# Patient Record
Sex: FEMALE | Race: WHITE | NOT HISPANIC OR LATINO | Employment: OTHER | URBAN - METROPOLITAN AREA
[De-identification: names, ages, dates, MRNs, and addresses within clinical notes are randomized per-mention and may not be internally consistent; named-entity substitution may affect disease eponyms.]

---

## 2017-01-23 ENCOUNTER — HOSPITAL ENCOUNTER (OUTPATIENT)
Dept: GENERAL RADIOLOGY | Facility: HOSPITAL | Age: 77
Discharge: HOME | End: 2017-01-23
Attending: FAMILY MEDICINE | Admitting: FAMILY MEDICINE

## 2017-01-23 ENCOUNTER — TRANSCRIBE ORDERS (OUTPATIENT)
Dept: ADMINISTRATIVE | Facility: HOSPITAL | Age: 77
End: 2017-01-23

## 2017-01-23 DIAGNOSIS — R09.02 HYPOXIA: ICD-10-CM

## 2017-01-23 DIAGNOSIS — R09.02 HYPOXEMIA: Primary | ICD-10-CM

## 2017-01-23 PROCEDURE — 71020 HC CHEST PA AND LATERAL: CPT

## 2017-09-27 ENCOUNTER — TRANSCRIBE ORDERS (OUTPATIENT)
Dept: ADMINISTRATIVE | Facility: HOSPITAL | Age: 77
End: 2017-09-27

## 2017-10-26 ENCOUNTER — TRANSCRIBE ORDERS (OUTPATIENT)
Dept: ADMINISTRATIVE | Facility: HOSPITAL | Age: 77
End: 2017-10-26

## 2017-10-26 DIAGNOSIS — Z12.31 VISIT FOR SCREENING MAMMOGRAM: Primary | ICD-10-CM

## 2017-11-15 ENCOUNTER — HOSPITAL ENCOUNTER (OUTPATIENT)
Dept: MAMMOGRAPHY | Facility: HOSPITAL | Age: 77
Discharge: HOME | End: 2017-11-15
Attending: FAMILY MEDICINE | Admitting: FAMILY MEDICINE

## 2017-11-15 DIAGNOSIS — Z12.31 VISIT FOR SCREENING MAMMOGRAM: ICD-10-CM

## 2017-11-15 PROCEDURE — G0202 SCR MAMMO BI INCL CAD: HCPCS

## 2017-11-15 PROCEDURE — 77063 BREAST TOMOSYNTHESIS BI: CPT

## 2017-11-16 PROCEDURE — 77063 BREAST TOMOSYNTHESIS BI: CPT | Performed by: RADIOLOGY

## 2017-11-16 PROCEDURE — G0202 SCR MAMMO BI INCL CAD: HCPCS | Performed by: RADIOLOGY

## 2018-03-23 ENCOUNTER — LAB REQUISITION (OUTPATIENT)
Dept: LAB | Facility: HOSPITAL | Age: 78
End: 2018-03-23

## 2018-03-23 DIAGNOSIS — R13.10 DYSPHAGIA: ICD-10-CM

## 2018-03-23 PROCEDURE — 88305 TISSUE EXAM BY PATHOLOGIST: CPT | Performed by: INTERNAL MEDICINE

## 2018-03-26 LAB
CYTO UR: NORMAL
LAB AP CASE REPORT: NORMAL
LAB AP CLINICAL INFORMATION: NORMAL
Lab: NORMAL
PATH REPORT.FINAL DX SPEC: NORMAL
PATH REPORT.GROSS SPEC: NORMAL

## 2018-12-03 ENCOUNTER — TRANSCRIBE ORDERS (OUTPATIENT)
Dept: ADMINISTRATIVE | Facility: HOSPITAL | Age: 78
End: 2018-12-03

## 2018-12-03 DIAGNOSIS — Z12.31 VISIT FOR SCREENING MAMMOGRAM: Primary | ICD-10-CM

## 2019-01-17 ENCOUNTER — HOSPITAL ENCOUNTER (OUTPATIENT)
Dept: MAMMOGRAPHY | Facility: HOSPITAL | Age: 79
Discharge: HOME | End: 2019-01-17
Attending: FAMILY MEDICINE | Admitting: FAMILY MEDICINE

## 2019-01-17 DIAGNOSIS — Z12.31 VISIT FOR SCREENING MAMMOGRAM: ICD-10-CM

## 2019-01-17 PROCEDURE — 77063 BREAST TOMOSYNTHESIS BI: CPT

## 2019-01-17 PROCEDURE — 77067 SCR MAMMO BI INCL CAD: CPT | Performed by: RADIOLOGY

## 2019-01-17 PROCEDURE — 77067 SCR MAMMO BI INCL CAD: CPT

## 2019-01-17 PROCEDURE — 77063 BREAST TOMOSYNTHESIS BI: CPT | Performed by: RADIOLOGY

## 2019-07-01 ENCOUNTER — OUTSIDE FACILITY SERVICE (OUTPATIENT)
Dept: GASTROENTEROLOGY | Facility: CLINIC | Age: 79
End: 2019-07-01

## 2019-07-01 PROCEDURE — 43239 EGD BIOPSY SINGLE/MULTIPLE: CPT | Performed by: INTERNAL MEDICINE

## 2019-07-17 ENCOUNTER — LAB REQUISITION (OUTPATIENT)
Dept: LAB | Facility: HOSPITAL | Age: 79
End: 2019-07-17

## 2019-07-17 DIAGNOSIS — Z00.00 ROUTINE GENERAL MEDICAL EXAMINATION AT A HEALTH CARE FACILITY: ICD-10-CM

## 2019-07-17 PROCEDURE — 36415 COLL VENOUS BLD VENIPUNCTURE: CPT

## 2020-02-20 ENCOUNTER — TRANSCRIBE ORDERS (OUTPATIENT)
Dept: ADMINISTRATIVE | Facility: HOSPITAL | Age: 80
End: 2020-02-20

## 2020-02-20 DIAGNOSIS — Z12.31 VISIT FOR SCREENING MAMMOGRAM: Primary | ICD-10-CM

## 2020-02-25 ENCOUNTER — HOSPITAL ENCOUNTER (OUTPATIENT)
Dept: MAMMOGRAPHY | Facility: HOSPITAL | Age: 80
Discharge: HOME | End: 2020-02-25
Admitting: FAMILY MEDICINE

## 2020-02-25 DIAGNOSIS — Z12.31 VISIT FOR SCREENING MAMMOGRAM: ICD-10-CM

## 2020-02-25 PROCEDURE — 77063 BREAST TOMOSYNTHESIS BI: CPT

## 2020-02-25 PROCEDURE — 77063 BREAST TOMOSYNTHESIS BI: CPT | Performed by: RADIOLOGY

## 2020-02-25 PROCEDURE — 77067 SCR MAMMO BI INCL CAD: CPT | Performed by: RADIOLOGY

## 2020-02-25 PROCEDURE — 77067 SCR MAMMO BI INCL CAD: CPT

## 2021-03-31 ENCOUNTER — TRANSCRIBE ORDERS (OUTPATIENT)
Dept: ADMINISTRATIVE | Facility: HOSPITAL | Age: 81
End: 2021-03-31

## 2021-03-31 DIAGNOSIS — Z12.31 VISIT FOR SCREENING MAMMOGRAM: Primary | ICD-10-CM

## 2021-05-19 ENCOUNTER — HOSPITAL ENCOUNTER (OUTPATIENT)
Dept: MAMMOGRAPHY | Facility: HOSPITAL | Age: 81
Discharge: HOME | End: 2021-05-19
Admitting: FAMILY MEDICINE

## 2021-05-19 DIAGNOSIS — Z12.31 VISIT FOR SCREENING MAMMOGRAM: ICD-10-CM

## 2021-05-19 PROCEDURE — 77063 BREAST TOMOSYNTHESIS BI: CPT

## 2021-05-19 PROCEDURE — 77067 SCR MAMMO BI INCL CAD: CPT

## 2021-05-24 PROCEDURE — 77063 BREAST TOMOSYNTHESIS BI: CPT | Performed by: RADIOLOGY

## 2021-05-24 PROCEDURE — 77067 SCR MAMMO BI INCL CAD: CPT | Performed by: RADIOLOGY

## 2021-11-29 ENCOUNTER — TELEPHONE (OUTPATIENT)
Dept: GASTROENTEROLOGY | Facility: CLINIC | Age: 81
End: 2021-11-29

## 2021-11-29 NOTE — TELEPHONE ENCOUNTER
PATIENT CALLED IN BECAUSE WE HAD SENT HER A LETTER INFORMING HER IT WAS TIME TO SCHEDULE A COLONOSCOPY, SHE INFORMED ME THAT SHE WAS SENT TO ANOTHER DOCTOR DUE TO HER HEMORRHOIDS AND THEY WENT AHEAD AND DID A COLONOSCOPY THEN.   SHE WILL NOT BE DUE UNTIL NEXT YEAR, AND SHE WILL CALL US BACK THEN.

## 2022-04-06 ENCOUNTER — TRANSCRIBE ORDERS (OUTPATIENT)
Dept: ADMINISTRATIVE | Facility: HOSPITAL | Age: 82
End: 2022-04-06

## 2022-04-06 DIAGNOSIS — Z12.31 VISIT FOR SCREENING MAMMOGRAM: Primary | ICD-10-CM

## 2022-07-05 ENCOUNTER — TRANSCRIBE ORDERS (OUTPATIENT)
Dept: ADMINISTRATIVE | Facility: HOSPITAL | Age: 82
End: 2022-07-05

## 2022-07-05 DIAGNOSIS — I73.9 PERIPHERAL VASCULAR DISEASE: Primary | ICD-10-CM

## 2022-07-16 ENCOUNTER — APPOINTMENT (OUTPATIENT)
Dept: MAMMOGRAPHY | Facility: HOSPITAL | Age: 82
End: 2022-07-16

## 2023-02-23 ENCOUNTER — HOSPITAL ENCOUNTER (EMERGENCY)
Facility: HOSPITAL | Age: 83
Discharge: HOME | End: 2023-02-23
Attending: EMERGENCY MEDICINE | Admitting: EMERGENCY MEDICINE
Payer: MEDICARE

## 2023-02-23 ENCOUNTER — APPOINTMENT (OUTPATIENT)
Dept: GENERAL RADIOLOGY | Facility: HOSPITAL | Age: 83
End: 2023-02-23
Payer: MEDICARE

## 2023-02-23 VITALS
HEART RATE: 73 BPM | BODY MASS INDEX: 17.49 KG/M2 | WEIGHT: 105 LBS | TEMPERATURE: 97.9 F | HEIGHT: 65 IN | SYSTOLIC BLOOD PRESSURE: 131 MMHG | RESPIRATION RATE: 24 BRPM | DIASTOLIC BLOOD PRESSURE: 76 MMHG | OXYGEN SATURATION: 100 %

## 2023-02-23 DIAGNOSIS — R07.89 LEFT-SIDED CHEST WALL PAIN: Primary | ICD-10-CM

## 2023-02-23 LAB
ALBUMIN SERPL-MCNC: 4.1 G/DL (ref 3.5–5.2)
ALBUMIN/GLOB SERPL: 1.5 G/DL
ALP SERPL-CCNC: 48 U/L (ref 39–117)
ALT SERPL W P-5'-P-CCNC: 16 U/L (ref 1–33)
ANION GAP SERPL CALCULATED.3IONS-SCNC: 10 MMOL/L (ref 5–15)
AST SERPL-CCNC: 25 U/L (ref 1–32)
BASOPHILS # BLD AUTO: 0.04 10*3/MM3 (ref 0–0.2)
BASOPHILS NFR BLD AUTO: 0.5 % (ref 0–1.5)
BILIRUB SERPL-MCNC: 0.5 MG/DL (ref 0–1.2)
BUN SERPL-MCNC: 14 MG/DL (ref 8–23)
BUN/CREAT SERPL: 12.2 (ref 7–25)
CALCIUM SPEC-SCNC: 9.4 MG/DL (ref 8.6–10.5)
CHLORIDE SERPL-SCNC: 108 MMOL/L (ref 98–107)
CO2 SERPL-SCNC: 22 MMOL/L (ref 22–29)
CREAT SERPL-MCNC: 1.15 MG/DL (ref 0.57–1)
DEPRECATED RDW RBC AUTO: 41.1 FL (ref 37–54)
EGFRCR SERPLBLD CKD-EPI 2021: 47.7 ML/MIN/1.73
EOSINOPHIL # BLD AUTO: 0.13 10*3/MM3 (ref 0–0.4)
EOSINOPHIL NFR BLD AUTO: 1.6 % (ref 0.3–6.2)
ERYTHROCYTE [DISTWIDTH] IN BLOOD BY AUTOMATED COUNT: 13.8 % (ref 12.3–15.4)
GLOBULIN UR ELPH-MCNC: 2.8 GM/DL
GLUCOSE SERPL-MCNC: 114 MG/DL (ref 65–99)
HCT VFR BLD AUTO: 44.1 % (ref 34–46.6)
HGB BLD-MCNC: 14.1 G/DL (ref 12–15.9)
HOLD SPECIMEN: NORMAL
IMM GRANULOCYTES # BLD AUTO: 0.02 10*3/MM3 (ref 0–0.05)
IMM GRANULOCYTES NFR BLD AUTO: 0.2 % (ref 0–0.5)
LYMPHOCYTES # BLD AUTO: 1.15 10*3/MM3 (ref 0.7–3.1)
LYMPHOCYTES NFR BLD AUTO: 13.9 % (ref 19.6–45.3)
MCH RBC QN AUTO: 26.3 PG (ref 26.6–33)
MCHC RBC AUTO-ENTMCNC: 32 G/DL (ref 31.5–35.7)
MCV RBC AUTO: 82.3 FL (ref 79–97)
MONOCYTES # BLD AUTO: 0.51 10*3/MM3 (ref 0.1–0.9)
MONOCYTES NFR BLD AUTO: 6.2 % (ref 5–12)
NEUTROPHILS NFR BLD AUTO: 6.41 10*3/MM3 (ref 1.7–7)
NEUTROPHILS NFR BLD AUTO: 77.6 % (ref 42.7–76)
NRBC BLD AUTO-RTO: 0 /100 WBC (ref 0–0.2)
NT-PROBNP SERPL-MCNC: 31.7 PG/ML (ref 0–1800)
PLATELET # BLD AUTO: 386 10*3/MM3 (ref 140–450)
PMV BLD AUTO: 9.2 FL (ref 6–12)
POTASSIUM SERPL-SCNC: 3.8 MMOL/L (ref 3.5–5.2)
PROT SERPL-MCNC: 6.9 G/DL (ref 6–8.5)
RBC # BLD AUTO: 5.36 10*6/MM3 (ref 3.77–5.28)
SODIUM SERPL-SCNC: 140 MMOL/L (ref 136–145)
TROPONIN T SERPL HS-MCNC: 7 NG/L
WBC NRBC COR # BLD: 8.26 10*3/MM3 (ref 3.4–10.8)
WHOLE BLOOD HOLD COAG: NORMAL
WHOLE BLOOD HOLD SPECIMEN: NORMAL

## 2023-02-23 PROCEDURE — 99283 EMERGENCY DEPT VISIT LOW MDM: CPT

## 2023-02-23 PROCEDURE — 93005 ELECTROCARDIOGRAM TRACING: CPT | Performed by: EMERGENCY MEDICINE

## 2023-02-23 PROCEDURE — 80053 COMPREHEN METABOLIC PANEL: CPT | Performed by: EMERGENCY MEDICINE

## 2023-02-23 PROCEDURE — 85025 COMPLETE CBC W/AUTO DIFF WBC: CPT | Performed by: EMERGENCY MEDICINE

## 2023-02-23 PROCEDURE — 84484 ASSAY OF TROPONIN QUANT: CPT | Performed by: EMERGENCY MEDICINE

## 2023-02-23 PROCEDURE — 83880 ASSAY OF NATRIURETIC PEPTIDE: CPT | Performed by: EMERGENCY MEDICINE

## 2023-02-23 PROCEDURE — 36415 COLL VENOUS BLD VENIPUNCTURE: CPT

## 2023-02-23 PROCEDURE — 71045 X-RAY EXAM CHEST 1 VIEW: CPT

## 2023-02-23 RX ORDER — SODIUM CHLORIDE 0.9 % (FLUSH) 0.9 %
10 SYRINGE (ML) INJECTION AS NEEDED
Status: DISCONTINUED | OUTPATIENT
Start: 2023-02-23 | End: 2023-02-23 | Stop reason: HOSPADM

## 2023-02-28 LAB
QT INTERVAL: 402 MS
QTC INTERVAL: 434 MS

## 2023-09-05 ENCOUNTER — HOSPITAL ENCOUNTER (OUTPATIENT)
Dept: ULTRASOUND IMAGING | Facility: HOSPITAL | Age: 83
Discharge: HOME | End: 2023-09-05
Payer: MEDICARE

## 2023-09-05 ENCOUNTER — HOSPITAL ENCOUNTER (OUTPATIENT)
Dept: MAMMOGRAPHY | Facility: HOSPITAL | Age: 83
Discharge: HOME | End: 2023-09-05
Payer: MEDICARE

## 2023-09-05 DIAGNOSIS — N64.4 BREAST PAIN, LEFT: ICD-10-CM

## 2023-09-05 PROCEDURE — G0279 TOMOSYNTHESIS, MAMMO: HCPCS

## 2023-09-05 PROCEDURE — 77066 DX MAMMO INCL CAD BI: CPT

## 2023-09-05 PROCEDURE — G0279 TOMOSYNTHESIS, MAMMO: HCPCS | Performed by: RADIOLOGY

## 2023-09-05 PROCEDURE — 76642 ULTRASOUND BREAST LIMITED: CPT

## 2023-09-05 PROCEDURE — 77066 DX MAMMO INCL CAD BI: CPT | Performed by: RADIOLOGY

## 2023-09-05 PROCEDURE — 76642 ULTRASOUND BREAST LIMITED: CPT | Performed by: RADIOLOGY

## 2024-03-11 ENCOUNTER — HOSPITAL ENCOUNTER (OUTPATIENT)
Dept: GENERAL RADIOLOGY | Facility: HOSPITAL | Age: 84
Discharge: HOME | End: 2024-03-11
Admitting: PHYSICIAN ASSISTANT
Payer: MEDICARE

## 2024-03-11 ENCOUNTER — TRANSCRIBE ORDERS (OUTPATIENT)
Dept: GENERAL RADIOLOGY | Facility: HOSPITAL | Age: 84
End: 2024-03-11
Payer: MEDICARE

## 2024-03-11 DIAGNOSIS — S49.91XA INJURY OF UPPER ARM, RIGHT, INITIAL ENCOUNTER: Primary | ICD-10-CM

## 2024-03-11 DIAGNOSIS — S49.91XA INJURY OF UPPER ARM, RIGHT, INITIAL ENCOUNTER: ICD-10-CM

## 2024-03-11 PROCEDURE — 73090 X-RAY EXAM OF FOREARM: CPT

## 2024-03-11 PROCEDURE — 73060 X-RAY EXAM OF HUMERUS: CPT

## 2024-08-12 ENCOUNTER — TRANSCRIBE ORDERS (OUTPATIENT)
Dept: MRI IMAGING | Facility: HOSPITAL | Age: 84
End: 2024-08-12
Payer: MEDICARE

## 2024-08-12 ENCOUNTER — HOSPITAL ENCOUNTER (OUTPATIENT)
Dept: GENERAL RADIOLOGY | Facility: HOSPITAL | Age: 84
Discharge: HOME | End: 2024-08-12
Admitting: FAMILY MEDICINE
Payer: MEDICARE

## 2024-08-12 DIAGNOSIS — M16.12 PRIMARY OSTEOARTHRITIS OF LEFT HIP: Primary | ICD-10-CM

## 2024-08-12 DIAGNOSIS — M16.12 PRIMARY OSTEOARTHRITIS OF LEFT HIP: ICD-10-CM

## 2024-08-12 PROCEDURE — 73502 X-RAY EXAM HIP UNI 2-3 VIEWS: CPT

## 2024-09-28 ENCOUNTER — APPOINTMENT (OUTPATIENT)
Dept: CT IMAGING | Facility: HOSPITAL | Age: 84
End: 2024-09-28
Payer: MEDICARE

## 2024-09-28 ENCOUNTER — APPOINTMENT (OUTPATIENT)
Dept: GENERAL RADIOLOGY | Facility: HOSPITAL | Age: 84
End: 2024-09-28
Payer: MEDICARE

## 2024-09-28 ENCOUNTER — HOSPITAL ENCOUNTER (EMERGENCY)
Facility: HOSPITAL | Age: 84
Discharge: HOME | End: 2024-09-28
Attending: EMERGENCY MEDICINE
Payer: MEDICARE

## 2024-09-28 VITALS
HEIGHT: 66 IN | RESPIRATION RATE: 16 BRPM | SYSTOLIC BLOOD PRESSURE: 124 MMHG | OXYGEN SATURATION: 95 % | BODY MASS INDEX: 16.39 KG/M2 | HEART RATE: 84 BPM | WEIGHT: 102 LBS | TEMPERATURE: 98.2 F | DIASTOLIC BLOOD PRESSURE: 74 MMHG

## 2024-09-28 DIAGNOSIS — S62.606A CLOSED NONDISPLACED FRACTURE OF PHALANX OF RIGHT LITTLE FINGER, UNSPECIFIED PHALANX, INITIAL ENCOUNTER: ICD-10-CM

## 2024-09-28 DIAGNOSIS — Z86.59 HISTORY OF DEMENTIA: ICD-10-CM

## 2024-09-28 DIAGNOSIS — N39.0 ACUTE UTI: Primary | ICD-10-CM

## 2024-09-28 LAB
ALBUMIN SERPL-MCNC: 4.1 G/DL (ref 3.5–5.2)
ALBUMIN/GLOB SERPL: 1.8 G/DL
ALP SERPL-CCNC: 79 U/L (ref 39–117)
ALT SERPL W P-5'-P-CCNC: 16 U/L (ref 1–33)
AMMONIA BLD-SCNC: 18 UMOL/L (ref 11–51)
AMPHET+METHAMPHET UR QL: NEGATIVE
AMPHETAMINES UR QL: NEGATIVE
ANION GAP SERPL CALCULATED.3IONS-SCNC: 16 MMOL/L (ref 5–15)
APAP SERPL-MCNC: <5 MCG/ML (ref 0–30)
AST SERPL-CCNC: 26 U/L (ref 1–32)
BACTERIA UR QL AUTO: ABNORMAL /HPF
BARBITURATES UR QL SCN: NEGATIVE
BASOPHILS # BLD AUTO: 0.05 10*3/MM3 (ref 0–0.2)
BASOPHILS NFR BLD AUTO: 0.6 % (ref 0–1.5)
BENZODIAZ UR QL SCN: NEGATIVE
BILIRUB SERPL-MCNC: 1 MG/DL (ref 0–1.2)
BILIRUB UR QL STRIP: NEGATIVE
BUN SERPL-MCNC: 21 MG/DL (ref 8–23)
BUN/CREAT SERPL: 18.4 (ref 7–25)
BUPRENORPHINE SERPL-MCNC: NEGATIVE NG/ML
CALCIUM SPEC-SCNC: 9.3 MG/DL (ref 8.6–10.5)
CANNABINOIDS SERPL QL: NEGATIVE
CHLORIDE SERPL-SCNC: 105 MMOL/L (ref 98–107)
CLARITY UR: ABNORMAL
CO2 SERPL-SCNC: 19 MMOL/L (ref 22–29)
COCAINE UR QL: NEGATIVE
COLOR UR: ABNORMAL
CREAT SERPL-MCNC: 1.14 MG/DL (ref 0.57–1)
D DIMER PPP FEU-MCNC: 0.55 MCGFEU/ML (ref 0–0.84)
DEPRECATED RDW RBC AUTO: 44.6 FL (ref 37–54)
EGFRCR SERPLBLD CKD-EPI 2021: 47.6 ML/MIN/1.73
EOSINOPHIL # BLD AUTO: 0.11 10*3/MM3 (ref 0–0.4)
EOSINOPHIL NFR BLD AUTO: 1.2 % (ref 0.3–6.2)
ERYTHROCYTE [DISTWIDTH] IN BLOOD BY AUTOMATED COUNT: 14.1 % (ref 12.3–15.4)
ETHANOL BLD-MCNC: <10 MG/DL (ref 0–10)
FENTANYL UR-MCNC: NEGATIVE NG/ML
GLOBULIN UR ELPH-MCNC: 2.3 GM/DL
GLUCOSE SERPL-MCNC: 87 MG/DL (ref 65–99)
GLUCOSE UR STRIP-MCNC: NEGATIVE MG/DL
HCT VFR BLD AUTO: 42.2 % (ref 34–46.6)
HGB BLD-MCNC: 13.6 G/DL (ref 12–15.9)
HGB UR QL STRIP.AUTO: ABNORMAL
HOLD SPECIMEN: NORMAL
HYALINE CASTS UR QL AUTO: ABNORMAL /LPF
IMM GRANULOCYTES # BLD AUTO: 0.04 10*3/MM3 (ref 0–0.05)
IMM GRANULOCYTES NFR BLD AUTO: 0.4 % (ref 0–0.5)
KETONES UR QL STRIP: ABNORMAL
LEUKOCYTE ESTERASE UR QL STRIP.AUTO: ABNORMAL
LYMPHOCYTES # BLD AUTO: 1.64 10*3/MM3 (ref 0.7–3.1)
LYMPHOCYTES NFR BLD AUTO: 18.1 % (ref 19.6–45.3)
MAGNESIUM SERPL-MCNC: 2.1 MG/DL (ref 1.6–2.4)
MCH RBC QN AUTO: 27.7 PG (ref 26.6–33)
MCHC RBC AUTO-ENTMCNC: 32.2 G/DL (ref 31.5–35.7)
MCV RBC AUTO: 85.9 FL (ref 79–97)
METHADONE UR QL SCN: NEGATIVE
MONOCYTES # BLD AUTO: 0.85 10*3/MM3 (ref 0.1–0.9)
MONOCYTES NFR BLD AUTO: 9.4 % (ref 5–12)
NEUTROPHILS NFR BLD AUTO: 6.37 10*3/MM3 (ref 1.7–7)
NEUTROPHILS NFR BLD AUTO: 70.3 % (ref 42.7–76)
NITRITE UR QL STRIP: NEGATIVE
NRBC BLD AUTO-RTO: 0 /100 WBC (ref 0–0.2)
NT-PROBNP SERPL-MCNC: 138.4 PG/ML (ref 0–1800)
OPIATES UR QL: NEGATIVE
OXYCODONE UR QL SCN: NEGATIVE
PCP UR QL SCN: NEGATIVE
PH UR STRIP.AUTO: <=5 [PH] (ref 5–8)
PLATELET # BLD AUTO: 294 10*3/MM3 (ref 140–450)
PMV BLD AUTO: 9.3 FL (ref 6–12)
POTASSIUM SERPL-SCNC: 3.7 MMOL/L (ref 3.5–5.2)
PROT SERPL-MCNC: 6.4 G/DL (ref 6–8.5)
PROT UR QL STRIP: ABNORMAL
QT INTERVAL: 404 MS
QTC INTERVAL: 465 MS
RBC # BLD AUTO: 4.91 10*6/MM3 (ref 3.77–5.28)
RBC # UR STRIP: ABNORMAL /HPF
REF LAB TEST METHOD: ABNORMAL
SALICYLATES SERPL-MCNC: <0.3 MG/DL
SODIUM SERPL-SCNC: 140 MMOL/L (ref 136–145)
SP GR UR STRIP: 1.03 (ref 1–1.03)
SQUAMOUS #/AREA URNS HPF: ABNORMAL /HPF
T4 FREE SERPL-MCNC: 1.13 NG/DL (ref 0.92–1.68)
TRICYCLICS UR QL SCN: NEGATIVE
TROPONIN T SERPL HS-MCNC: 23 NG/L
TSH SERPL DL<=0.05 MIU/L-ACNC: 2.12 UIU/ML (ref 0.27–4.2)
UROBILINOGEN UR QL STRIP: ABNORMAL
WBC # UR STRIP: ABNORMAL /HPF
WBC NRBC COR # BLD AUTO: 9.06 10*3/MM3 (ref 3.4–10.8)
WHOLE BLOOD HOLD COAG: NORMAL
WHOLE BLOOD HOLD SPECIMEN: NORMAL

## 2024-09-28 PROCEDURE — 99284 EMERGENCY DEPT VISIT MOD MDM: CPT

## 2024-09-28 PROCEDURE — 84484 ASSAY OF TROPONIN QUANT: CPT | Performed by: EMERGENCY MEDICINE

## 2024-09-28 PROCEDURE — 85379 FIBRIN DEGRADATION QUANT: CPT | Performed by: EMERGENCY MEDICINE

## 2024-09-28 PROCEDURE — 85025 COMPLETE CBC W/AUTO DIFF WBC: CPT | Performed by: EMERGENCY MEDICINE

## 2024-09-28 PROCEDURE — 70450 CT HEAD/BRAIN W/O DYE: CPT

## 2024-09-28 PROCEDURE — 83735 ASSAY OF MAGNESIUM: CPT | Performed by: EMERGENCY MEDICINE

## 2024-09-28 PROCEDURE — 87086 URINE CULTURE/COLONY COUNT: CPT | Performed by: EMERGENCY MEDICINE

## 2024-09-28 PROCEDURE — 82140 ASSAY OF AMMONIA: CPT | Performed by: EMERGENCY MEDICINE

## 2024-09-28 PROCEDURE — 82077 ASSAY SPEC XCP UR&BREATH IA: CPT | Performed by: EMERGENCY MEDICINE

## 2024-09-28 PROCEDURE — 81001 URINALYSIS AUTO W/SCOPE: CPT | Performed by: EMERGENCY MEDICINE

## 2024-09-28 PROCEDURE — 80179 DRUG ASSAY SALICYLATE: CPT | Performed by: EMERGENCY MEDICINE

## 2024-09-28 PROCEDURE — 87088 URINE BACTERIA CULTURE: CPT | Performed by: EMERGENCY MEDICINE

## 2024-09-28 PROCEDURE — 80307 DRUG TEST PRSMV CHEM ANLYZR: CPT | Performed by: EMERGENCY MEDICINE

## 2024-09-28 PROCEDURE — 87186 SC STD MICRODIL/AGAR DIL: CPT | Performed by: EMERGENCY MEDICINE

## 2024-09-28 PROCEDURE — 73130 X-RAY EXAM OF HAND: CPT

## 2024-09-28 PROCEDURE — 83880 ASSAY OF NATRIURETIC PEPTIDE: CPT | Performed by: EMERGENCY MEDICINE

## 2024-09-28 PROCEDURE — 93005 ELECTROCARDIOGRAM TRACING: CPT | Performed by: EMERGENCY MEDICINE

## 2024-09-28 PROCEDURE — 71045 X-RAY EXAM CHEST 1 VIEW: CPT

## 2024-09-28 PROCEDURE — 80143 DRUG ASSAY ACETAMINOPHEN: CPT | Performed by: EMERGENCY MEDICINE

## 2024-09-28 PROCEDURE — 80053 COMPREHEN METABOLIC PANEL: CPT | Performed by: EMERGENCY MEDICINE

## 2024-09-28 PROCEDURE — 84439 ASSAY OF FREE THYROXINE: CPT | Performed by: EMERGENCY MEDICINE

## 2024-09-28 PROCEDURE — 84443 ASSAY THYROID STIM HORMONE: CPT | Performed by: EMERGENCY MEDICINE

## 2024-09-28 RX ORDER — SODIUM CHLORIDE 0.9 % (FLUSH) 0.9 %
10 SYRINGE (ML) INJECTION AS NEEDED
Status: DISCONTINUED | OUTPATIENT
Start: 2024-09-28 | End: 2024-09-29 | Stop reason: HOSPADM

## 2024-09-28 RX ORDER — CEFUROXIME AXETIL 250 MG/1
500 TABLET ORAL ONCE
Status: COMPLETED | OUTPATIENT
Start: 2024-09-28 | End: 2024-09-28

## 2024-09-28 RX ORDER — CEFUROXIME AXETIL 250 MG/1
500 TABLET ORAL 2 TIMES DAILY
Qty: 28 TABLET | Refills: 0 | Status: SHIPPED | OUTPATIENT
Start: 2024-09-28 | End: 2024-09-28

## 2024-09-28 RX ORDER — CEFUROXIME AXETIL 250 MG/1
500 TABLET ORAL 2 TIMES DAILY
Qty: 28 TABLET | Refills: 0 | Status: SHIPPED | OUTPATIENT
Start: 2024-09-28 | End: 2024-10-05

## 2024-09-28 RX ADMIN — CEFUROXIME AXETIL 500 MG: 250 TABLET, FILM COATED ORAL at 22:04

## 2024-09-29 NOTE — ED PROVIDER NOTES
"Subjective   History of Present Illness  84-year-old female presents for evaluation of \"multiple complaints.\"  Of note, the patient is accompanied by her daughter who aids and corroborates in her history.  The patient lives alone.  She has a history of early dementia.  The patient was reportedly at her baseline according to her daughter yesterday evening.  This morning, she reportedly was walking outside in the rain confused.  She is continue to be confused when compared to her baseline throughout the day today so her daughter brought her here to the ED to be evaluated.  The patient is complaining of shortness of breath with exertion.  She reports having a fall and is complaining of pain to her right pinky finger.  She denies any cough or congestion.  She denies any known sick contacts or any known exposures to anyone with COVID-19.  The patient's daughter tells me that she has had similar episodes of confusion before in the past over the past few years.  No known fevers.  No neck pain or stiffness.      Review of Systems   Respiratory:  Positive for shortness of breath.    Psychiatric/Behavioral:  Positive for confusion.    All other systems reviewed and are negative.      No past medical history on file.    No Known Allergies    Past Surgical History:   Procedure Laterality Date    BREAST CYST ASPIRATION Left 2002    HYSTERECTOMY  1990       Family History   Problem Relation Age of Onset    Ovarian cancer Neg Hx     Breast cancer Neg Hx        Social History     Socioeconomic History    Marital status:            Objective   Physical Exam  Vitals and nursing note reviewed.   Constitutional:       General: She is not in acute distress.     Appearance: She is well-developed. She is not diaphoretic.      Comments: Nontoxic-appearing female   HENT:      Head: Normocephalic and atraumatic.   Eyes:      Pupils: Pupils are equal, round, and reactive to light.   Neck:      Comments: No meningeal signs or nuchal " "rigidity  Cardiovascular:      Rate and Rhythm: Normal rate and regular rhythm.      Heart sounds: Normal heart sounds. No murmur heard.     No friction rub. No gallop.   Pulmonary:      Effort: Pulmonary effort is normal. No respiratory distress.      Breath sounds: Normal breath sounds. No wheezing or rales.   Abdominal:      General: Bowel sounds are normal. There is no distension.      Palpations: Abdomen is soft. There is no mass.      Tenderness: There is no abdominal tenderness. There is no guarding or rebound.      Comments: No focal abdominal tenderness, no peritoneal signs, no pain out of proportion to exam   Musculoskeletal:         General: Normal range of motion.      Cervical back: Neck supple.   Skin:     General: Skin is warm and dry.      Findings: No erythema or rash.   Neurological:      Mental Status: She is alert and oriented to person, place, and time.      Comments: Awake, alert, and oriented x3, clear and fluent speech, no ataxia or dysmetria, normal gait, neurovascularly intact distally in all fours with bounding distal pulses and normal sensation, 5 out of 5 strength in all fours, no cranial nerve deficits noted with cranial nerves II through XII grossly intact   Psychiatric:         Mood and Affect: Mood normal.         Thought Content: Thought content normal.         Judgment: Judgment normal.         Procedures           ED Course  ED Course as of 09/29/24 0058   Sat Sep 28, 2024   2005 84-year-old female presents for evaluation of \"multiple complaints.\"  Of note, she is accompanied by her daughter who corroborates her history.  The patient lives alone.  She has a history of early dementia.  Patient was reportedly at her baseline according to her daughter yesterday evening.  This morning, she reportedly was walking out in the rain confused.  She has continued to be confused when compared to her baseline throughout the day today.  She is complaining of shortness of breath with exertion.  " Given her symptoms, her daughter brought her here to the emergency department to be evaluated this evening.  On arrival, the patient is nontoxic-appearing.  She is answering questions appropriately.  Vital signs are reassuring.  Differential diagnosis is quite broad.  We will obtain labs and imaging, and we will reassess following initial interventions. [DD]   2018 I personally and independently viewed the patient's x-ray images myself, and I am in agreement with the radiologist's reading for final interpretation, particularly there is no pneumonia noted. [DD]   2035 The patient is complaining of pain to her right fifth digit from a fall earlier today.  She has mild circumferential soft tissue swelling noted to her right pinky finger. [DD]   2036 I personally and independently viewed the patient's x-ray images myself, and I am in agreement with the radiologist's reading for final interpretation, particularly regarding fracture to right pinky thinker.  Aluminum finger splint placed. [DD]   2104 Labs are bland/unrevealing. [DD]   2104 I personally and independently reviewed the patient's CT images and findings, and I am in agreement with the radiologist regarding CT interpretation--particularly there is no emergent/surgical intracranial process present. [DD]   2138 Urinalysis is suggestive of infection.  Ceftin given.  Urine culture obtained. [DD]   2139 High-sensitivity troponin is mildly elevated at 23 but clinical presentation is clearly not consistent with ACS.  Low risk Well's and D dimer is negative. [DD]      ED Course User Index  [DD] Carlo Rose MD                                 Recent Results (from the past 24 hour(s))   ECG 12 Lead ED Triage Standing Order; Weak / Dizzy / AMS    Collection Time: 09/28/24  7:40 PM   Result Value Ref Range    QT Interval 404 ms    QTC Interval 465 ms   Comprehensive Metabolic Panel    Collection Time: 09/28/24  8:23 PM    Specimen: Blood   Result Value Ref Range     Glucose 87 65 - 99 mg/dL    BUN 21 8 - 23 mg/dL    Creatinine 1.14 (H) 0.57 - 1.00 mg/dL    Sodium 140 136 - 145 mmol/L    Potassium 3.7 3.5 - 5.2 mmol/L    Chloride 105 98 - 107 mmol/L    CO2 19.0 (L) 22.0 - 29.0 mmol/L    Calcium 9.3 8.6 - 10.5 mg/dL    Total Protein 6.4 6.0 - 8.5 g/dL    Albumin 4.1 3.5 - 5.2 g/dL    ALT (SGPT) 16 1 - 33 U/L    AST (SGOT) 26 1 - 32 U/L    Alkaline Phosphatase 79 39 - 117 U/L    Total Bilirubin 1.0 0.0 - 1.2 mg/dL    Globulin 2.3 gm/dL    A/G Ratio 1.8 g/dL    BUN/Creatinine Ratio 18.4 7.0 - 25.0    Anion Gap 16.0 (H) 5.0 - 15.0 mmol/L    eGFR 47.6 (L) >60.0 mL/min/1.73   Single High Sensitivity Troponin T    Collection Time: 09/28/24  8:23 PM    Specimen: Blood   Result Value Ref Range    HS Troponin T 23 (H) <14 ng/L   Magnesium    Collection Time: 09/28/24  8:23 PM    Specimen: Blood   Result Value Ref Range    Magnesium 2.1 1.6 - 2.4 mg/dL   Green Top (Gel)    Collection Time: 09/28/24  8:23 PM   Result Value Ref Range    Extra Tube Hold for add-ons.    Lavender Top    Collection Time: 09/28/24  8:23 PM   Result Value Ref Range    Extra Tube hold for add-on    Gold Top - SST    Collection Time: 09/28/24  8:23 PM   Result Value Ref Range    Extra Tube Hold for add-ons.    Gray Top    Collection Time: 09/28/24  8:23 PM   Result Value Ref Range    Extra Tube Hold for add-ons.    Light Blue Top    Collection Time: 09/28/24  8:23 PM   Result Value Ref Range    Extra Tube Hold for add-ons.    CBC Auto Differential    Collection Time: 09/28/24  8:23 PM    Specimen: Blood   Result Value Ref Range    WBC 9.06 3.40 - 10.80 10*3/mm3    RBC 4.91 3.77 - 5.28 10*6/mm3    Hemoglobin 13.6 12.0 - 15.9 g/dL    Hematocrit 42.2 34.0 - 46.6 %    MCV 85.9 79.0 - 97.0 fL    MCH 27.7 26.6 - 33.0 pg    MCHC 32.2 31.5 - 35.7 g/dL    RDW 14.1 12.3 - 15.4 %    RDW-SD 44.6 37.0 - 54.0 fl    MPV 9.3 6.0 - 12.0 fL    Platelets 294 140 - 450 10*3/mm3    Neutrophil % 70.3 42.7 - 76.0 %    Lymphocyte % 18.1  (L) 19.6 - 45.3 %    Monocyte % 9.4 5.0 - 12.0 %    Eosinophil % 1.2 0.3 - 6.2 %    Basophil % 0.6 0.0 - 1.5 %    Immature Grans % 0.4 0.0 - 0.5 %    Neutrophils, Absolute 6.37 1.70 - 7.00 10*3/mm3    Lymphocytes, Absolute 1.64 0.70 - 3.10 10*3/mm3    Monocytes, Absolute 0.85 0.10 - 0.90 10*3/mm3    Eosinophils, Absolute 0.11 0.00 - 0.40 10*3/mm3    Basophils, Absolute 0.05 0.00 - 0.20 10*3/mm3    Immature Grans, Absolute 0.04 0.00 - 0.05 10*3/mm3    nRBC 0.0 0.0 - 0.2 /100 WBC   T4, Free    Collection Time: 09/28/24  8:23 PM    Specimen: Blood   Result Value Ref Range    Free T4 1.13 0.92 - 1.68 ng/dL   TSH    Collection Time: 09/28/24  8:23 PM    Specimen: Blood   Result Value Ref Range    TSH 2.120 0.270 - 4.200 uIU/mL   Ammonia    Collection Time: 09/28/24  8:23 PM    Specimen: Blood   Result Value Ref Range    Ammonia 18 11 - 51 umol/L   Acetaminophen Level    Collection Time: 09/28/24  8:23 PM    Specimen: Blood   Result Value Ref Range    Acetaminophen <5.0 0.0 - 30.0 mcg/mL   Ethanol    Collection Time: 09/28/24  8:23 PM    Specimen: Blood   Result Value Ref Range    Ethanol <10 0 - 10 mg/dL   Salicylate Level    Collection Time: 09/28/24  8:23 PM    Specimen: Blood   Result Value Ref Range    Salicylate <0.3 <=30.0 mg/dL   D-dimer, Quantitative    Collection Time: 09/28/24  8:23 PM    Specimen: Blood   Result Value Ref Range    D-Dimer, Quantitative 0.55 0.00 - 0.84 MCGFEU/mL   BNP    Collection Time: 09/28/24  8:23 PM    Specimen: Blood   Result Value Ref Range    proBNP 138.4 0.0 - 1,800.0 pg/mL   Urinalysis With Microscopic If Indicated (No Culture) - Urine, Clean Catch    Collection Time: 09/28/24  8:26 PM    Specimen: Urine, Clean Catch   Result Value Ref Range    Color, UA Dark Yellow (A) Yellow, Straw    Appearance, UA Turbid (A) Clear    pH, UA <=5.0 5.0 - 8.0    Specific Gravity, UA 1.029 1.001 - 1.030    Glucose, UA Negative Negative    Ketones, UA 15 mg/dL (1+) (A) Negative    Bilirubin, UA  Negative Negative    Blood, UA Trace (A) Negative    Protein, UA 30 mg/dL (1+) (A) Negative    Leuk Esterase, UA Moderate (2+) (A) Negative    Nitrite, UA Negative Negative    Urobilinogen, UA 1.0 E.U./dL 0.2 - 1.0 E.U./dL   Urine Drug Screen - Urine, Clean Catch    Collection Time: 09/28/24  8:26 PM    Specimen: Urine, Clean Catch   Result Value Ref Range    THC, Screen, Urine Negative Negative    Phencyclidine (PCP), Urine Negative Negative    Cocaine Screen, Urine Negative Negative    Methamphetamine, Ur Negative Negative    Opiate Screen Negative Negative    Amphetamine Screen, Urine Negative Negative    Benzodiazepine Screen, Urine Negative Negative    Tricyclic Antidepressants Screen Negative Negative    Methadone Screen, Urine Negative Negative    Barbiturates Screen, Urine Negative Negative    Oxycodone Screen, Urine Negative Negative    Buprenorphine, Screen, Urine Negative Negative   Fentanyl, Urine - Urine, Clean Catch    Collection Time: 09/28/24  8:26 PM    Specimen: Urine, Clean Catch   Result Value Ref Range    Fentanyl, Urine Negative Negative   Urinalysis, Microscopic Only - Urine, Clean Catch    Collection Time: 09/28/24  8:26 PM    Specimen: Urine, Clean Catch   Result Value Ref Range    RBC, UA 3-5 (A) None Seen, 0-2 /HPF    WBC, UA 6-10 (A) None Seen, 0-2 /HPF    Bacteria, UA 4+ (A) None Seen, Trace /HPF    Squamous Epithelial Cells, UA 3-6 (A) None Seen, 0-2 /HPF    Hyaline Casts, UA 7-12 0 - 6 /LPF    Methodology Manual Light Microscopy      Note: In addition to lab results from this visit, the labs listed above may include labs taken at another facility or during a different encounter within the last 24 hours. Please correlate lab times with ED admission and discharge times for further clarification of the services performed during this visit.    CT Head Without Contrast   Final Result   Impression:   1.No acute intracranial abnormality.   2.Mild chronic small vessel ischemic change.   3.Mild  generalized parenchymal volume loss congruent with age.            Electronically Signed: Jose Manuel Cutler MD     9/28/2024 8:45 PM EDT     Workstation ID: [id number]      XR Chest 1 View   Final Result   Impression:   1.No acute process identified.         Electronically Signed: Hayden Iniguez MD     9/28/2024 7:55 PM EDT     Workstation ID: [id number]      XR Hand 3+ View Right   Final Result   Impression:   1.Questionable nondisplaced avulsion fracture at the dorsal base of the fifth digit distal phalanx. Artifact from nutrient channel in the bone is also possible. Correlate for point tenderness at the dorsal DIP joint.   2.Osteopenia and osteoarthritis.            Electronically Signed: Jose Manuel Cutler MD     9/28/2024 8:20 PM EDT     Workstation ID: [id number]        Vitals:    09/28/24 1930 09/28/24 2015 09/28/24 2100 09/28/24 2130   BP: 131/55 127/69 114/75 124/74   BP Location:       Patient Position:       Pulse: 82 80 86 84   Resp:       Temp:       TempSrc:       SpO2: 97% 97% 96% 95%   Weight:       Height:         Medications   cefuroxime (CEFTIN) tablet 500 mg (500 mg Oral Given 9/28/24 2204)     ECG/EMG Results (last 24 hours)       ** No results found for the last 24 hours. **          ECG 12 Lead ED Triage Standing Order; Weak / Dizzy / AMS   Final Result   Test Reason : ED Triage Standing Order~   Blood Pressure :   */*   mmHG   Vent. Rate :  80 BPM     Atrial Rate :  80 BPM      P-R Int : 130 ms          QRS Dur :  70 ms       QT Int : 404 ms       P-R-T Axes :  88  10  85 degrees      QTc Int : 465 ms      Normal sinus rhythm   Nonspecific ST and T wave abnormality   Abnormal ECG   When compared with ECG of 23-FEB-2023 15:54,   Nonspecific T wave abnormality, improved in Inferior leads   Confirmed by MD Rose Michael ([id number]) on 9/28/2024 10:28:26 PM      Referred By: gemma           Confirmed By: Brando Rose MD                      Medical Decision Making  Problems Addressed:  Acute UTI: complicated acute  illness or injury  Closed nondisplaced fracture of phalanx of right little finger, unspecified phalanx, initial encounter: complicated acute illness or injury  History of dementia: complicated acute illness or injury    Amount and/or Complexity of Data Reviewed  Labs: ordered.  Radiology: ordered.  ECG/medicine tests: ordered.    Risk  Prescription drug management.        Final diagnoses:   Acute UTI   History of dementia   Closed nondisplaced fracture of phalanx of right little finger, unspecified phalanx, initial encounter       ED Disposition  ED Disposition       ED Disposition   Discharge    Condition   Stable    Comment   --               Frandy Kuhn MD  [address]  [address]  [place] [id number]  [phone]    In 1 week      Effie Ruff MD  [address] [id number] [phone]    In 1 week           Medication List        New Prescriptions      cefuroxime 250 MG tablet  Commonly known as: CEFTIN  Take 2 tablets by mouth 2 (Two) Times a Day for 7 days.               Where to Get Your Medications        These medications were sent to [facility] [address] [place], [place] [address], [place] [address] [phone] [place] [phone] [address] [address]      Phone: [phone]   cefuroxime 250 MG tablet            Carlo Rose MD  09/29/24 0100

## 2024-10-01 LAB — BACTERIA SPEC AEROBE CULT: ABNORMAL

## 2024-10-06 ENCOUNTER — HOSPITAL ENCOUNTER (INPATIENT)
Facility: HOSPITAL | Age: 84
LOS: 6 days | Discharge: ANOTHER HOSPITAL | End: 2024-10-14
Attending: EMERGENCY MEDICINE | Admitting: INTERNAL MEDICINE
Payer: MEDICARE

## 2024-10-06 ENCOUNTER — APPOINTMENT (OUTPATIENT)
Dept: CT IMAGING | Facility: HOSPITAL | Age: 84
End: 2024-10-06
Payer: MEDICARE

## 2024-10-06 ENCOUNTER — APPOINTMENT (OUTPATIENT)
Dept: GENERAL RADIOLOGY | Facility: HOSPITAL | Age: 84
End: 2024-10-06
Payer: MEDICARE

## 2024-10-06 DIAGNOSIS — N28.9 MILD RENAL INSUFFICIENCY: ICD-10-CM

## 2024-10-06 DIAGNOSIS — R41.82 ALTERED MENTAL STATUS, UNSPECIFIED ALTERED MENTAL STATUS TYPE: Primary | ICD-10-CM

## 2024-10-06 DIAGNOSIS — R13.10 DYSPHAGIA, UNSPECIFIED TYPE: ICD-10-CM

## 2024-10-06 DIAGNOSIS — E86.0 DEHYDRATION: ICD-10-CM

## 2024-10-06 DIAGNOSIS — Z87.440 RECENT URINARY TRACT INFECTION: ICD-10-CM

## 2024-10-06 LAB
ALBUMIN SERPL-MCNC: 4.6 G/DL (ref 3.5–5.2)
ALBUMIN/GLOB SERPL: 1.7 G/DL
ALP SERPL-CCNC: 73 U/L (ref 39–117)
ALT SERPL W P-5'-P-CCNC: 24 U/L (ref 1–33)
ANION GAP SERPL CALCULATED.3IONS-SCNC: 17 MMOL/L (ref 5–15)
AST SERPL-CCNC: 41 U/L (ref 1–32)
BACTERIA UR QL AUTO: ABNORMAL /HPF
BASOPHILS # BLD AUTO: 0.1 10*3/MM3 (ref 0–0.2)
BASOPHILS NFR BLD AUTO: 1.1 % (ref 0–1.5)
BILIRUB SERPL-MCNC: 0.9 MG/DL (ref 0–1.2)
BILIRUB UR QL STRIP: NEGATIVE
BUN SERPL-MCNC: 26 MG/DL (ref 8–23)
BUN/CREAT SERPL: 15.2 (ref 7–25)
CALCIUM SPEC-SCNC: 9.9 MG/DL (ref 8.6–10.5)
CHLORIDE SERPL-SCNC: 102 MMOL/L (ref 98–107)
CLARITY UR: CLEAR
CO2 SERPL-SCNC: 19 MMOL/L (ref 22–29)
COLOR UR: YELLOW
CREAT SERPL-MCNC: 1.71 MG/DL (ref 0.57–1)
D-LACTATE SERPL-SCNC: 1.1 MMOL/L (ref 0.5–2)
D-LACTATE SERPL-SCNC: 3.5 MMOL/L (ref 0.5–2)
DEPRECATED RDW RBC AUTO: 44.8 FL (ref 37–54)
EGFRCR SERPLBLD CKD-EPI 2021: 29.2 ML/MIN/1.73
EOSINOPHIL # BLD AUTO: 0.17 10*3/MM3 (ref 0–0.4)
EOSINOPHIL NFR BLD AUTO: 1.8 % (ref 0.3–6.2)
ERYTHROCYTE [DISTWIDTH] IN BLOOD BY AUTOMATED COUNT: 14.2 % (ref 12.3–15.4)
GLOBULIN UR ELPH-MCNC: 2.7 GM/DL
GLUCOSE SERPL-MCNC: 102 MG/DL (ref 65–99)
GLUCOSE UR STRIP-MCNC: NEGATIVE MG/DL
HCT VFR BLD AUTO: 45 % (ref 34–46.6)
HGB BLD-MCNC: 14.4 G/DL (ref 12–15.9)
HGB UR QL STRIP.AUTO: NEGATIVE
HYALINE CASTS UR QL AUTO: ABNORMAL /LPF
IMM GRANULOCYTES # BLD AUTO: 0.03 10*3/MM3 (ref 0–0.05)
IMM GRANULOCYTES NFR BLD AUTO: 0.3 % (ref 0–0.5)
KETONES UR QL STRIP: ABNORMAL
LEUKOCYTE ESTERASE UR QL STRIP.AUTO: NEGATIVE
LYMPHOCYTES # BLD AUTO: 0.93 10*3/MM3 (ref 0.7–3.1)
LYMPHOCYTES NFR BLD AUTO: 10.1 % (ref 19.6–45.3)
MCH RBC QN AUTO: 27.6 PG (ref 26.6–33)
MCHC RBC AUTO-ENTMCNC: 32 G/DL (ref 31.5–35.7)
MCV RBC AUTO: 86.4 FL (ref 79–97)
MONOCYTES # BLD AUTO: 0.66 10*3/MM3 (ref 0.1–0.9)
MONOCYTES NFR BLD AUTO: 7.1 % (ref 5–12)
NEUTROPHILS NFR BLD AUTO: 7.35 10*3/MM3 (ref 1.7–7)
NEUTROPHILS NFR BLD AUTO: 79.6 % (ref 42.7–76)
NITRITE UR QL STRIP: NEGATIVE
NRBC BLD AUTO-RTO: 0 /100 WBC (ref 0–0.2)
NT-PROBNP SERPL-MCNC: 195.9 PG/ML (ref 0–1800)
PH UR STRIP.AUTO: 5.5 [PH] (ref 5–8)
PLATELET # BLD AUTO: 330 10*3/MM3 (ref 140–450)
PMV BLD AUTO: 9.5 FL (ref 6–12)
POTASSIUM SERPL-SCNC: 4.1 MMOL/L (ref 3.5–5.2)
PROT SERPL-MCNC: 7.3 G/DL (ref 6–8.5)
PROT UR QL STRIP: ABNORMAL
QT INTERVAL: 382 MS
QTC INTERVAL: 467 MS
RBC # BLD AUTO: 5.21 10*6/MM3 (ref 3.77–5.28)
RBC # UR STRIP: ABNORMAL /HPF
REF LAB TEST METHOD: ABNORMAL
SODIUM SERPL-SCNC: 138 MMOL/L (ref 136–145)
SP GR UR STRIP: 1.03 (ref 1–1.03)
SQUAMOUS #/AREA URNS HPF: ABNORMAL /HPF
TROPONIN T SERPL HS-MCNC: 19 NG/L
UROBILINOGEN UR QL STRIP: ABNORMAL
WBC # UR STRIP: ABNORMAL /HPF
WBC NRBC COR # BLD AUTO: 9.24 10*3/MM3 (ref 3.4–10.8)

## 2024-10-06 PROCEDURE — G0378 HOSPITAL OBSERVATION PER HR: HCPCS

## 2024-10-06 PROCEDURE — 71045 X-RAY EXAM CHEST 1 VIEW: CPT

## 2024-10-06 PROCEDURE — 82077 ASSAY SPEC XCP UR&BREATH IA: CPT | Performed by: STUDENT IN AN ORGANIZED HEALTH CARE EDUCATION/TRAINING PROGRAM

## 2024-10-06 PROCEDURE — P9612 CATHETERIZE FOR URINE SPEC: HCPCS

## 2024-10-06 PROCEDURE — 99285 EMERGENCY DEPT VISIT HI MDM: CPT

## 2024-10-06 PROCEDURE — 36415 COLL VENOUS BLD VENIPUNCTURE: CPT

## 2024-10-06 PROCEDURE — 83605 ASSAY OF LACTIC ACID: CPT | Performed by: EMERGENCY MEDICINE

## 2024-10-06 PROCEDURE — 93005 ELECTROCARDIOGRAM TRACING: CPT | Performed by: EMERGENCY MEDICINE

## 2024-10-06 PROCEDURE — 70450 CT HEAD/BRAIN W/O DYE: CPT

## 2024-10-06 PROCEDURE — 84484 ASSAY OF TROPONIN QUANT: CPT | Performed by: EMERGENCY MEDICINE

## 2024-10-06 PROCEDURE — 80053 COMPREHEN METABOLIC PANEL: CPT | Performed by: EMERGENCY MEDICINE

## 2024-10-06 PROCEDURE — 25010000002 CEFTRIAXONE PER 250 MG: Performed by: EMERGENCY MEDICINE

## 2024-10-06 PROCEDURE — 87040 BLOOD CULTURE FOR BACTERIA: CPT | Performed by: EMERGENCY MEDICINE

## 2024-10-06 PROCEDURE — 83880 ASSAY OF NATRIURETIC PEPTIDE: CPT | Performed by: EMERGENCY MEDICINE

## 2024-10-06 PROCEDURE — 85025 COMPLETE CBC W/AUTO DIFF WBC: CPT | Performed by: EMERGENCY MEDICINE

## 2024-10-06 PROCEDURE — 81001 URINALYSIS AUTO W/SCOPE: CPT | Performed by: EMERGENCY MEDICINE

## 2024-10-06 PROCEDURE — 84443 ASSAY THYROID STIM HORMONE: CPT | Performed by: STUDENT IN AN ORGANIZED HEALTH CARE EDUCATION/TRAINING PROGRAM

## 2024-10-06 RX ORDER — SODIUM CHLORIDE 0.9 % (FLUSH) 0.9 %
10 SYRINGE (ML) INJECTION AS NEEDED
Status: DISCONTINUED | OUTPATIENT
Start: 2024-10-06 | End: 2024-10-14 | Stop reason: HOSPADM

## 2024-10-06 RX ADMIN — SODIUM CHLORIDE 1000 MG: 900 INJECTION INTRAVENOUS at 20:39

## 2024-10-06 NOTE — ED PROVIDER NOTES
[facility]    EMERGENCY DEPARTMENT ENCOUNTER      Pt Name: Marycruz Funez  MRN: [id number]  YOB: 1940  Date of evaluation: 10/6/2024  Provider: Alex Lee DO    CHIEF COMPLAINT       Chief Complaint   Patient presents with    Altered Mental Status       HPI  Stated Reason for Visit: PT NORMALLY LIVES ALONE, BUT HAS BEEN UNABLE TO BE ALONE FOR THE LAST WEEK D/T CONFUSION AND HALLUCINATIONS. PT WAS HERE 1 WEEK AGO, DX WITH UTI, ABX SWITCHED TO BACTRIM BY PCP MID WEEK. PT BROUGHT TO ED BY SON AND DAUGHTER BECAUSE CONFUSION, BEHAVIOR, AND HALLUCINATIONS ARE GETTING WORSE. History Obtained From: family       HISTORY OF PRESENT ILLNESS  (Location/Symptom, Timing/Onset, Context/Setting, Quality, Duration, Modifying Factors, Severity.)   Marycruz Funez is a 84 y.o. female who presents to the emergency department with her children at her bedside secondary to concern for increasing weakness, confusions hallucinations that she was recently seen and diagnosed with a possible urinary tract infection and started on antibiotic, went to her PCP and has been on Bactrim for the last few days.  The family notes she has been progressively weaker not eating and drinking, hallucinating, as I do know she has a history of underlying dementia but this has been much more significant progressive over the last week or so.  They are at a point now where they cannot care for the patient can even get her up to get her to appointments or even to the emergency department this evening.  The patient herself is very limited in her responses, stating she just does not want to do anything does not want to eat or drink.      Nursing notes were reviewed.      PAST MEDICAL HISTORY   No past medical history on file.      SURGICAL HISTORY       Past Surgical History:   Procedure Laterality Date    BREAST CYST ASPIRATION Left 2002    HYSTERECTOMY  1990         CURRENT MEDICATIONS       Current Facility-Administered Medications:      [COMPLETED] Insert Peripheral IV, , , Once **AND** sodium chloride 0.9 % flush 10 mL, 10 mL, Intravenous, PRN, Alex Lee, DO  No current outpatient medications on file.    ALLERGIES     Patient has no known allergies.    FAMILY HISTORY       Family History   Problem Relation Age of Onset    Ovarian cancer Neg Hx     Breast cancer Neg Hx           SOCIAL HISTORY       Social History     Socioeconomic History    Marital status:          PHYSICAL EXAM    (up to 7 for level 4, 8 or more for level 5)     Vitals:    10/06/24 1929 10/06/24 1959 10/06/24 2029 10/06/24 2200   BP: 113/88 125/77 138/80 108/80   Pulse: 91 85 91 87   Resp:       Temp:       TempSrc:       SpO2: 90% 91% 93% 93%   Weight:       Height:           Physical Exam  General : Patient is awake, but very elderly, frail  HEENT: Pupils are equally round, EOMI, conjunctivae clear, oral mucosa is dry  Cardiac: Heart tachycardic rate with regular rhythm, no murmurs, rubs, or gallops  Lungs: Lungs with decreased breath sounds bilaterally, no acute respiratory distress, ox saturations 91% on room air  Abdomen: Abdomen is soft, nontender, nondistended  Musculoskeletal: Generalized muscle atrophy, no focal muscle deficits are appreciated  Neuro: Patient is awake, very weak in general, following most commands but very frail  Dermatology: Skin is warm and dry        DIAGNOSTIC RESULTS     EKG:  All EKGs are interpreted by the Emergency Department Physician who either signs or Co-signs this chart in the absence of a cardiologist.    ECG 12 Lead Other; ams   Final Result   Test Reason : Other~   Blood Pressure :   */*   mmHG   Vent. Rate :  90 BPM     Atrial Rate :  90 BPM      P-R Int : 122 ms          QRS Dur :  66 ms       QT Int : 382 ms       P-R-T Axes :  62  29  54 degrees      QTc Int : 467 ms      Normal sinus rhythm   Normal ECG   When compared with ECG of 28-SEP-2024 19:40,   No significant change was found   Confirmed by CHET QUEEN,  SILVINA ([id number]) on 10/6/2024 8:44:42 PM      Referred By: ED MD           Confirmed By: SILVINA ROSENBERG MD          RADIOLOGY:     [x] Radiologist's Report Reviewed:  XR Chest 1 View   Final Result   Impression:   No acute cardiopulmonary process.         Electronically Signed: Siria Mariee MD     10/6/2024 10:50 PM EDT     Workstation ID: [id number]      CT Head Without Contrast   Final Result   Impression:   No acute intracranial process.            Electronically Signed: Siria Mariee MD     10/6/2024 9:18 PM EDT     Workstation ID: [id number]          I ordered and independently reviewed the above noted radiographic studies.      I viewed images of CT head which showed no acute intracranial abnormality per my independent interpretation.    See radiologist's dictation for official interpretation.      ED BEDSIDE ULTRASOUND:   Performed by ED Physician - none    LABS:  Urine Culture - Urine, Urine, Clean Catch [[id number]] (Order [id number])  Order  Date: 9/28/2024 Department: [facility] EMERGENCY DEPARTMENT Released By: Loki Smith MT Authorizing: Carlo Rose MD     Reprint Order Requisition    Urine Culture - Urine, Urine, Clean Catch (Order #[id number]) on 9/28/24         Contains abnormal data Urine Culture - Urine, Urine, Clean Catch  Order: [id number] - Reflex for Order [id number]  Status: Final result       Visible to patient: Yes (not seen)       Next appt: 10/25/2024 at 11:30 AM in Gastroenterology (Graham Daly MD)    Specimen Information: Urine, Clean Catch   0 Result Notes  Urine Culture >100,000 CFU/mL Escherichia coli Abnormal          Colonization of the urinary tract without infection is common. Treatment is discouraged unless the patient is symptomatic, pregnant, or undergoing an invasive urologic procedure.        Resulting Agency: [facility]     Susceptibility       Escherichia coli     ZACKARY     Amikacin <=2 ug/ml Susceptible     Amoxicillin + Clavulanate 8 ug/ml  Susceptible     Ampicillin >=32 ug/ml Resistant     Ampicillin + Sulbactam 16 ug/ml Intermediate     Cefazolin <=4 ug/ml Susceptible     Cefepime <=1 ug/ml Susceptible     Ceftazidime <=1 ug/ml Susceptible     Ceftriaxone <=1 ug/ml Susceptible     Gentamicin >=16 ug/ml Resistant     Levofloxacin >=8 ug/ml Resistant     Nitrofurantoin <=16 ug/ml Susceptible     Piperacillin + Tazobactam <=4 ug/ml Susceptible     Tobramycin 8 ug/ml Resistant     Trimethoprim + Sulfamethoxazole <=20 ug/ml Susceptible            I have reviewed and interpreted all of the currently available lab results from this visit (if applicable):  Results for orders placed or performed during the hospital encounter of 10/06/24   Comprehensive Metabolic Panel    Specimen: Blood   Result Value Ref Range    Glucose 102 (H) 65 - 99 mg/dL    BUN 26 (H) 8 - 23 mg/dL    Creatinine 1.71 (H) 0.57 - 1.00 mg/dL    Sodium 138 136 - 145 mmol/L    Potassium 4.1 3.5 - 5.2 mmol/L    Chloride 102 98 - 107 mmol/L    CO2 19.0 (L) 22.0 - 29.0 mmol/L    Calcium 9.9 8.6 - 10.5 mg/dL    Total Protein 7.3 6.0 - 8.5 g/dL    Albumin 4.6 3.5 - 5.2 g/dL    ALT (SGPT) 24 1 - 33 U/L    AST (SGOT) 41 (H) 1 - 32 U/L    Alkaline Phosphatase 73 39 - 117 U/L    Total Bilirubin 0.9 0.0 - 1.2 mg/dL    Globulin 2.7 gm/dL    A/G Ratio 1.7 g/dL    BUN/Creatinine Ratio 15.2 7.0 - 25.0    Anion Gap 17.0 (H) 5.0 - 15.0 mmol/L    eGFR 29.2 (L) >60.0 mL/min/1.73   Urinalysis With Microscopic If Indicated (No Culture) - Urine, Catheter    Specimen: Urine, Catheter   Result Value Ref Range    Color, UA Yellow Yellow, Straw    Appearance, UA Clear Clear    pH, UA 5.5 5.0 - 8.0    Specific Gravity, UA 1.033 (H) 1.001 - 1.030    Glucose, UA Negative Negative    Ketones, UA Trace (A) Negative    Bilirubin, UA Negative Negative    Blood, UA Negative Negative    Protein, UA 30 mg/dL (1+) (A) Negative    Leuk Esterase, UA Negative Negative    Nitrite, UA Negative Negative    Urobilinogen, UA 0.2  E.U./dL 0.2 - 1.0 E.U./dL   BNP    Specimen: Blood   Result Value Ref Range    proBNP 195.9 0.0 - 1,800.0 pg/mL   Single High Sensitivity Troponin T    Specimen: Blood   Result Value Ref Range    HS Troponin T 19 (H) <14 ng/L   Lactic Acid, Plasma    Specimen: Blood   Result Value Ref Range    Lactate 3.5 (C) 0.5 - 2.0 mmol/L   CBC Auto Differential    Specimen: Blood   Result Value Ref Range    WBC 9.24 3.40 - 10.80 10*3/mm3    RBC 5.21 3.77 - 5.28 10*6/mm3    Hemoglobin 14.4 12.0 - 15.9 g/dL    Hematocrit 45.0 34.0 - 46.6 %    MCV 86.4 79.0 - 97.0 fL    MCH 27.6 26.6 - 33.0 pg    MCHC 32.0 31.5 - 35.7 g/dL    RDW 14.2 12.3 - 15.4 %    RDW-SD 44.8 37.0 - 54.0 fl    MPV 9.5 6.0 - 12.0 fL    Platelets 330 140 - 450 10*3/mm3    Neutrophil % 79.6 (H) 42.7 - 76.0 %    Lymphocyte % 10.1 (L) 19.6 - 45.3 %    Monocyte % 7.1 5.0 - 12.0 %    Eosinophil % 1.8 0.3 - 6.2 %    Basophil % 1.1 0.0 - 1.5 %    Immature Grans % 0.3 0.0 - 0.5 %    Neutrophils, Absolute 7.35 (H) 1.70 - 7.00 10*3/mm3    Lymphocytes, Absolute 0.93 0.70 - 3.10 10*3/mm3    Monocytes, Absolute 0.66 0.10 - 0.90 10*3/mm3    Eosinophils, Absolute 0.17 0.00 - 0.40 10*3/mm3    Basophils, Absolute 0.10 0.00 - 0.20 10*3/mm3    Immature Grans, Absolute 0.03 0.00 - 0.05 10*3/mm3    nRBC 0.0 0.0 - 0.2 /100 WBC   STAT Lactic Acid, Reflex    Specimen: Blood   Result Value Ref Range    Lactate 1.1 0.5 - 2.0 mmol/L   Urinalysis, Microscopic Only - Urine, Catheter    Specimen: Urine, Catheter   Result Value Ref Range    RBC, UA 0-2 None Seen, 0-2 /HPF    WBC, UA 3-5 (A) None Seen, 0-2 /HPF    Bacteria, UA None Seen None Seen, Trace /HPF    Squamous Epithelial Cells, UA 0-2 None Seen, 0-2 /HPF    Hyaline Casts, UA 0-6 0 - 6 /LPF    Methodology Automated Microscopy    ECG 12 Lead Other; ams   Result Value Ref Range    QT Interval 382 ms    QTC Interval 467 ms        If labs were ordered, I independently reviewed the results and considered them in treating the  patient.      EMERGENCY DEPARTMENT COURSE and DIFFERENTIAL DIAGNOSIS/MDM:   Vitals:  AS OF 23:22 EDT    BP - 108/80  HR - 87  TEMP - 98.6 °F (37 °C) (Oral)  O2 SATS - 93%      Orders placed during this visit:  Orders Placed This Encounter   Procedures    Blood Culture - Blood,    Blood Culture - Blood,    CT Head Without Contrast    XR Chest 1 View    Comprehensive Metabolic Panel    Urinalysis With Microscopic If Indicated (No Culture) - Urine, Catheter    BNP    Single High Sensitivity Troponin T    Lactic Acid, Plasma    CBC Auto Differential    STAT Lactic Acid, Reflex    Urinalysis, Microscopic Only - Urine, Clean Catch    ECG 12 Lead Other; ams    Insert Peripheral IV    CBC & Differential       All labs have been independently reviewed by me.  All radiology studies have been reviewed by me and the radiologist dictating the report.  All EKG's have been independently viewed and interpreted by me.      Discussion below represents my analysis of pertinent findings related to patient's condition, differential diagnosis, treatment plan and final disposition.    Differential diagnosis:  The differential diagnosis associated with the patient's presentation includes: Failure to thrive in adult, urosepsis, dehydration, electrolyte abnormalities    Additional sources  Discussed/ obtained information from independent historians:   [] Spouse  [] Parent  [x] Family member, daughter and son at bedside  [] Friend  [] EMS   [] Other:    External (non-ED) record review:   [] Inpatient record:   [] Office record:   [] Outpatient record:   [] Prior Outpatient labs:   [] Prior Outpatient radiology:   [] Primary Care record:   [] Outside ED record:   [] Other:     Patient's care impacted by:   [] Diabetes  [] Hypertension  [] CHF  [] Hyperlipidemia  [] Coronary Artery Disease   [] COPD   [] Cancer   [] Tobacco Abuse   [] Substance Abuse    [] Other:     Care significantly affected by Social Determinants of Health (housing and  economic circumstances, unemployment)    [] Yes     [x] No   If yes, Patient's care significantly limited by Social Determinants of Health including:   [] Inadequate housing   [] Low income   [] Alcoholism and drug addiction in family   [] Problems related to primary support group   [] Unemployment   [] Problems related to employment   [] Other Social Determinants of Health:       MEDICATIONS ADMINISTERED IN ED:  Medications   sodium chloride 0.9 % flush 10 mL (has no administration in time range)   cefTRIAXone (ROCEPHIN) 1,000 mg in sodium chloride 0.9 % 100 mL MBP (0 mg Intravenous Stopped 10/6/24 2128)              This unfortunate 84-year-old female was had progressive weakness, recent urinary tract infection, has not been eating and drinking well worsening over the last week or 2.  She has been on multiple rounds of antibiotics and has progressively worsened with weakness, dehydration, hallucinations.  She is tachycardic, initial assessment, afebrile, generally weak, very frail, appears dehydrated.  She will be getting IV fluids, blood work labs and imaging obtained including a CT head.  Results as above.  White count 9.2 with a stable H&H.  Urinalysis without acute infection etiology.  She does have mild renal sufficiency creatinine 1.71 with a BUN of 26, elevated lactic acid of 3.5, she was given IV fluids, recheck lactic acid is improved to 1.1.  CT scan of the head does not reveal any acute intracranial normality, chest x-ray stable.  Family concerned about her increasing dementia they are unable to care for her, may improve with fluid hydration but certainly at this point will require admission to the hospital, possible placement discussions.  Case discussed with hospitalist Dr. Reese.    PROCEDURES:  Procedures    CRITICAL CARE TIME    Total Critical Care time was 0 minutes, excluding separately reportable procedures.   There was a high probability of clinically significant/life threatening  deterioration in the patient's condition which required my urgent intervention.      FINAL IMPRESSION      1. Altered mental status, unspecified altered mental status type    2. Dehydration    3. Mild renal insufficiency    4. Recent urinary tract infection          DISPOSITION/PLAN     ED Disposition       ED Disposition   Decision to Admit    Condition   --    Comment   --               Comment: Please note this report has been produced using speech recognition software.      Alex Lee DO  Attending Emergency Physician         Alex Lee DO  10/06/24 [id number]

## 2024-10-07 LAB
25(OH)D3 SERPL-MCNC: 21.2 NG/ML (ref 30–100)
AMPHET+METHAMPHET UR QL: NEGATIVE
AMPHETAMINES UR QL: NEGATIVE
ANION GAP SERPL CALCULATED.3IONS-SCNC: 16 MMOL/L (ref 5–15)
BARBITURATES UR QL SCN: NEGATIVE
BENZODIAZ UR QL SCN: POSITIVE
BUN SERPL-MCNC: 29 MG/DL (ref 8–23)
BUN/CREAT SERPL: 20.1 (ref 7–25)
BUPRENORPHINE SERPL-MCNC: NEGATIVE NG/ML
CALCIUM SPEC-SCNC: 9.4 MG/DL (ref 8.6–10.5)
CANNABINOIDS SERPL QL: NEGATIVE
CHLORIDE SERPL-SCNC: 101 MMOL/L (ref 98–107)
CO2 SERPL-SCNC: 21 MMOL/L (ref 22–29)
COCAINE UR QL: NEGATIVE
CREAT SERPL-MCNC: 1.44 MG/DL (ref 0.57–1)
DEPRECATED RDW RBC AUTO: 45.7 FL (ref 37–54)
EGFRCR SERPLBLD CKD-EPI 2021: 35.9 ML/MIN/1.73
ERYTHROCYTE [DISTWIDTH] IN BLOOD BY AUTOMATED COUNT: 14.2 % (ref 12.3–15.4)
ETHANOL BLD-MCNC: <10 MG/DL (ref 0–10)
FENTANYL UR-MCNC: NEGATIVE NG/ML
GLUCOSE SERPL-MCNC: 75 MG/DL (ref 65–99)
HCT VFR BLD AUTO: 40.4 % (ref 34–46.6)
HGB BLD-MCNC: 12.8 G/DL (ref 12–15.9)
MAGNESIUM SERPL-MCNC: 2.1 MG/DL (ref 1.6–2.4)
MCH RBC QN AUTO: 27.6 PG (ref 26.6–33)
MCHC RBC AUTO-ENTMCNC: 31.7 G/DL (ref 31.5–35.7)
MCV RBC AUTO: 87.3 FL (ref 79–97)
METHADONE UR QL SCN: NEGATIVE
OPIATES UR QL: NEGATIVE
OXYCODONE UR QL SCN: NEGATIVE
PCP UR QL SCN: NEGATIVE
PLATELET # BLD AUTO: 306 10*3/MM3 (ref 140–450)
PMV BLD AUTO: 9.7 FL (ref 6–12)
POTASSIUM SERPL-SCNC: 3.4 MMOL/L (ref 3.5–5.2)
RBC # BLD AUTO: 4.63 10*6/MM3 (ref 3.77–5.28)
SODIUM SERPL-SCNC: 138 MMOL/L (ref 136–145)
TRICYCLICS UR QL SCN: NEGATIVE
TSH SERPL DL<=0.05 MIU/L-ACNC: 1.48 UIU/ML (ref 0.27–4.2)
VIT B12 BLD-MCNC: 441 PG/ML (ref 211–946)
WBC NRBC COR # BLD AUTO: 10.53 10*3/MM3 (ref 3.4–10.8)

## 2024-10-07 PROCEDURE — G0378 HOSPITAL OBSERVATION PER HR: HCPCS

## 2024-10-07 PROCEDURE — 80048 BASIC METABOLIC PNL TOTAL CA: CPT | Performed by: STUDENT IN AN ORGANIZED HEALTH CARE EDUCATION/TRAINING PROGRAM

## 2024-10-07 PROCEDURE — 82607 VITAMIN B-12: CPT | Performed by: STUDENT IN AN ORGANIZED HEALTH CARE EDUCATION/TRAINING PROGRAM

## 2024-10-07 PROCEDURE — 83735 ASSAY OF MAGNESIUM: CPT | Performed by: STUDENT IN AN ORGANIZED HEALTH CARE EDUCATION/TRAINING PROGRAM

## 2024-10-07 PROCEDURE — 85027 COMPLETE CBC AUTOMATED: CPT | Performed by: STUDENT IN AN ORGANIZED HEALTH CARE EDUCATION/TRAINING PROGRAM

## 2024-10-07 PROCEDURE — 92610 EVALUATE SWALLOWING FUNCTION: CPT

## 2024-10-07 PROCEDURE — 25010000002 HEPARIN (PORCINE) PER 1000 UNITS: Performed by: STUDENT IN AN ORGANIZED HEALTH CARE EDUCATION/TRAINING PROGRAM

## 2024-10-07 PROCEDURE — 99223 1ST HOSP IP/OBS HIGH 75: CPT | Performed by: PSYCHIATRY & NEUROLOGY

## 2024-10-07 PROCEDURE — 25010000002 THIAMINE PER 100 MG: Performed by: STUDENT IN AN ORGANIZED HEALTH CARE EDUCATION/TRAINING PROGRAM

## 2024-10-07 PROCEDURE — 97162 PT EVAL MOD COMPLEX 30 MIN: CPT

## 2024-10-07 PROCEDURE — 99222 1ST HOSP IP/OBS MODERATE 55: CPT | Performed by: STUDENT IN AN ORGANIZED HEALTH CARE EDUCATION/TRAINING PROGRAM

## 2024-10-07 PROCEDURE — 82306 VITAMIN D 25 HYDROXY: CPT | Performed by: STUDENT IN AN ORGANIZED HEALTH CARE EDUCATION/TRAINING PROGRAM

## 2024-10-07 PROCEDURE — 80307 DRUG TEST PRSMV CHEM ANLYZR: CPT | Performed by: STUDENT IN AN ORGANIZED HEALTH CARE EDUCATION/TRAINING PROGRAM

## 2024-10-07 RX ORDER — ASPIRIN 81 MG/1
81 TABLET ORAL DAILY
Status: DISCONTINUED | OUTPATIENT
Start: 2024-10-07 | End: 2024-10-14 | Stop reason: HOSPADM

## 2024-10-07 RX ORDER — ATORVASTATIN CALCIUM 20 MG/1
20 TABLET, FILM COATED ORAL DAILY
COMMUNITY

## 2024-10-07 RX ORDER — SODIUM CHLORIDE 0.9 % (FLUSH) 0.9 %
10 SYRINGE (ML) INJECTION AS NEEDED
Status: DISCONTINUED | OUTPATIENT
Start: 2024-10-07 | End: 2024-10-14 | Stop reason: HOSPADM

## 2024-10-07 RX ORDER — QUETIAPINE FUMARATE 25 MG/1
12.5 TABLET, FILM COATED ORAL NIGHTLY
Status: DISCONTINUED | OUTPATIENT
Start: 2024-10-07 | End: 2024-10-08

## 2024-10-07 RX ORDER — HEPARIN SODIUM 5000 [USP'U]/ML
5000 INJECTION, SOLUTION INTRAVENOUS; SUBCUTANEOUS EVERY 8 HOURS SCHEDULED
Status: DISCONTINUED | OUTPATIENT
Start: 2024-10-07 | End: 2024-10-14 | Stop reason: HOSPADM

## 2024-10-07 RX ORDER — FOLIC ACID/VIT B COMPLEX AND C 0.8 MG
1 TABLET ORAL DAILY
Status: DISCONTINUED | OUTPATIENT
Start: 2024-10-07 | End: 2024-10-14 | Stop reason: HOSPADM

## 2024-10-07 RX ORDER — PRIMIDONE 50 MG/1
25 TABLET ORAL EVERY 12 HOURS SCHEDULED
Status: DISCONTINUED | OUTPATIENT
Start: 2024-10-07 | End: 2024-10-14 | Stop reason: HOSPADM

## 2024-10-07 RX ORDER — PAROXETINE 40 MG/1
40 TABLET, FILM COATED ORAL EVERY MORNING
COMMUNITY

## 2024-10-07 RX ORDER — POLYETHYLENE GLYCOL 3350 17 G/17G
17 POWDER, FOR SOLUTION ORAL DAILY PRN
Status: DISCONTINUED | OUTPATIENT
Start: 2024-10-07 | End: 2024-10-09

## 2024-10-07 RX ORDER — PAROXETINE 20 MG/1
40 TABLET, FILM COATED ORAL DAILY
Status: DISCONTINUED | OUTPATIENT
Start: 2024-10-07 | End: 2024-10-14 | Stop reason: HOSPADM

## 2024-10-07 RX ORDER — AMOXICILLIN 250 MG
2 CAPSULE ORAL 2 TIMES DAILY PRN
Status: DISCONTINUED | OUTPATIENT
Start: 2024-10-07 | End: 2024-10-09

## 2024-10-07 RX ORDER — ACETAMINOPHEN 325 MG/1
650 TABLET ORAL EVERY 4 HOURS PRN
Status: DISCONTINUED | OUTPATIENT
Start: 2024-10-07 | End: 2024-10-14 | Stop reason: HOSPADM

## 2024-10-07 RX ORDER — BUPROPION HYDROCHLORIDE 150 MG/1
150 TABLET ORAL DAILY
Status: DISCONTINUED | OUTPATIENT
Start: 2024-10-07 | End: 2024-10-07

## 2024-10-07 RX ORDER — ACETAMINOPHEN 650 MG/1
650 SUPPOSITORY RECTAL EVERY 4 HOURS PRN
Status: DISCONTINUED | OUTPATIENT
Start: 2024-10-07 | End: 2024-10-14 | Stop reason: HOSPADM

## 2024-10-07 RX ORDER — RIVASTIGMINE 4.6 MG/24H
1 PATCH, EXTENDED RELEASE TRANSDERMAL NIGHTLY
Status: DISCONTINUED | OUTPATIENT
Start: 2024-10-07 | End: 2024-10-09

## 2024-10-07 RX ORDER — ACETAMINOPHEN 160 MG/5ML
650 SOLUTION ORAL EVERY 4 HOURS PRN
Status: DISCONTINUED | OUTPATIENT
Start: 2024-10-07 | End: 2024-10-14 | Stop reason: HOSPADM

## 2024-10-07 RX ORDER — POTASSIUM CHLORIDE 1.5 G/1.58G
20 POWDER, FOR SOLUTION ORAL ONCE
Status: COMPLETED | OUTPATIENT
Start: 2024-10-07 | End: 2024-10-07

## 2024-10-07 RX ORDER — BUPROPION HYDROCHLORIDE 150 MG/1
150 TABLET ORAL DAILY
COMMUNITY

## 2024-10-07 RX ORDER — ATORVASTATIN CALCIUM 20 MG/1
20 TABLET, FILM COATED ORAL DAILY
Status: DISCONTINUED | OUTPATIENT
Start: 2024-10-07 | End: 2024-10-14 | Stop reason: HOSPADM

## 2024-10-07 RX ORDER — BISACODYL 10 MG
10 SUPPOSITORY, RECTAL RECTAL DAILY PRN
Status: DISCONTINUED | OUTPATIENT
Start: 2024-10-07 | End: 2024-10-14 | Stop reason: HOSPADM

## 2024-10-07 RX ORDER — SODIUM CHLORIDE 0.9 % (FLUSH) 0.9 %
10 SYRINGE (ML) INJECTION EVERY 12 HOURS SCHEDULED
Status: DISCONTINUED | OUTPATIENT
Start: 2024-10-07 | End: 2024-10-14 | Stop reason: HOSPADM

## 2024-10-07 RX ORDER — BISACODYL 5 MG/1
5 TABLET, DELAYED RELEASE ORAL DAILY PRN
Status: DISCONTINUED | OUTPATIENT
Start: 2024-10-07 | End: 2024-10-14 | Stop reason: HOSPADM

## 2024-10-07 RX ORDER — ASPIRIN 81 MG/1
81 TABLET ORAL DAILY
COMMUNITY

## 2024-10-07 RX ADMIN — ATORVASTATIN CALCIUM 20 MG: 20 TABLET, FILM COATED ORAL at 15:37

## 2024-10-07 RX ADMIN — HEPARIN SODIUM 5000 UNITS: 5000 INJECTION INTRAVENOUS; SUBCUTANEOUS at 15:39

## 2024-10-07 RX ADMIN — Medication 10 ML: at 09:18

## 2024-10-07 RX ADMIN — POTASSIUM CHLORIDE 20 MEQ: 1.5 POWDER, FOR SOLUTION ORAL at 15:38

## 2024-10-07 RX ADMIN — Medication 10 ML: at 21:23

## 2024-10-07 RX ADMIN — QUETIAPINE FUMARATE 12.5 MG: 25 TABLET ORAL at 20:10

## 2024-10-07 RX ADMIN — THIAMINE HYDROCHLORIDE 250 MG: 100 INJECTION, SOLUTION INTRAMUSCULAR; INTRAVENOUS at 02:19

## 2024-10-07 RX ADMIN — ASPIRIN 81 MG: 81 TABLET, COATED ORAL at 15:38

## 2024-10-07 RX ADMIN — PRIMIDONE 25 MG: 50 TABLET ORAL at 20:10

## 2024-10-07 RX ADMIN — THIAMINE HYDROCHLORIDE 250 MG: 100 INJECTION, SOLUTION INTRAMUSCULAR; INTRAVENOUS at 20:15

## 2024-10-07 RX ADMIN — THIAMINE HYDROCHLORIDE 250 MG: 100 INJECTION, SOLUTION INTRAMUSCULAR; INTRAVENOUS at 15:38

## 2024-10-07 RX ADMIN — RIVASTIGMINE 1 PATCH: 4.6 PATCH TRANSDERMAL at 20:11

## 2024-10-07 RX ADMIN — Medication 10 ML: at 02:20

## 2024-10-07 RX ADMIN — Medication 5 MG: at 20:10

## 2024-10-07 RX ADMIN — Medication 1 TABLET: at 15:37

## 2024-10-07 RX ADMIN — HEPARIN SODIUM 5000 UNITS: 5000 INJECTION INTRAVENOUS; SUBCUTANEOUS at 20:10

## 2024-10-07 RX ADMIN — HEPARIN SODIUM 5000 UNITS: 5000 INJECTION INTRAVENOUS; SUBCUTANEOUS at 05:04

## 2024-10-07 RX ADMIN — PAROXETINE HYDROCHLORIDE 40 MG: 20 TABLET, FILM COATED ORAL at 15:37

## 2024-10-07 NOTE — CONSULTS
"Neurology    Referring provider:   No referring provider defined for this encounter.    Reason for Consultation: Behavior issues    Chief complaint: Confusion    History of present illness: 84-year-old woman seen for Dr. Negro for evaluation of confusion.    The family tells me that she has been having some mild memory troubles for the last several years beginning with repetitive stories.    She has had some mild problems up till 2 weeks ago when she became acutely worse.  She has been out wandering in the rain looking for her granddaughter who is now 30 years old.  She is also gotten lost driving.    The family has taken away her car keys.    She has had no falls and been tested positive for benzos and her drug screen.    She also has a prominent tremor.    Her son indicates that she has been \"quirky\" most of her life but according to see has been getting significantly worse.    Is also been having auditory hallucinations which she cannot distinguish from reality.    She has no other complaints.        Review of Systems: Patient is unreliable.    Home meds:   Medications Prior to Admission   Medication Sig Dispense Refill Last Dose    aspirin 81 MG EC tablet Take 1 tablet by mouth Daily.   10/6/2024    atorvastatin (LIPITOR) 20 MG tablet Take 1 tablet by mouth Daily.   10/6/2024    buPROPion XL (WELLBUTRIN XL) 150 MG 24 hr tablet Take 1 tablet by mouth Daily.   10/6/2024    PARoxetine (PAXIL) 40 MG tablet Take 1 tablet by mouth Every Morning.   10/6/2024       History  History reviewed. No pertinent past medical history.,   Past Surgical History:   Procedure Laterality Date    BREAST CYST ASPIRATION Left 2002    HYSTERECTOMY  1990   ,   Family History   Problem Relation Age of Onset    Ovarian cancer Neg Hx     Breast cancer Neg Hx    ,   Social History     Tobacco Use    Smoking status: Never    Smokeless tobacco: Never   Vaping Use    Vaping status: Never Used   Substance Use Topics    Alcohol use: Never    Drug " "use: Never    and Allergies:  Patient has no known allergies.,    Vital Signs   Blood pressure 136/73, pulse 99, temperature 98.2 °F (36.8 °C), temperature source Oral, resp. rate 16, height 167.6 cm (66\"), weight 46.3 kg (102 lb), SpO2 94%, not currently breastfeeding.  Body mass index is 16.46 kg/m².    Physical Exam:   General: Talkative white female with a push of speech              Head: Atraumatic              Neck: Supple no bruit              Resp: Normal breath sound              Cor: Regular rhythm              Extremities: No edema              Skin: Warm and dry              Neuro: Pleasant white female who is obviously extremely confused.  She does not know where she is or what she is doing or how she got here.    Her speech is rambling and coherent but like content.  She does not have any understanding of her's current situation.    Speech by itself though is articulate and fluent with no dysarthria.    Cranial nerves show full eye movements equal pupils.    Facial movement sensation are normal bilaterally.    Palate elevates normally tongue protrudes normally.    Reflexes are plus minus throughout.    Motor testing shows a prominent resting tremor which is worse with action.    Her muscle tone by contrast is normal.    She has no particular focal weakness.        Results Review: Urine drug screen was positive for benzos.    CT was personally reviewed and shows age-related changes.        Labs: Routine labs show potassium 3.4 with a BUN of 29 and a creatinine of 1.44.    Vitamin B12 level was 441    Urinalysis shows 3-5 white cells with no bacteria noted.  Lab Results (last 72 hours)       Procedure Component Value Units Date/Time    Vitamin B12 [[id number]]  (Normal) Collected: 10/07/24 0811    Specimen: Blood Updated: 10/07/24 1241     Vitamin B-12 441 pg/mL     Narrative:      Results may be falsely increased if patient taking Biotin.      Vitamin D,25-Hydroxy [[id number]]  (Abnormal) Collected: " 10/07/24 0811    Specimen: Blood Updated: 10/07/24 1241     25 Hydroxy, Vitamin D 21.2 ng/ml     Narrative:      Reference Range for Total Vitamin D 25(OH)     Deficiency <20.0 ng/mL   Insufficiency 21-29 ng/mL   Sufficiency  ng/mL  Toxicity >100 ng/ml      Basic Metabolic Panel [[id number]]  (Abnormal) Collected: 10/07/24 0811    Specimen: Blood Updated: 10/07/24 0911     Glucose 75 mg/dL      BUN 29 mg/dL      Creatinine 1.44 mg/dL      Sodium 138 mmol/L      Potassium 3.4 mmol/L      Chloride 101 mmol/L      CO2 21.0 mmol/L      Calcium 9.4 mg/dL      BUN/Creatinine Ratio 20.1     Anion Gap 16.0 mmol/L      eGFR 35.9 mL/min/1.73     Narrative:      GFR Normal >60  Chronic Kidney Disease <60  Kidney Failure <15    The GFR formula is only valid for adults with stable renal function between ages 18 and 70.    Magnesium [[id number]]  (Normal) Collected: 10/07/24 0811    Specimen: Blood Updated: 10/07/24 0911     Magnesium 2.1 mg/dL     CBC (No Diff) [[id number]]  (Normal) Collected: 10/07/24 0811    Specimen: Blood Updated: 10/07/24 0840     WBC 10.53 10*3/mm3      RBC 4.63 10*6/mm3      Hemoglobin 12.8 g/dL      Hematocrit 40.4 %      MCV 87.3 fL      MCH 27.6 pg      MCHC 31.7 g/dL      RDW 14.2 %      RDW-SD 45.7 fl      MPV 9.7 fL      Platelets 306 10*3/mm3     Fentanyl, Urine - Urine, Clean Catch [[id number]]  (Normal) Collected: 10/07/24 0254    Specimen: Urine, Clean Catch Updated: 10/07/24 0349     Fentanyl, Urine Negative    Narrative:      Negative Threshold:      Fentanyl 5 ng/mL     The normal value for the drug tested is negative. This report includes final unconfirmed screening results to be used for medical treatment purposes only. Unconfirmed results must not be used for non-medical purposes such as employment or legal testing. Clinical consideration should be applied to any drug of abuse test, particularly when unconfirmed results are used.           Urine Drug Screen - Urine, Clean Catch  [[id number]]  (Abnormal) Collected: 10/07/24 0254    Specimen: Urine, Clean Catch Updated: 10/07/24 0323     THC, Screen, Urine Negative     Phencyclidine (PCP), Urine Negative     Cocaine Screen, Urine Negative     Methamphetamine, Ur Negative     Opiate Screen Negative     Amphetamine Screen, Urine Negative     Benzodiazepine Screen, Urine Positive     Tricyclic Antidepressants Screen Negative     Methadone Screen, Urine Negative     Barbiturates Screen, Urine Negative     Oxycodone Screen, Urine Negative     Buprenorphine, Screen, Urine Negative    Narrative:      Cutoff For Drugs Screened:    Amphetamines               500 ng/ml  Barbiturates               200 ng/ml  Benzodiazepines            150 ng/ml  Cocaine                    150 ng/ml  Methadone                  200 ng/ml  Opiates                    100 ng/ml  Phencyclidine               25 ng/ml  THC                         50 ng/ml  Methamphetamine            500 ng/ml  Tricyclic Antidepressants  300 ng/ml  Oxycodone                  100 ng/ml  Buprenorphine               10 ng/ml    The normal value for all drugs tested is negative. This report includes unconfirmed screening results, with the cutoff values listed, to be used for medical treatment purposes only.  Unconfirmed results must not be used for non-medical purposes such as employment or legal testing.  Clinical consideration should be applied to any drug of abuse test, particularly when unconfirmed results are used.      Ethanol [[id number]]  (Normal) Collected: 10/06/24 1942    Specimen: Blood Updated: 10/07/24 0206     Ethanol <10 mg/dL     Narrative:      Elevated lactic acid concentration and lactate dehydrogenase(LD) activity may falsely elevate enzymatically determined ethanol levels. Not for legal purposes.     TSH Rfx On Abnormal To Free T4 [[id number]]  (Normal) Collected: 10/06/24 1942    Specimen: Blood Updated: 10/07/24 0200     TSH 1.480 uIU/mL     STAT Lactic Acid, Reflex [[id number]]   (Normal) Collected: 10/06/24 2253    Specimen: Blood Updated: 10/06/24 2321     Lactate 1.1 mmol/L      Comment: Falsely depressed results may occur on samples drawn from patients receiving N-Acetylcysteine (NAC) or Metamizole.       Urinalysis With Microscopic If Indicated (No Culture) - Urine, Catheter [[id number]]  (Abnormal) Collected: 10/06/24 2044    Specimen: Urine, Catheter Updated: 10/06/24 2059     Color, UA Yellow     Appearance, UA Clear     pH, UA 5.5     Specific Gravity, UA 1.033     Glucose, UA Negative     Ketones, UA Trace     Bilirubin, UA Negative     Blood, UA Negative     Protein, UA 30 mg/dL (1+)     Leuk Esterase, UA Negative     Nitrite, UA Negative     Urobilinogen, UA 0.2 E.U./dL    Urinalysis, Microscopic Only - Urine, Catheter [[id number]]  (Abnormal) Collected: 10/06/24 2044    Specimen: Urine, Catheter Updated: 10/06/24 2059     RBC, UA 0-2 /HPF      WBC, UA 3-5 /HPF      Bacteria, UA None Seen /HPF      Squamous Epithelial Cells, UA 0-2 /HPF      Hyaline Casts, UA 0-6 /LPF      Methodology Automated Microscopy    Comprehensive Metabolic Panel [[id number]]  (Abnormal) Collected: 10/06/24 1942    Specimen: Blood Updated: 10/06/24 2012     Glucose 102 mg/dL      BUN 26 mg/dL      Creatinine 1.71 mg/dL      Sodium 138 mmol/L      Potassium 4.1 mmol/L      Comment: Slight hemolysis detected by analyzer. Result may be falsely elevated.        Chloride 102 mmol/L      CO2 19.0 mmol/L      Calcium 9.9 mg/dL      Total Protein 7.3 g/dL      Albumin 4.6 g/dL      ALT (SGPT) 24 U/L      AST (SGOT) 41 U/L      Alkaline Phosphatase 73 U/L      Total Bilirubin 0.9 mg/dL      Globulin 2.7 gm/dL      Comment: Calculated Result        A/G Ratio 1.7 g/dL      BUN/Creatinine Ratio 15.2     Anion Gap 17.0 mmol/L      eGFR 29.2 mL/min/1.73     Narrative:      GFR Normal >60  Chronic Kidney Disease <60  Kidney Failure <15    The GFR formula is only valid for adults with stable renal function between ages 18  and 70.    BNP [[id number]]  (Normal) Collected: 10/06/24 1942    Specimen: Blood Updated: 10/06/24 2012     proBNP 195.9 pg/mL     Narrative:      This assay is used as an aid in the diagnosis of individuals suspected of having heart failure. It can be used as an aid in the diagnosis of acute decompensated heart failure (ADHF) in patients presenting with signs and symptoms of ADHF to the emergency department (ED). In addition, NT-proBNP of <300 pg/mL indicates ADHF is not likely.    Age Range Result Interpretation  NT-proBNP Concentration (pg/mL:      <50             Positive            >450                   Gray                 300-450                    Negative             <300    50-75           Positive            >900                  Gray                300-900                  Negative            <300      >75             Positive            >1800                  Gray                300-1800                  Negative            <300    Single High Sensitivity Troponin T [[id number]]  (Abnormal) Collected: 10/06/24 1942    Specimen: Blood Updated: 10/06/24 2012     HS Troponin T 19 ng/L     Narrative:      High Sensitive Troponin T Reference Range:  <14.0 ng/L- Negative Female for AMI  <22.0 ng/L- Negative Male for AMI  >=14 - Abnormal Female indicating possible myocardial injury.  >=22 - Abnormal Male indicating possible myocardial injury.   Clinicians would have to utilize clinical acumen, EKG, Troponin, and serial changes to determine if it is an Acute Myocardial Infarction or myocardial injury due to an underlying chronic condition.         Lactic Acid, Plasma [[id number]]  (Abnormal) Collected: 10/06/24 1942    Specimen: Blood Updated: 10/06/24 2008     Lactate 3.5 mmol/L      Comment: Falsely depressed results may occur on samples drawn from patients receiving N-Acetylcysteine (NAC) or Metamizole.       Blood Culture - Blood, Arm, Right [[id number]] Collected: 10/06/24 1943    Specimen: Blood from Arm,  Right Updated: 10/06/24 1954    Blood Culture - Blood, Arm, Left [[id number]] Collected: 10/1940    Specimen: Blood from Arm, Left Updated: 10/06/24 1954    CBC & Differential [[id number]]  (Abnormal) Collected: 10/06/24 1942    Specimen: Blood Updated: 10/06/24 1953    Narrative:      The following orders were created for panel order CBC & Differential.  Procedure                               Abnormality         Status                     ---------                               -----------         ------                     CBC Auto Differential[[id number]]        Abnormal            Final result                 Please view results for these tests on the individual orders.    CBC Auto Differential [[id number]]  (Abnormal) Collected: 10/06/24 1942    Specimen: Blood Updated: 10/06/24 1953     WBC 9.24 10*3/mm3      RBC 5.21 10*6/mm3      Hemoglobin 14.4 g/dL      Hematocrit 45.0 %      MCV 86.4 fL      MCH 27.6 pg      MCHC 32.0 g/dL      RDW 14.2 %      RDW-SD 44.8 fl      MPV 9.5 fL      Platelets 330 10*3/mm3      Neutrophil % 79.6 %      Lymphocyte % 10.1 %      Monocyte % 7.1 %      Eosinophil % 1.8 %      Basophil % 1.1 %      Immature Grans % 0.3 %      Neutrophils, Absolute 7.35 10*3/mm3      Lymphocytes, Absolute 0.93 10*3/mm3      Monocytes, Absolute 0.66 10*3/mm3      Eosinophils, Absolute 0.17 10*3/mm3      Basophils, Absolute 0.10 10*3/mm3      Immature Grans, Absolute 0.03 10*3/mm3      nRBC 0.0 /100 WBC             Rads:  Imaging Results (Last 72 Hours)       Procedure Component Value Units Date/Time    XR Chest 1 View [[id number]] Collected: 10/06/24 2249     Updated: 10/06/24 2253    Narrative:      XR CHEST 1 VW    Date of Exam: 10/6/2024 9:30 PM EDT    Indication: Fever, weakness, hypoxia    Comparison: 9/28/2024.    Findings:  There are no airspace consolidations. No pleural fluid. No pneumothorax. The pulmonary vasculature appears within normal limits. The cardiac and mediastinal silhouette  appear unremarkable. No acute osseous abnormality identified.      Impression:      Impression:  No acute cardiopulmonary process.      Electronically Signed: Siria Mariee MD    10/6/2024 10:50 PM EDT    Workstation ID: [id number]    CT Head Without Contrast [[id number]] Collected: 10/06/24 2117     Updated: 10/06/24 2121    Narrative:      CT HEAD WO CONTRAST    Date of Exam: 10/6/2024 9:00 PM EDT    Indication: ams.    Comparison: 9/20/2024.    Technique: Axial CT images were obtained of the head without contrast administration.  Automated exposure control and iterative construction methods were used.      Findings:  There is no evidence of hemorrhage. There is no mass effect or midline shift.    There is no extracerebral collection.    Ventricles are normal in size and configuration for patient's stated age.      Posterior fossa is within normal limits.    Calvarium and skull base appear intact.   Visualized sinuses show no air fluid levels. Visualized orbits are unremarkable.      Impression:      Impression:  No acute intracranial process.        Electronically Signed: Siria Mariee MD    10/6/2024 9:18 PM EDT    Workstation ID: [id number]              Assessment: Ongoing cognitive decline, likely Alzheimer's disease with acute worsening    Positive drug screen.    Wandering.    Essential tremor     Plan:    Discontinue Wellbutrin.    Add primidone 25 mg twice daily for tremor and anxiety.    Add Exelon patch 4.6 mg daily.    Add melatonin 5 mg nightly.    Seroquel 12.5 mg nightly and titrate as needed    Consider Depakote if further medication is needed to stabilize patient    Memory care unit most likely destination    Comment:   Is not quite clear where the patient got the benzos although she did have a prescription for Ambien which had been filled [place] recently as August 3 for 30 pills and had been taken for a long period of time    I discussed the patients findings and my recommendations with patient, family,  and primary care team      William Rosenberg MD  10/07/24  14:49 EDT

## 2024-10-07 NOTE — H&P
[facility] Medicine Services  HISTORY AND PHYSICAL    Patient Name: Marycruz Funez  : 1940  MRN: [id number]  Primary Care Physician: Frandy Kuhn MD  Date of admission: 10/6/2024      Subjective   Subjective     Chief Complaint:  Altered mental status    HPI:  Marycruz Funez is a 84 y.o. female with past medical history of unspecified dementia, chronic tremor, CKD 4, dysphagia who is presenting with persistent changes in her mental status.  At her baseline, she lives alone and over the past several years has had minor changes in her short-term memory, she has also had a persistent tremor that was worsening.  Acutely over the last week she has been having hallucinations, seeing people and things that are not there.  She was also found to be wandering in her neighborhood once over the last week.  1 month ago she was pulled over for driving in her car erratically and her home zolpidem was stopped.  She was seen in the ED  for this and treated for a UTI with a cephalosporin and then Bactrim but has not improved.  She has had no other significant changes in her medications or other symptoms beyond the above.  Family is concerned that she can no longer take care of herself at home and may need placement.  She does not drink alcohol to the best of family's knowledge and they did not report any in the house.  She did seem to neurologist in  for dementia evaluation but had declined Aricept at the time.      Personal History     History reviewed. No pertinent past medical history.        Past Surgical History:   Procedure Laterality Date    BREAST CYST ASPIRATION Left     HYSTERECTOMY         Family History: family history is not on file.     Social History:  reports that she has never smoked. She has never used smokeless tobacco. She reports that she does not drink alcohol and does not use drugs.  Social History     Social History Narrative    Not on file        Medications:  Available home medication information reviewed.       No Known Allergies    Objective   Objective     Vital Signs:   Temp:  [98.6 °F (37 °C)] 98.6 °F (37 °C)  Heart Rate:  [] 73  Resp:  [18] 18  BP: (108-138)/() 127/64       Physical Exam   She is awake and alert, oriented to self and year but not place  Speech is overall clear but she does not have her dentures in  She does occasionally talk to someone who is not in the room  States her daughter and son in the room are her daughter and son-in-law  Concentration appears to be intact, can name days of the week backwards  Can name 2 out of 3 objects correctly: Pen, cell phone, but calls stethoscope a pen as well  Extract movements intact, pupils equal and reactive to light  Facial expression intact  Tongue at midline  Neck flexion intact  No cervical adenopathy or thyromegaly  Heart regular rate and rhythm  Lungs are clear to auscultation bilaterally  Abdomen nondistended, nontender  There is resting tremor present that is significant in the bilateral upper and lower extremities as well as jaw movements, is apparent on extension as well.  No focal weakness or sensory loss appreciated.    Result Review:  I have personally reviewed the results from the time of this admission to 10/7/2024 01:45 EDT and agree with these findings:  [x]  Laboratory list / accordion  [x]  Microbiology  [x]  Radiology  [x]  EKG/Telemetry   []  Cardiology/Vascular   []  Pathology  [x]  Old records  []  Other:  Most notable findings include: See assessment and plan      LAB RESULTS:      Lab 10/06/24  2253 10/06/24  1942   WBC  --  9.24   HEMOGLOBIN  --  14.4   HEMATOCRIT  --  45.0   PLATELETS  --  330   NEUTROS ABS  --  7.35*   IMMATURE GRANS (ABS)  --  0.03   LYMPHS ABS  --  0.93   MONOS ABS  --  0.66   EOS ABS  --  0.17   MCV  --  86.4   LACTATE 1.1 3.5*         Lab 10/06/24  1942   SODIUM 138   POTASSIUM 4.1   CHLORIDE 102   CO2 19.0*   ANION GAP 17.0*   BUN  26*   CREATININE 1.71*   EGFR 29.2*   GLUCOSE 102*   CALCIUM 9.9         Lab 10/06/24  1942   TOTAL PROTEIN 7.3   ALBUMIN 4.6   GLOBULIN 2.7   ALT (SGPT) 24   AST (SGOT) 41*   BILIRUBIN 0.9   ALK PHOS 73         Lab 10/06/24  1942   PROBNP 195.9   HSTROP T 19*                 UA          9/28/2024    20:26 10/6/2024    20:44   Urinalysis   Squamous Epithelial Cells, UA 3-6  0-2    Specific Gravity, UA 1.029  1.033    Ketones, UA 15 mg/dL (1+)  Trace    Blood, UA Trace  Negative    Leukocytes, UA Moderate (2+)  Negative    Nitrite, UA Negative  Negative    RBC, UA 3-5  0-2    WBC, UA 6-10  3-5    Bacteria, UA 4+  None Seen        Microbiology Results (last 10 days)       Procedure Component Value - Date/Time    Urine Culture - Urine, Urine, Clean Catch [[id number]]  (Abnormal)  (Susceptibility) Collected: 09/28/24 2026    Lab Status: Final result Specimen: Urine, Clean Catch Updated: 10/01/24 1018     Urine Culture >100,000 CFU/mL Escherichia coli    Narrative:      Colonization of the urinary tract without infection is common. Treatment is discouraged unless the patient is symptomatic, pregnant, or undergoing an invasive urologic procedure.    Susceptibility        Escherichia coli      ZACKARY      Amikacin Susceptible      Amoxicillin + Clavulanate Susceptible      Ampicillin Resistant      Ampicillin + Sulbactam Intermediate      Cefazolin Susceptible      Cefepime Susceptible      Ceftazidime Susceptible      Ceftriaxone Susceptible      Gentamicin Resistant      Levofloxacin Resistant      Nitrofurantoin Susceptible      Piperacillin + Tazobactam Susceptible      Tobramycin Resistant      Trimethoprim + Sulfamethoxazole Susceptible                                   XR Chest 1 View    Result Date: 10/6/2024  XR CHEST 1 VW Date of Exam: 10/6/2024 9:30 PM EDT Indication: Fever, weakness, hypoxia Comparison: 9/28/2024. Findings: There are no airspace consolidations. No pleural fluid. No pneumothorax. The pulmonary  vasculature appears within normal limits. The cardiac and mediastinal silhouette appear unremarkable. No acute osseous abnormality identified.     Impression: Impression: No acute cardiopulmonary process. Electronically Signed: Siria Mariee MD  10/6/2024 10:50 PM EDT  Workstation ID: [id number]    CT Head Without Contrast    Result Date: 10/6/2024  CT HEAD WO CONTRAST Date of Exam: 10/6/2024 9:00 PM EDT Indication: ams. Comparison: 9/20/2024. Technique: Axial CT images were obtained of the head without contrast administration.  Automated exposure control and iterative construction methods were used. Findings: There is no evidence of hemorrhage. There is no mass effect or midline shift. There is no extracerebral collection. Ventricles are normal in size and configuration for patient's stated age.  Posterior fossa is within normal limits. Calvarium and skull base appear intact.   Visualized sinuses show no air fluid levels. Visualized orbits are unremarkable.     Impression: Impression: No acute intracranial process. Electronically Signed: Siria Mariee MD  10/6/2024 9:18 PM EDT  Workstation ID: [id number]         Assessment & Plan   Assessment & Plan       AMS (altered mental status)    Dysphagia    Gastro-esophageal reflux disease with esophagitis    Sensorineural hearing loss (SNHL) of both ears    Altered mental status manifested as hallucination   History of dementia, unspecified  Tremor  -Prior issues with memory impairment and tremor which has been gradually worsening.  Now present at rest as well.  New hallucinations over the past 1 to 2 weeks.  Inability to safely care for self at home.  Worsening despite treatment for UTI  -Overall unrevealing workup so far from today and 9/28. No acute findings on CTH.   -Recheck TSH, ethanol, UDS. Check B12, vit D  -Check for urinary retention  -Trial thiamine  -Neurology in am. There is family history of Parkinsons in son, I wonder if this could be Parkinsons or LBD and if  she'd benefit from trial of sinemet    Acute kidney injury on CKD   Metabolic acidosis, elevated lactic acid  -Renal function currently impaired compared to baseline, history of essentially no reliable oral intake over the last week.  Received IV fluids with normalization of lactic acid.  Will encourage p.o. continue to monitor creatinine, output    Recent UTI  -Was adequately treated, sensitive to the antibiotics given.  Hold further treatment    History of esophageal dysphagia  Adentulous  -Currently following with GI  -Does not currently have her dentures, will place on puréed diet until these can be obtained.  SLP thor    Will ask pharmacy for assistance with med rec. Holding home bupropion, effexor, remeron for now in case interacting. Will hold aricept for now as well as this was most recent med addition.    VTE Prophylaxis:  Pharmacologic VTE prophylaxis orders are present.          CODE STATUS:    Code Status and Medical Interventions: CPR (Attempt to Resuscitate); Full Support   Ordered at: 10/07/24 0138     Level Of Support Discussed With:    Health Care Surrogate     Code Status (Patient has no pulse and is not breathing):    CPR (Attempt to Resuscitate)     Medical Interventions (Patient has pulse or is breathing):    Full Support       Expected Discharge   Expected discharge date/ time has not been documented.     Roque Reese MD  10/07/24

## 2024-10-07 NOTE — PLAN OF CARE
Goal Outcome Evaluation:  Plan of Care Reviewed With: patient, son        Progress: no change  Outcome Evaluation: PT initial eval completed. Pt presents with confusion, hallucinations, generalized weakness, coordination deficits, and impaired balance. Pt required consistent redirection to task and cues  for safety awareness. Min A provided for side-steps to HOB. Further activity limited by weakness and fatigue. IPPT is warrented for addressing deficits. PT rec d/c to IPR for best functional outcomes. Rec OT eval for addressing ADL needs.      Anticipated Discharge Disposition (PT): inpatient rehabilitation facility

## 2024-10-07 NOTE — THERAPY EVALUATION
Patient Name: Marycruz Funez  : 1940    MRN: [id number]                              Today's Date: 10/7/2024       Admit Date: 10/6/2024    Visit Dx:     ICD-10-CM ICD-9-CM   1. Altered mental status, unspecified altered mental status type  R41.82 780.97   2. Dehydration  E86.0 276.51   3. Mild renal insufficiency  N28.9 593.9   4. Recent urinary tract infection  Z87.440 V13.02   5. Dysphagia, unspecified type  R13.10 787.20     Patient Active Problem List   Diagnosis    AMS (altered mental status)    Dysphagia    Gastro-esophageal reflux disease with esophagitis    Sensorineural hearing loss (SNHL) of both ears     History reviewed. No pertinent past medical history.  Past Surgical History:   Procedure Laterality Date    BREAST CYST ASPIRATION Left     HYSTERECTOMY        General Information       Row Name 10/07/24 1342          Physical Therapy Time and Intention    Document Type evaluation  -AB     Mode of Treatment physical therapy  -AB       Row Name 10/07/24 1342          General Information    Patient Profile Reviewed yes  -AB     Prior Level of Function independent:;all household mobility;community mobility;gait;transfer;bed mobility;ADL's;driving;home management  Pt reports using cane and rollator PRN. Hx of falls per son.  -AB     Existing Precautions/Restrictions fall;other (see comments)   confusion, active hallucinations, tremor  -AB     Barriers to Rehab medically complex;cognitive status  -AB       Row Name 10/07/24 1342          Living Environment    People in Home alone  -AB       Row Name 10/07/24 1342          Home Main Entrance    Number of Stairs, Main Entrance none  -AB       Row Name 10/07/24 1342          Stairs Within Home, Primary    Number of Stairs, Within Home, Primary none  -AB       Row Name 10/07/24 1342          Cognition    Orientation Status (Cognition) oriented to;person;disoriented to;place;situation;time  -AB       Row Name 10/07/24 1342          Safety Issues,  Functional Mobility    Safety Issues Affecting Function (Mobility) awareness of need for assistance;impulsivity;insight into deficits/self-awareness;sequencing abilities;safety precautions follow-through/compliance;safety precaution awareness;problem-solving;judgment  -AB     Impairments Affecting Function (Mobility) balance;cognition;endurance/activity tolerance;pain;coordination;strength;postural/trunk control  -AB     Cognitive Impairments, Mobility Safety/Performance attention;awareness, need for assistance;insight into deficits/self-awareness;impulsivity;judgment;problem-solving/reasoning;sequencing abilities;safety precaution follow-through;safety precaution awareness  -AB     Comment, Safety Issues/Impairments (Mobility) Pt following majority of commands with frequent re-direction to task. Active hallucinations during session, RN aware.  -AB               User Key  (r) = Recorded By, (t) = Taken By, (c) = Cosigned By      Initials Name Provider Type    AB Nyasia Herrera, PT Physical Therapist                   Mobility       Row Name 10/07/24 3956          Bed Mobility    Bed Mobility supine-sit;sit-supine;scooting/bridging  -AB     Scooting/Bridging [place] (Bed Mobility) minimum assist (75% patient effort);verbal cues  -AB     Supine-Sit [place] (Bed Mobility) contact guard;1 person assist;verbal cues  -AB     Sit-Supine [place] (Bed Mobility) minimum assist (75% patient effort);1 person assist;verbal cues  -AB     Assistive Device (Bed Mobility) head of bed elevated;bed rails  -AB     Comment, (Bed Mobility) Good use of UE's on railing in order to sit EOB. Required assist with BLE on return to supine.  -AB       Row Name 10/07/24 2787          Transfers    Comment, (Transfers) Cues for hand placement and sequencing. Pt easily distracted by hallucinations and required consistent re-direction to task.  -AB       Row Name 10/07/24 2747          Bed-Chair Transfer    Comment, (Bed-Chair  Transfer) Deferred d/t safety concerns.  -AB       Row Name 10/07/24 1345          Sit-Stand Transfer    Sit-Stand [place] (Transfers) minimum assist (75% patient effort);2 person assist;verbal cues;nonverbal cues (demo/gesture)  -AB     Assistive Device (Sit-Stand Transfers) walker, front-wheeled  -AB     Comment, (Sit-Stand Transfer) Posterior lean in standing  -AB       Row Name 10/07/24 1345          Gait/Stairs (Locomotion)    [place] Level (Gait) minimum assist (75% patient effort);2 person assist;verbal cues;nonverbal cues (demo/gesture)  -AB     Assistive Device (Gait) walker, front-wheeled  -AB     Patient was able to Ambulate yes  -AB     Distance in Feet (Gait) 4  -AB     Deviations/Abnormal Patterns (Gait) bilateral deviations;base of support, narrow;stride length decreased;festinating/shuffling  -AB     Bilateral Gait Deviations forward flexed posture;heel strike decreased  -AB     [place] Level (Stairs) not tested  -AB     Comment, (Gait/Stairs) Pt took short, shuffling steps to HOB with min A for improved stability. Cues provided for walker positioning and forward gaze. Minimal improvement noted with cues. Pt reports significant fatigue and returned to sitting. No overt LOB.  -AB               User Key  (r) = Recorded By, (t) = Taken By, (c) = Cosigned By      Initials Name Provider Type    AB Nyasia Herrera, PT Physical Therapist                   Obj/Interventions       Row Name 10/07/24 1348          Range of Motion Comprehensive    General Range of Motion bilateral lower extremity ROM WFL  -AB       Row Name 10/07/24 1348          Strength Comprehensive (MMT)    General Manual Muscle Testing (MMT) Assessment lower extremity strength deficits identified  -AB     Comment, General Manual Muscle Testing (MMT) Assessment BLE grossly 3+/5 as observed during functional  mobility. Pt unable to follow commands for formal MMT  -AB       Row Name 10/07/24 1348          Motor Skills    Motor  Skills coordination  -AB     Coordination bilateral;lower extremity;moderate impairment  -AB       Row Name 10/07/24 1348          Balance    Balance Assessment sitting static balance;sitting dynamic balance;standing static balance;standing dynamic balance  -AB     Static Sitting Balance contact guard  -AB     Dynamic Sitting Balance minimal assist  -AB     Position, Sitting Balance unsupported;sitting edge of bed  -AB     Static Standing Balance minimal assist  -AB     Dynamic Standing Balance minimal assist;2-person assist;verbal cues;non-verbal cues (demo/gesture)  -AB     Position/Device Used, Standing Balance supported;walker, front-wheeled  -AB     Balance Interventions sitting;standing;sit to stand;supported;static;dynamic;occupation based/functional task  -AB     Comment, Balance Min A to steady  -AB       Row Name 10/07/24 1348          Sensory Assessment (Somatosensory)    Sensory Assessment (Somatosensory) LE sensation intact  -AB               User Key  (r) = Recorded By, (t) = Taken By, (c) = Cosigned By      Initials Name Provider Type    AB Nyasia Herrera, PT Physical Therapist                   Goals/Plan       Row Name 10/07/24 1423          Bed Mobility Goal 1 (PT)    Activity/Assistive Device (Bed Mobility Goal 1, PT) sit to supine;supine to sit  -AB     [place] Level/Cues Needed (Bed Mobility Goal 1, PT) supervision required  -AB     Time Frame (Bed Mobility Goal 1, PT) short term goal (STG);5 days  -AB       Row Name 10/07/24 1423          Transfer Goal 1 (PT)    Activity/Assistive Device (Transfer Goal 1, PT) sit-to-stand/stand-to-sit;bed-to-chair/chair-to-bed;walker, rolling  -AB     [place] Level/Cues Needed (Transfer Goal 1, PT) contact guard required  -AB     Time Frame (Transfer Goal 1, PT) long term goal (LTG);10 days  -AB       Row Name 10/07/24 1423          Gait Training Goal 1 (PT)    Activity/Assistive Device (Gait Training Goal 1, PT) gait (walking locomotion);assistive  device use;walker, rolling  -AB     [place] Level (Gait Training Goal 1, PT) contact guard required  -AB     Distance (Gait Training Goal 1, PT) 100  -AB     Time Frame (Gait Training Goal 1, PT) long term goal (LTG);10 days  -AB       Row Name 10/07/24 1423          Therapy Assessment/Plan (PT)    Planned Therapy Interventions (PT) balance training;bed mobility training;gait training;home exercise program;patient/family education;postural re-education;transfer training;stretching;strengthening;ROM (range of motion)  -AB               User Key  (r) = Recorded By, (t) = Taken By, (c) = Cosigned By      Initials Name Provider Type    AB Nyasia Herrera, PT Physical Therapist                   Clinical Impression       Row Name 10/07/24 1350          Pain    Pretreatment Pain Rating 0/10 - no pain  -AB     Posttreatment Pain Rating 0/10 - no pain  -AB       Row Name 10/07/24 1350          Pain Scale: FACES Pre/Post-Treatment    Pain: FACES Scale, Pretreatment 0-->no hurt  -AB     Posttreatment Pain Rating 0-->no hurt  -AB       Row Name 10/07/24 1350          Plan of Care Review    Plan of Care Reviewed With patient;son  -AB     Progress no change  -AB     Outcome Evaluation PT initial eval completed. Pt presents with confusion, hallucinations, generalized weakness, coordination deficits, and impaired balance. Pt required consistent redirection to task and cues  for safety awareness. Min A provided for side-steps to HOB. Further activity limited by weakness and fatigue. IPPT is warrented for addressing deficits. PT rec d/c to IPR for best functional outcomes. Rec OT eval for addressing ADL needs.  -AB       Row Name 10/07/24 2826          Therapy Assessment/Plan (PT)    Patient/Family Therapy Goals Statement (PT) did not state  -AB     Rehab Potential (PT) fair, will monitor progress closely  -AB     Criteria for Skilled Interventions Met (PT) yes;meets criteria;skilled treatment is necessary  -AB     Therapy  Frequency (PT) daily  -AB       Row Name 10/07/24 1350          Vital Signs    Pre Systolic BP Rehab 136  -AB     Pre Treatment Diastolic BP 73  -AB     Pretreatment Heart Rate (beats/min) 91  -AB     Pre SpO2 (%) 97  -AB     O2 Delivery Pre Treatment room air  -AB     O2 Delivery Intra Treatment room air  -AB     O2 Delivery Post Treatment room air  -AB     Pre Patient Position Supine  -AB     Intra Patient Position Standing  -AB     Post Patient Position Supine  -AB       Row Name 10/07/24 1350          Positioning and Restraints    Pre-Treatment Position in bed  -AB     Post Treatment Position bed  -AB     In Bed notified nsg;supine;call light within reach;encouraged to call for assist;exit alarm on;with family/caregiver;side rails up x2  -AB               User Key  (r) = Recorded By, (t) = Taken By, (c) = Cosigned By      Initials Name Provider Type    Nyasia Carlson, PT Physical Therapist                   Outcome Measures       Row Name 10/07/24 1424 10/07/24 0800       How much help from another person do you currently need...    Turning from your back to your side while in flat bed without using bedrails? 3  -AB 3  -SM    Moving from lying on back to sitting on the side of a flat bed without bedrails? 3  -AB 3  -SM    Moving to and from a bed to a chair (including a wheelchair)? 3  -AB 3  -SM    Standing up from a chair using your arms (e.g., wheelchair, bedside chair)? 3  -AB 2  -SM    Climbing 3-5 steps with a railing? 1  -AB 2  -SM    To walk in hospital room? 2  -AB 2  -SM    AM-PAC 6 Clicks Score (PT) 15  -AB 15  -SM    Highest Level of Mobility Goal 4 --> Transfer to chair/commode  -AB 4 --> Transfer to chair/commode  -SM      Row Name 10/07/24 1424          Functional Assessment    Outcome Measure Options AM-PAC 6 Clicks Basic Mobility (PT)  -AB               User Key  (r) = Recorded By, (t) = Taken By, (c) = Cosigned By      Initials Name Provider Type    Nyasia Carlson, PT Physical Therapist     Mary Kirkpatrick, RN Registered Nurse                                 Physical Therapy Education       Title: PT OT SLP Therapies (In Progress)       Topic: Physical Therapy (In Progress)       Point: Mobility training (In Progress)       Learning Progress Summary             Patient Acceptance, E,D, NR by AB at 10/7/2024 1424                         Point: Home exercise program (Not Started)       Learner Progress:  Not documented in this visit.              Point: Body mechanics (In Progress)       Learning Progress Summary             Patient Acceptance, E,D, NR by AB at 10/7/2024 1424                         Point: Precautions (In Progress)       Learning Progress Summary             Patient Acceptance, E,D, NR by AB at 10/7/2024 1424                                         User Key       Initials Effective Dates Name Provider Type Discipline    AB 09/22/22 -  Nyasia Herrera, PT Physical Therapist PT                  PT Recommendation and Plan  Planned Therapy Interventions (PT): balance training, bed mobility training, gait training, home exercise program, patient/family education, postural re-education, transfer training, stretching, strengthening, ROM (range of motion)  Plan of Care Reviewed With: patient, son  Progress: no change  Outcome Evaluation: PT initial eval completed. Pt presents with confusion, hallucinations, generalized weakness, coordination deficits, and impaired balance. Pt required consistent redirection to task and cues  for safety awareness. Min A provided for side-steps to HOB. Further activity limited by weakness and fatigue. IPPT is warrented for addressing deficits. PT rec d/c to IPR for best functional outcomes. Rec OT eval for addressing ADL needs.     Time Calculation:   PT Evaluation Complexity  History, PT Evaluation Complexity: 3 or more personal factors and/or comorbidities  Examination of Body Systems (PT Eval Complexity): total of 4 or more elements  Clinical  Presentation (PT Evaluation Complexity): evolving  Clinical Decision Making (PT Evaluation Complexity): moderate complexity  Overall Complexity (PT Evaluation Complexity): moderate complexity     PT Charges       Row Name 10/07/24 1425             Time Calculation    Start Time 1312  -AB      PT Received On 10/07/24  -AB      PT Goal Re-Cert Due Date 10/17/24  -AB         Untimed Charges    PT Eval/Re-eval Minutes 50  -AB         Total Minutes    Untimed Charges Total Minutes 50  -AB       Total Minutes 50  -AB                User Key  (r) = Recorded By, (t) = Taken By, (c) = Cosigned By      Initials Name Provider Type    AB Nyasia Herrera, PT Physical Therapist                  Therapy Charges for Today       Code Description Service Date Service Provider Modifiers Qty    81851377528 HC PT EVAL MOD COMPLEXITY 4 10/7/2024 Nyasia Herrera, PT GP 1    27432327312 HC PT THER SUPP EA 15 MIN 10/7/2024 Nyasia Herrera, PT GP 2            PT G-Codes  Outcome Measure Options: AM-PAC 6 Clicks Basic Mobility (PT)  AM-PAC 6 Clicks Score (PT): 15  PT Discharge Summary  Anticipated Discharge Disposition (PT): inpatient rehabilitation facility    Nyasia Herrera PT  10/7/2024

## 2024-10-07 NOTE — THERAPY DISCHARGE NOTE
Acute Care - Speech Language Pathology   Swallow Initial Evaluation/Discharge [facility]    Clinical Swallow Evaluation       Patient Name: Marycruz Funez  : 1940  MRN: [id number]  Today's Date: 10/7/2024               Admit Date: 10/6/2024    Visit Dx:    ICD-10-CM ICD-9-CM   1. Altered mental status, unspecified altered mental status type  R41.82 780.97   2. Dehydration  E86.0 276.51   3. Mild renal insufficiency  N28.9 593.9   4. Recent urinary tract infection  Z87.440 V13.02   5. Dysphagia, unspecified type  R13.10 787.20     Patient Active Problem List   Diagnosis    AMS (altered mental status)    Dysphagia    Gastro-esophageal reflux disease with esophagitis    Sensorineural hearing loss (SNHL) of both ears     History reviewed. No pertinent past medical history.  Past Surgical History:   Procedure Laterality Date    BREAST CYST ASPIRATION Left     HYSTERECTOMY         SLP Recommendation and Plan  SLP Swallowing Diagnosis: swallow WFL/no suspected pharyngeal impairment, suspected esophageal dysphagia (10/07/24 1000)  SLP Diet Recommendation: thin liquids, puree, other (see comments) (May advance to soft chopped solids per patient request/comfort.) (10/07/24 1000)     Monitor for Signs of Aspiration: notify SLP if any concerns (10/07/24 1000)  Recommended Diagnostics: No further SLP services recommended (10/07/24 1000)  Swallow Criteria for Skilled Therapeutic Interventions Met: no problems identified which require skilled intervention, other (see comments) (GI consult for esophageal concerns) (10/07/24 1000)  Anticipated Discharge Disposition (SLP): No further SLP services warranted (10/07/24 1000)     Therapy Frequency (Swallow): evaluation only (10/07/24 1000)     Demonstrates Need for Referral to Another Service: gastroenterology, dedicated esophageal assessment (10/07/24 1000)        Anticipated Discharge Disposition (SLP): No further SLP services warranted (10/07/24 1000)      Demonstrates Need for Referral to Another Service: gastroenterology, dedicated esophageal assessment (10/07/24 1000)                           SWALLOW EVALUATION (Last 72 Hours)       SLP Adult Swallow Evaluation       Row Name 10/07/24 1000                   Rehab Evaluation    Document Type discharge evaluation/summary  -        Subjective Information no complaints  -        Patient Observations alert;cooperative;agree to therapy  -        Patient/Family/Caregiver Comments/Observations none present  -           General Information    Patient Profile Reviewed yes  -        Plans/Goals Discussed with patient;agreed upon  -        Barriers to Rehab none identified  -           Pain    Additional Documentation Pain Scale: FACES Pre/Post-Treatment (Group)  -           Pain Scale: FACES Pre/Post-Treatment    Pain: FACES Scale, Pretreatment 0-->no hurt  -        Posttreatment Pain Rating 0-->no hurt  -           Oral Motor Structure and Function    Dentition Assessment edentulous;edentulous, dentures not available  -        Secretion Management WNL/WFL  -CH        Mucosal Quality moist, healthy  -        Volitional Swallow WFL  -           Oral Musculature and Cranial Nerve Assessment    Oral Motor General Assessment generalized oral motor weakness  -           General Eating/Swallowing Observations    Respiratory Support Currently in Use room air  -        Eating/Swallowing Skills self-fed;appropriate self-feeding skills observed  -        Positioning During Eating upright 90 degree;upright in bed  -        Utensils Used spoon;cup;straw  -        Consistencies Trialed ice chips;thin liquids;pureed;soft to chew textures  -        Pre SpO2 (%) 96  -CH        Post SpO2 (%) 94  -CH           Respiratory    Respiratory Status WFL;room air  -           Clinical Swallow Eval    Oral Prep Phase impaired  mildly prolonged mastication of cracker d/t edentition. Pt reported difficulty with  meats and some vegetables. She reported chewable foods get stuck in her esophagus, and then she regurgitates.  -CH        Oral Transit WFL  -CH        Oral Residue WFL  -CH        Pharyngeal Phase no overt signs/symptoms of pharyngeal impairment  -        Esophageal Phase suspected esophageal impairment  -        Clinical Swallow Evaluation Summary No overt clinical s/s of aspiration with any consistency or presentation style. Mildly prolonged mastication of cracker d/t edentition and missing dentures. Pt reported food sticking in her esophagus and regurgitation when this occurs. Pt. prefers to remain on pureed diet as she stated that pureed foods do not get stuck. Recommend GI consult to address esophageal concerns.  -           Oral Prep Concerns    Oral Prep Concerns increased prep time;other (see comments)  soft solid trials  -        Increased Prep Time mechanical soft  -           Esophageal Phase Concerns    Esophageal Phase Concerns sensation of material sticking  -        Sensation of Material Sticking regular consistencies;mechanical soft;other (see comments)  mid sternal area  -        Esophageal Phase Concerns, Comment Pt reported food sticking in her esophagus and regurgitation when this occurs  -           Swallowing Quality of Life Assessment    Distress/discomfort noted with  mechanical ground textures;regular consistencies  -        Education and counseling provided Safest diet options;Oral care recommendations and rationale  -           SLP Evaluation Clinical Impression    SLP Swallowing Diagnosis swallow WFL/no suspected pharyngeal impairment;suspected esophageal dysphagia  -        Functional Impact no impact on function  -        Swallow Criteria for Skilled Therapeutic Interventions Met no problems identified which require skilled intervention;other (see comments)  GI consult for esophageal concerns  -           Recommendations    Therapy Frequency (Swallow)  evaluation only  -        SLP Diet Recommendation thin liquids;puree;other (see comments)  May advance to soft chopped solids per patient request/comfort.  -        Recommended Diagnostics No further SLP services recommended  -        Recommended Precautions and Strategies reflux precautions  -        Oral Care Recommendations Oral Care BID/PRN;Toothbrush  -        SLP Rec. for Method of Medication Administration as tolerated  -        Monitor for Signs of Aspiration notify SLP if any concerns  -        Anticipated Discharge Disposition (SLP) No further SLP services warranted  -        Demonstrates Need for Referral to Another Service gastroenterology;dedicated esophageal assessment  -                  User Key  (r) = Recorded By, (t) = Taken By, (c) = Cosigned By      Initials Name Effective Dates    Ginette Khan MS CCC-SLP 06/16/21 -                     EDUCATION  The patient has been educated in the following areas:   Dysphagia (Swallowing Impairment) Oral Care/Hydration Modified Diet Instruction.                   Time Calculation:    Time Calculation- SLP       Row Name 10/07/24 1104             Time Calculation- [facility]    SLP Start Time 1000  -CH      SLP Received On 10/07/24  -         Untimed Charges    SLP Eval/Re-eval  ST Eval Oral Pharyng Swallow - 51397  -CH      [id number] Eval Oral Pharyng Swallow Minutes 53  -CH         Total Minutes    Untimed Charges Total Minutes 53  -CH       Total Minutes 53  -CH                User Key  (r) = Recorded By, (t) = Taken By, (c) = Cosigned By      Initials Name Provider Type    Ginette Khan MS CCC-SLP Speech and Language Pathologist                    Therapy Charges for Today       Code Description Service Date Service Provider Modifiers Qty    17875720488 HC ST EVAL ORAL PHARYNG SWALLOW 4 10/7/2024 Ginette Tavarez MS CCC-SLP GN 1                 SLP Discharge Summary  Anticipated Discharge Disposition (SLP): No further SLP  services warranted    Ginette Tavarez MS CCC-SLP  10/7/2024

## 2024-10-07 NOTE — PLAN OF CARE
Goal Outcome Evaluation:  Plan of Care Reviewed With: patient                  Anticipated Discharge Disposition (SLP): No further SLP services warranted          SLP Swallowing Diagnosis: swallow WFL/no suspected pharyngeal impairment, suspected esophageal dysphagia (10/07/24 1000)

## 2024-10-07 NOTE — PROGRESS NOTES
"      [facility] Medicine Services  ADMISSION FOLLOW-UP NOTE          Patient admitted after midnight, H&P by my partner performed earlier on today's date reviewed.  Interim findings, labs, and charting also reviewed.        The [facility] Hospital Problem List has been managed and updated to include any new diagnoses:  Active Hospital Problems    Diagnosis  POA    **AMS (altered mental status) [R41.82]  Yes    Gastro-esophageal reflux disease with esophagitis [K21.00]  Yes    Sensorineural hearing loss (SNHL) of both ears [H90.3]  Yes    Dysphagia [R13.10]  Yes      Resolved Hospital Problems   No resolved problems to display.     In summary, this is a 85 yo female w h/o CKDIIIb, esophageal dysphagia (data deficit), cognitive impairment + tremor who presented w concern for hallucinations, wandering, erratic driving.    Patient was previously living alone, memory impairment assessed 6/2022 as mild. Appears independent and functioning well up until recent    I find patient to have pressured speech at fast pace, wanders off, can answer all questions except location (\"I know the type of building... brick... but can't think of the name of it\"). Does not respond to internal stimuli when I am in room.     Denies h/o psychiatric dz, denies h/o ezra, denies h/o not sleeping for days at a time. Does report insomnia which was on ambien, recently stopped 2/2 above driving incident. Patient does not remember driving incident at all    UDS+ benzo. Not given here. Per PDMP + pharmacy review, not prescribed. Possible benzo withdrawal if taking high dose at home (??).    Only changes to recent medications: bactrim for recent UTI, ambien out in 4-6 weeks. Daughter reports remote benzo use but not in last 12 months that she is aware of    ADDITIONAL PLAN:  - detailed assessment and plan from admission reviewed  - curious to f/u neurology's evaluation. Current speech pattern worrisome to me for med " effect/withdrawal/psychiatric presentation. Think with quick escalation of sx seems less c/w dementia progression given timeline and previous independent state  - EVARISTO on CKDIIIb improved, now eating/drinking    Expected Discharge 1/9 (Discharge date is tentative pending patient's medical condition and is subject to change)  Expected Discharge Date: 10/9/2024; Expected Discharge Time:      India Negro MD  10/07/24  ]

## 2024-10-07 NOTE — CASE MANAGEMENT/SOCIAL WORK
Discharge Planning Assessment  [facility]     Patient Name: Marycruz Funez  MRN: [id number]  Today's Date: 10/7/2024    Admit Date: 10/6/2024    Plan: Home   Discharge Needs Assessment       Row Name 10/07/24 1031       Living Environment    People in Home alone    Current Living Arrangements home    Potentially Unsafe Housing Conditions none    Primary Care Provided by self    Provides Primary Care For no one    Family Caregiver if Needed child(domingo), adult    Quality of Family Relationships unable to assess    Able to Return to Prior Arrangements yes       Transition Planning    Patient/Family Anticipates Transition to home    Patient/Family Anticipated Services at Transition ;rehabilitation services    Transportation Anticipated family or friend will provide       Discharge Needs Assessment    Readmission Within the Last 30 Days no previous admission in last 30 days    Equipment Currently Used at Home walker, rolling;cane, quad    Concerns to be Addressed discharge planning    Anticipated Changes Related to Illness none                   Discharge Plan       Row Name 10/07/24 1032       Plan    Plan Home    Patient/Family in Agreement with Plan yes    Plan Comments Spoke with patient in room to initiate discharge planning.  She lives alone in [facility].  Prior to admission, she was independent with ADL's, using a rolling walker to assist with ambulation.  She also has a quad cane cane at home.  She is not current with home health.  Her PCP is Frandy Kuhn.  She does not have an advanced directive.  Verified that she has [employer] Medicare Replacement.  Ms. Funez has RX coverage and [facility].  Her goal is to return home at discharge.  Will await therapy recommendations to determine proper discharge placement.  CM will continue to follow.    Final Discharge Disposition Code 01 - home or self-care                  Continued Care and Services - Admitted Since 10/6/2024     No active coordination exists for this encounter.       Expected Discharge Date and Time       Expected Discharge Date Expected Discharge Time    Oct 9, 2024            Demographic Summary       Row Name 10/07/24 1030       General Information    Admission Type observation    Arrived From emergency department    Referral Source admission list    Reason for Consult discharge planning    Preferred Language English                   Functional Status       Row Name 10/07/24 1031       Functional Status    Usual Activity Tolerance fair    Current Activity Tolerance fair       Functional Status, IADL    Medications independent    Meal Preparation independent    Housekeeping independent    Laundry independent    Shopping independent                   Psychosocial    No documentation.                  Abuse/Neglect    No documentation.                  Legal    No documentation.                  Substance Abuse    No documentation.                  Patient Forms    No documentation.                     Nicolasa Branham RN

## 2024-10-08 PROBLEM — E43 SEVERE MALNUTRITION: Status: ACTIVE | Noted: 2024-10-08

## 2024-10-08 PROCEDURE — 99232 SBSQ HOSP IP/OBS MODERATE 35: CPT | Performed by: PSYCHIATRY & NEUROLOGY

## 2024-10-08 PROCEDURE — 25010000002 THIAMINE PER 100 MG: Performed by: STUDENT IN AN ORGANIZED HEALTH CARE EDUCATION/TRAINING PROGRAM

## 2024-10-08 PROCEDURE — 99232 SBSQ HOSP IP/OBS MODERATE 35: CPT | Performed by: INTERNAL MEDICINE

## 2024-10-08 PROCEDURE — 25010000002 HEPARIN (PORCINE) PER 1000 UNITS: Performed by: STUDENT IN AN ORGANIZED HEALTH CARE EDUCATION/TRAINING PROGRAM

## 2024-10-08 RX ORDER — QUETIAPINE FUMARATE 25 MG/1
25 TABLET, FILM COATED ORAL NIGHTLY
Status: DISCONTINUED | OUTPATIENT
Start: 2024-10-08 | End: 2024-10-09

## 2024-10-08 RX ADMIN — Medication 10 ML: at 05:20

## 2024-10-08 RX ADMIN — HEPARIN SODIUM 5000 UNITS: 5000 INJECTION INTRAVENOUS; SUBCUTANEOUS at 05:22

## 2024-10-08 RX ADMIN — PRIMIDONE 25 MG: 50 TABLET ORAL at 08:12

## 2024-10-08 RX ADMIN — Medication 10 ML: at 20:33

## 2024-10-08 RX ADMIN — PAROXETINE HYDROCHLORIDE 40 MG: 20 TABLET, FILM COATED ORAL at 08:12

## 2024-10-08 RX ADMIN — PRIMIDONE 25 MG: 50 TABLET ORAL at 20:33

## 2024-10-08 RX ADMIN — Medication 1 TABLET: at 08:12

## 2024-10-08 RX ADMIN — HEPARIN SODIUM 5000 UNITS: 5000 INJECTION INTRAVENOUS; SUBCUTANEOUS at 15:26

## 2024-10-08 RX ADMIN — THIAMINE HYDROCHLORIDE 250 MG: 100 INJECTION, SOLUTION INTRAMUSCULAR; INTRAVENOUS at 23:06

## 2024-10-08 RX ADMIN — HEPARIN SODIUM 5000 UNITS: 5000 INJECTION INTRAVENOUS; SUBCUTANEOUS at 20:32

## 2024-10-08 RX ADMIN — QUETIAPINE FUMARATE 25 MG: 25 TABLET ORAL at 20:32

## 2024-10-08 RX ADMIN — ASPIRIN 81 MG: 81 TABLET, COATED ORAL at 08:12

## 2024-10-08 RX ADMIN — THIAMINE HYDROCHLORIDE 250 MG: 100 INJECTION, SOLUTION INTRAMUSCULAR; INTRAVENOUS at 15:27

## 2024-10-08 RX ADMIN — THIAMINE HYDROCHLORIDE 250 MG: 100 INJECTION, SOLUTION INTRAMUSCULAR; INTRAVENOUS at 05:22

## 2024-10-08 RX ADMIN — ATORVASTATIN CALCIUM 20 MG: 20 TABLET, FILM COATED ORAL at 08:12

## 2024-10-08 RX ADMIN — Medication 5 MG: at 20:41

## 2024-10-08 RX ADMIN — RIVASTIGMINE 1 PATCH: 4.6 PATCH TRANSDERMAL at 20:35

## 2024-10-08 NOTE — SIGNIFICANT NOTE
S  Called by nursing per family request discuss dc planning, poc, etc    O  Pt is calm and cooperative, fam at bs    P  Discussed tentative plan per notes from today,  discussed case management role in process and they would be best served by discussing plan with management team on dayshift.  Pt family request dr mario to call tomorrow with update if they dont get to speak in room

## 2024-10-08 NOTE — PLAN OF CARE
Problem: Adult Inpatient Plan of Care  Goal: Plan of Care Review  Outcome: Progressing  Goal: Patient-Specific Goal (Individualized)  Outcome: Progressing  Goal: Absence of Hospital-Acquired Illness or Injury  Outcome: Progressing  Intervention: Identify and Manage Fall Risk  Recent Flowsheet Documentation  Taken 10/8/2024 0400 by Monet Sams RN  Safety Promotion/Fall Prevention:   activity supervised   assistive device/personal items within reach  Taken 10/8/2024 0000 by Monet Sams RN  Safety Promotion/Fall Prevention: assistive device/personal items within reach  Taken 10/7/2024 2200 by Monet Sams RN  Safety Promotion/Fall Prevention:   assistive device/personal items within reach   safety round/check completed  Intervention: Prevent and Manage VTE (Venous Thromboembolism) Risk  Recent Flowsheet Documentation  Taken 10/8/2024 0400 by Monet Sams RN  Range of Motion: ROM (range of motion) performed  Intervention: Prevent Infection  Recent Flowsheet Documentation  Taken 10/8/2024 0400 by Monet Sams RN  Infection Prevention: environmental surveillance performed  Goal: Optimal Comfort and Wellbeing  Outcome: Progressing  Intervention: Monitor Pain and Promote Comfort  Recent Flowsheet Documentation  Taken 10/8/2024 0400 by Monet Sams RN  Pain Management Interventions: care clustered  Taken 10/7/2024 2000 by Monet Sams RN  Pain Management Interventions:   medication offered but refused   pain management plan reviewed with patient/caregiver  Goal: Readiness for Transition of Care  Outcome: Progressing  Goal: Plan of Care Review  Outcome: Progressing  Goal: Patient-Specific Goal (Individualized)  Outcome: Progressing  Goal: Absence of Hospital-Acquired Illness or Injury  Outcome: Progressing  Intervention: Identify and Manage Fall Risk  Recent Flowsheet Documentation  Taken 10/8/2024 0400 by Monet Sams RN  Safety Promotion/Fall Prevention:   activity supervised   assistive device/personal items within  reach  Taken 10/8/2024 0000 by Monet Sams RN  Safety Promotion/Fall Prevention: assistive device/personal items within reach  Taken 10/7/2024 2200 by Monet Sams RN  Safety Promotion/Fall Prevention:   assistive device/personal items within reach   safety round/check completed  Intervention: Prevent and Manage VTE (Venous Thromboembolism) Risk  Recent Flowsheet Documentation  Taken 10/8/2024 0400 by Monet Sams RN  Range of Motion: ROM (range of motion) performed  Intervention: Prevent Infection  Recent Flowsheet Documentation  Taken 10/8/2024 0400 by Monet Sams RN  Infection Prevention: environmental surveillance performed  Goal: Optimal Comfort and Wellbeing  Outcome: Progressing  Intervention: Monitor Pain and Promote Comfort  Recent Flowsheet Documentation  Taken 10/8/2024 0400 by Monet Sams RN  Pain Management Interventions: care clustered  Taken 10/7/2024 2000 by Monet Sams RN  Pain Management Interventions:   medication offered but refused   pain management plan reviewed with patient/caregiver  Goal: Readiness for Transition of Care  Outcome: Progressing     Problem: Fall Injury Risk  Goal: Absence of Fall and Fall-Related Injury  Outcome: Progressing  Intervention: Promote Injury-Free Environment  Recent Flowsheet Documentation  Taken 10/8/2024 0400 by Monet Sams RN  Safety Promotion/Fall Prevention:   activity supervised   assistive device/personal items within reach  Taken 10/8/2024 0000 by Monet Sams RN  Safety Promotion/Fall Prevention: assistive device/personal items within reach  Taken 10/7/2024 2200 by Monet Sams RN  Safety Promotion/Fall Prevention:   assistive device/personal items within reach   safety round/check completed     Problem: UTI (Urinary Tract Infection)  Goal: Improved Infection Symptoms  Outcome: Progressing     Problem: Confusion Acute  Goal: Optimal Cognitive Function  Outcome: Progressing  Intervention: Minimize Contributing Factors  Recent Flowsheet  Documentation  Taken 10/8/2024 0000 by Monet Sams RN  Sensory Stimulation Regulation:   lighting decreased   care clustered  Reorientation Measures: reorientation provided     Problem: Skin Injury Risk Increased  Goal: Skin Health and Integrity  Outcome: Progressing  Intervention: Optimize Skin Protection  Recent Flowsheet Documentation  Taken 10/8/2024 0400 by Monet Sams RN  Pressure Reduction Techniques: pressure points protected  Pressure Reduction Devices: pressure-redistributing mattress utilized     Problem: Suicide Risk  Goal: Absence of Self-Harm  Outcome: Progressing     Problem: Restraint, Nonviolent  Goal: Absence of Harm or Injury  Outcome: Progressing  Intervention: Implement Least Restrictive Safety Strategies  Recent Flowsheet Documentation  Taken 10/8/2024 0400 by Monet Sams RN  Medical Device Protection: skin sleeve  Less Restrictive Alternative:   bed alarm in use   environment adjusted  De-Escalation Techniques:   quiet time facilitated   increased round frequency  Taken 10/8/2024 0200 by Monet Sams RN  Medical Device Protection:   skin sleeve   tubing secured  Less Restrictive Alternative:   bed alarm in use   calming techniques promoted   environment adjusted  De-Escalation Techniques:   quiet time facilitated   stimulation decreased  Taken 10/8/2024 0000 by Monet Sams RN  Medical Device Protection:   IV pole/bag removed from visual field   skin sleeve   tubing secured  Less Restrictive Alternative:   bed alarm in use   emotional support provided   environment adjusted   positive reinforcement provided   safety enhancements provided   security enhancements provided   self-care activities encouraged   sensory stimulation limited   surveillance provided  De-Escalation Techniques:   appropriate behavior reinforced   diversional activity encouraged   family involvement requested   increased round frequency   quiet time facilitated   reoriented   verbally redirected   time out  facilitated  Taken 10/7/2024 2231 by Monet Sams, RN  Medical Device Protection:   IV pole/bag removed from visual field   skin sleeve   tubing secured  Less Restrictive Alternative:   1:1 observation maintained   appropriate expression promoted   bed alarm in use   calming techniques promoted   coping techniques promoted   emotional support provided   environment adjusted   familiar comfort object encouraged   family presence promoted   medication offered   physical activity promoted   sensory stimulation limited  De-Escalation Techniques:   diversional activity encouraged   increased round frequency   medication administered   quiet time facilitated   reoriented   stimulation decreased  Intervention: Protect Skin and Joint Integrity  Recent Flowsheet Documentation  Taken 10/8/2024 0400 by Monet Sams, RN  Range of Motion: ROM (range of motion) performed   Goal Outcome Evaluation:

## 2024-10-08 NOTE — PLAN OF CARE
Goal Outcome Evaluation:                                              Problem: Suicide Risk  Goal: Absence of Self-Harm  10/8/2024 0626 by Monet Sams, RN  Outcome: Resolved for upgrade, new template will be applied  Problem: Restraint, Nonviolent  Goal: Absence of Harm or Injury  10/8/2024 0626 by Monet Sams, RN  Outcome: Resolved for upgrade, new template will be applied

## 2024-10-08 NOTE — PROGRESS NOTES
Neurology       Patient Care Team:  Frandy Kuhn MD as PCP - General  Frandy Kuhn MD as PCP - Family Medicine    Chief complaint: Confusion    History: Patient slept poorly but hallucinations seems to be stopped.    Tremor is improved as well.      History reviewed. No pertinent past medical history.    Vital Signs   Vitals:    10/08/24 0420 10/08/24 0750 10/08/24 1205 10/08/24 1400   BP: 124/74 112/96 114/53    BP Location: Right arm Right arm Right arm    Patient Position: Lying Lying Lying    Pulse: 72 74 71 100   Resp: 16 16 16    Temp: 97.9 °F (36.6 °C) 98.3 °F (36.8 °C) 98.2 °F (36.8 °C)    TempSrc: Axillary Oral Oral    SpO2: 96%      Weight:       Height:           Physical Exam:   General: Calm and alert              Neuro: Double identifying her son thinking that he is her grandson.    Tremors dramatically improved.    Results Review:  Reviewed  Results from last 7 days   Lab Units 10/07/24  0811   WBC 10*3/mm3 10.53   HEMOGLOBIN g/dL 12.8   HEMATOCRIT % 40.4   PLATELETS 10*3/mm3 306     Results from last 7 days   Lab Units 10/07/24  0811 10/06/24  1942   SODIUM mmol/L 138 138   POTASSIUM mmol/L 3.4* 4.1   CHLORIDE mmol/L 101 102   CO2 mmol/L 21.0* 19.0*   BUN mg/dL 29* 26*   CREATININE mg/dL 1.44* 1.71*   CALCIUM mg/dL 9.4 9.9   BILIRUBIN mg/dL  --  0.9   ALK PHOS U/L  --  73   ALT (SGPT) U/L  --  24   AST (SGOT) U/L  --  41*   GLUCOSE mg/dL 75 102*       Imaging Results (Last 24 Hours)       ** No results found for the last 24 hours. **            Assessment:  Probable Alzheimer's disease.    Sleep deprivation    Essential tremor    Plan:  Increase Seroquel to 25 mg at bedtime    Comment:  Continue to follow         I discussed the patients findings and my recommendations with patient, family, and primary care team    William Rosenberg MD  10/08/24  15:07 EDT

## 2024-10-08 NOTE — CASE MANAGEMENT/SOCIAL WORK
Continued Stay Note   [facility]     Patient Name: Marycruz Funez  MRN: [id number]  Today's Date: 10/8/2024    Admit Date: 10/6/2024    Plan: SNF   Discharge Plan       Row Name 10/08/24 1326       Plan    Plan SNF    Patient/Family in Agreement with Plan yes    Plan Comments Spoke with patient and son.  Therapy recommends SNF at discharge.  Son would like referrals to facilities in [facility].  CM will continue to follow and make referrals when appropriate.    Final Discharge Disposition Code 03 - skilled nursing facility (SNF)                   Discharge Codes    No documentation.                 Expected Discharge Date and Time       Expected Discharge Date Expected Discharge Time    Oct 11, 2024               Nicolasa Branham RN

## 2024-10-08 NOTE — CONSULTS
"          Clinical Nutrition Assessment     Patient Name: Marycruz Funez  YOB: 1940  MRN: [id number]  Date of Encounter: 10/07/24 20:24 EDT  Admission date: 10/6/2024  Reason for Visit: Identified at risk by screening criteria, MST score 2+, Malnutrition Severity Assessment    Assessment   Nutrition Assessment   Admission Diagnosis:  AMS (altered mental status) [R41.82]    Problem List:    AMS (altered mental status)    Dysphagia    Gastro-esophageal reflux disease with esophagitis    Sensorineural hearing loss (SNHL) of both ears      PMH:   She  has no past medical history on file.    PSH:  She  has a past surgical history that includes Breast cyst aspiration (Left, 2002) and Hysterectomy (1990).    Applicable Nutrition History:   10/7 SLP rec Pureed diet Thin liquid    Anthropometrics     Height: Height: 167.6 cm (66\")  Last Filed Weight: Weight: 46.3 kg (102 lb) (10/06/24 1840)  Method:   \"guess\" per family  BMI: BMI (Calculated): 16.5    UBW:  Per EMR wt of 137 lbs on 2022 120 lbs on 3/27/24 and 121 lbs on 5/15/24   Weight change: certainly historical loss but unable to confirm recent loss as current wt is a family guess    Nutrition Focused Physical Exam    Date: 10/7    Patient meets criteria for malnutrition diagnosis, see MSA note.     Subjective   Reported/Observed/Food/Nutrition Related History:     10/7  Family confirms intake < 1/2 usual over last 2 weeks and certain pt has lost wt. Unsure of how much.       Current Nutrition Prescription   PO: Diet: Regular/House; Texture: Pureed (NDD 1); Fluid Consistency: Thin (IDDSI 0)  Oral Nutrition Supplement:   Intake: Insufficient data    Assessment & Plan   Nutrition Diagnosis   Date:              Updated:    Problem Malnutrition  severe acute   Etiology Depressed intake w AMS   Signs/Symptoms Intake hx w wasting   Status: New      Goal / Objectives:   Nutrition to support treatment and Establish PO      Nutrition Intervention      Follow " treatment progress, Care plan reviewed, Advise alternate selection, Menu provided, Supplement provided    Magic Cup added 2x/da    Monitoring/Evaluation:   Per protocol, I&O, PO intake, Supplement intake, Pertinent labs, Weight, Symptoms, Swallow function    Gena Guy RD  Time Spent: 30min

## 2024-10-08 NOTE — PROGRESS NOTES
Malnutrition Severity Assessment    Patient Name:  Marycruz Funez  YOB: 1940  MRN: [id number]  Admit Date:  10/6/2024    Patient meets criteria for : Severe Malnutrition (Pt meets criteria for severe acute malnutrition based on intake hx w wasting.)    Comments:      Malnutrition Severity Assessment  Malnutrition Type: Acute Disease or Injury - Related Malnutrition  Malnutrition Type (Last 8 Hours)       Malnutrition Severity Assessment       Row Name 10/07/24 2020       Malnutrition Severity Assessment    Malnutrition Type Acute Disease or Injury - Related Malnutrition      Row Name 10/07/24 2020       Insufficient Energy Intake     Insufficient Energy Intake Findings Severe    Insufficient Energy Intake  < or equal to 50% of est. energy requirement for > or equal to 5d)      Row Name 10/07/24 2020       Unintentional Weight Loss     Unintentional Weight Loss Findings --  unable to confirm      Row Name 10/07/24 2020       Muscle Loss    Loss of Muscle Mass Findings Moderate    Caodaism Region Moderate - slight depression    Clavicle Bone Region Moderate - some protrusion in females, visible in males    Acromion Bone Region Moderate - acromion may slightly protrude    Scapular Bone Region Moderate - mild depression, bones may show slightly    Dorsal Hand Region --  mild    Patellar Region Moderate - patella more prominent, less muscle definition around patella    Anterior Thigh Region Moderate - mild depression on inner thigh    Posterior Calf Region Moderate - some roundness, slight firmness      Row Name 10/07/24 2020       Fat Loss    Subcutaneous Fat Loss Findings Moderate    Orbital Region  Moderate -  somewhat hollowness, slightly dark circles    Thoracic & Lumbar Region Moderate - ribs visible with mild depressions, iliac crest somewhat prominent      Row Name 10/07/24 2020       Criteria Met (Must meet criteria for severity in at least 2 of these categories: M Wasting, Fat Loss, Fluid,  Secondary Signs, Wt. Status, Intake)    Patient meets criteria for  Severe Malnutrition  Pt meets criteria for severe acute malnutrition based on intake hx w wasting.                    Electronically signed by:  Gena Guy RD  10/07/24 20:30 EDT
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
## 2024-10-08 NOTE — NURSING NOTE
After the night medication was given pt was getting more confused, yelling and trying to get out of bed. Notified charge nurse, no sitter available, Called on call and spoke with Dr. Frye no new med ordered, restraints BUE ordered. Pt started TOBY kooat 2231. Called family and spoke with son Rik. He will be here in the morning.

## 2024-10-08 NOTE — PLAN OF CARE
Goal Outcome Evaluation:        Problem: Adult Inpatient Plan of Care  Goal: Absence of Hospital-Acquired Illness or Injury  Intervention: Identify and Manage Fall Risk  Recent Flowsheet Documentation  Taken 10/8/2024 1800 by Mary Copeland RN  Safety Promotion/Fall Prevention:   activity supervised   assistive device/personal items within reach   clutter free environment maintained   lighting adjusted   safety round/check completed   room organization consistent  Taken 10/8/2024 1600 by Mary Copeland RN  Safety Promotion/Fall Prevention:   assistive device/personal items within reach   activity supervised   clutter free environment maintained   lighting adjusted   room organization consistent   safety round/check completed  Taken 10/8/2024 1400 by Mary Copeland RN  Safety Promotion/Fall Prevention:   activity supervised   assistive device/personal items within reach   clutter free environment maintained   lighting adjusted   room organization consistent   safety round/check completed  Taken 10/8/2024 1200 by Mary Copeland RN  Safety Promotion/Fall Prevention:   activity supervised   assistive device/personal items within reach   clutter free environment maintained   lighting adjusted   room organization consistent   safety round/check completed  Taken 10/8/2024 1000 by Mary Copeland RN  Safety Promotion/Fall Prevention:   assistive device/personal items within reach   activity supervised   clutter free environment maintained   lighting adjusted   room organization consistent   safety round/check completed  Taken 10/8/2024 0800 by Mary Copeland RN  Safety Promotion/Fall Prevention:   activity supervised   assistive device/personal items within reach   clutter free environment maintained   lighting adjusted   room organization consistent   safety round/check completed     Problem: Adult Inpatient Plan of Care  Goal: Absence of Hospital-Acquired Illness or Injury  Intervention: Prevent and  Manage VTE (Venous Thromboembolism) Risk  Recent Flowsheet Documentation  Taken 10/8/2024 1800 by Mary Copeland, RN  Activity Management: activity encouraged  Taken 10/8/2024 1600 by Mary Copeland RN  Activity Management: activity encouraged  Taken 10/8/2024 1200 by Mary Copeland RN  Activity Management: activity encouraged  Taken 10/8/2024 1000 by Mary Copeland RN  Activity Management: activity encouraged  Taken 10/8/2024 0800 by Mary Copeland RN  Activity Management: activity encouraged  VTE Prevention/Management: patient refused intervention  Range of Motion: active ROM (range of motion) encouraged     Problem: Adult Inpatient Plan of Care  Goal: Optimal Comfort and Wellbeing  Intervention: Provide Person-Centered Care  Recent Flowsheet Documentation  Taken 10/8/2024 0800 by Mary Copeland RN  Trust Relationship/Rapport:   care explained   thoughts/feelings acknowledged

## 2024-10-08 NOTE — NURSING NOTE
Pt was following commands at 0516. Got up to bedside commode. More calm and cooperative. Discontinued restraints.

## 2024-10-09 ENCOUNTER — TELEPHONE (OUTPATIENT)
Dept: GASTROENTEROLOGY | Facility: CLINIC | Age: 84
End: 2024-10-09

## 2024-10-09 PROCEDURE — 25010000002 HEPARIN (PORCINE) PER 1000 UNITS: Performed by: STUDENT IN AN ORGANIZED HEALTH CARE EDUCATION/TRAINING PROGRAM

## 2024-10-09 PROCEDURE — 99232 SBSQ HOSP IP/OBS MODERATE 35: CPT | Performed by: PHYSICIAN ASSISTANT

## 2024-10-09 PROCEDURE — 25010000002 THIAMINE PER 100 MG: Performed by: STUDENT IN AN ORGANIZED HEALTH CARE EDUCATION/TRAINING PROGRAM

## 2024-10-09 PROCEDURE — 25810000003 SODIUM CHLORIDE 0.9 % SOLUTION: Performed by: NURSE PRACTITIONER

## 2024-10-09 PROCEDURE — 99232 SBSQ HOSP IP/OBS MODERATE 35: CPT | Performed by: PSYCHIATRY & NEUROLOGY

## 2024-10-09 RX ORDER — DONEPEZIL HYDROCHLORIDE 10 MG/1
10 TABLET, FILM COATED ORAL NIGHTLY
Status: DISCONTINUED | OUTPATIENT
Start: 2024-10-09 | End: 2024-10-14 | Stop reason: HOSPADM

## 2024-10-09 RX ORDER — AMOXICILLIN 250 MG
2 CAPSULE ORAL NIGHTLY
Status: DISCONTINUED | OUTPATIENT
Start: 2024-10-09 | End: 2024-10-14 | Stop reason: HOSPADM

## 2024-10-09 RX ORDER — QUETIAPINE FUMARATE 25 MG/1
50 TABLET, FILM COATED ORAL NIGHTLY
Status: DISCONTINUED | OUTPATIENT
Start: 2024-10-09 | End: 2024-10-14 | Stop reason: HOSPADM

## 2024-10-09 RX ORDER — POLYETHYLENE GLYCOL 3350 17 G/17G
17 POWDER, FOR SOLUTION ORAL DAILY
Status: DISCONTINUED | OUTPATIENT
Start: 2024-10-09 | End: 2024-10-14 | Stop reason: HOSPADM

## 2024-10-09 RX ADMIN — THIAMINE HCL TAB 100 MG 250 MG: 100 TAB at 14:04

## 2024-10-09 RX ADMIN — Medication 5 MG: at 20:57

## 2024-10-09 RX ADMIN — ASPIRIN 81 MG: 81 TABLET, COATED ORAL at 08:13

## 2024-10-09 RX ADMIN — HEPARIN SODIUM 5000 UNITS: 5000 INJECTION INTRAVENOUS; SUBCUTANEOUS at 06:09

## 2024-10-09 RX ADMIN — PRIMIDONE 25 MG: 50 TABLET ORAL at 20:58

## 2024-10-09 RX ADMIN — PAROXETINE HYDROCHLORIDE 40 MG: 20 TABLET, FILM COATED ORAL at 08:13

## 2024-10-09 RX ADMIN — QUETIAPINE FUMARATE 50 MG: 25 TABLET ORAL at 20:57

## 2024-10-09 RX ADMIN — HEPARIN SODIUM 5000 UNITS: 5000 INJECTION INTRAVENOUS; SUBCUTANEOUS at 20:56

## 2024-10-09 RX ADMIN — PRIMIDONE 25 MG: 50 TABLET ORAL at 08:13

## 2024-10-09 RX ADMIN — SENNOSIDES AND DOCUSATE SODIUM 2 TABLET: 50; 8.6 TABLET ORAL at 20:58

## 2024-10-09 RX ADMIN — Medication 1 TABLET: at 08:13

## 2024-10-09 RX ADMIN — THIAMINE HYDROCHLORIDE 250 MG: 100 INJECTION, SOLUTION INTRAMUSCULAR; INTRAVENOUS at 06:10

## 2024-10-09 RX ADMIN — ATORVASTATIN CALCIUM 20 MG: 20 TABLET, FILM COATED ORAL at 08:13

## 2024-10-09 RX ADMIN — DONEPEZIL HYDROCHLORIDE 10 MG: 10 TABLET, FILM COATED ORAL at 20:58

## 2024-10-09 RX ADMIN — SODIUM CHLORIDE 250 ML: 9 INJECTION, SOLUTION INTRAVENOUS at 23:57

## 2024-10-09 RX ADMIN — THIAMINE HCL TAB 100 MG 250 MG: 100 TAB at 20:58

## 2024-10-09 RX ADMIN — HEPARIN SODIUM 5000 UNITS: 5000 INJECTION INTRAVENOUS; SUBCUTANEOUS at 14:05

## 2024-10-09 RX ADMIN — POLYETHYLENE GLYCOL 3350 17 G: 17 POWDER, FOR SOLUTION ORAL at 17:53

## 2024-10-09 RX ADMIN — HEPARIN SODIUM 5000 UNITS: 5000 INJECTION INTRAVENOUS; SUBCUTANEOUS at 21:09

## 2024-10-09 NOTE — PROGRESS NOTES
[facility] Medicine Services  PROGRESS NOTE    Patient Name: Marycruz Funez  : 1940  MRN: [id number]    Date of Admission: 10/6/2024  Primary Care Physician: Frandy Kuhn MD    Subjective     CC: f/u dementia    HPI:  Resting in bed. Didn't sleep much last night.     Objective     Vital Signs:   Temp:  [97.9 °F (36.6 °C)-98.3 °F (36.8 °C)] 97.9 °F (36.6 °C)  Heart Rate:  [61-82] 61  Resp:  [16] 16  BP: (106-123)/(54-76) 106/54     Physical Exam:  Constitutional: Frail, elderly female. Laying in bed  HENT: NCAT, mucous membranes moist  Respiratory: Clear to auscultation bilaterally, respiratory effort normal   Cardiovascular: RRR  Gastrointestinal: Positive bowel sounds, soft, nontender, nondistended  Musculoskeletal: No bilateral ankle edema. Toes are cool to touch with purple discoloration, palpable pedal pulses bilaterally   Psychiatric: Calm. Appropriate affect, cooperative with exam  Neurologic: Oriented to self. No significant tremor. Moves all extremities spontaneously without focal deficits, speech clear    Results Reviewed:  LAB RESULTS:      Lab 10/07/24  0811 10/06/24  2253 10/06/24  1942   WBC 10.53  --  9.24   HEMOGLOBIN 12.8  --  14.4   HEMATOCRIT 40.4  --  45.0   PLATELETS 306  --  330   NEUTROS ABS  --   --  7.35*   IMMATURE GRANS (ABS)  --   --  0.03   LYMPHS ABS  --   --  0.93   MONOS ABS  --   --  0.66   EOS ABS  --   --  0.17   MCV 87.3  --  86.4   LACTATE  --  1.1 3.5*   HSTROP T  --   --  19*         Lab 10/07/24  0811 10/06/24  1942   SODIUM 138 138   POTASSIUM 3.4* 4.1   CHLORIDE 101 102   CO2 21.0* 19.0*   ANION GAP 16.0* 17.0*   BUN 29* 26*   CREATININE 1.44* 1.71*   EGFR 35.9* 29.2*   GLUCOSE 75 102*   CALCIUM 9.4 9.9   MAGNESIUM 2.1  --    TSH  --  1.480         Lab 10/06/24  1942   TOTAL PROTEIN 7.3   ALBUMIN 4.6   GLOBULIN 2.7   ALT (SGPT) 24   AST (SGOT) 41*   BILIRUBIN 0.9   ALK PHOS 73         Lab 10/06/24  1942   PROBNP 195.9   HSTROP T 19*              Lab 10/07/24  0811   VITAMIN B 12 441         Brief Urine Lab Results  (Last result in the past 365 days)        Color   Clarity   Blood   Leuk Est   Nitrite   Protein   CREAT   Urine HCG        10/06/24 2044 Yellow   Clear   Negative   Negative   Negative   30 mg/dL (1+)                   Microbiology Results Abnormal       Procedure Component Value - Date/Time    Blood Culture - Blood, Arm, Left [[id number]]  (Normal) Collected: 10/1940    Lab Status: Preliminary result Specimen: Blood from Arm, Left Updated: 10/08/24 2000     Blood Culture No growth at 2 days    Blood Culture - Blood, Arm, Right [[id number]]  (Normal) Collected: 10/06/24 1943    Lab Status: Preliminary result Specimen: Blood from Arm, Right Updated: 10/08/24 2000     Blood Culture No growth at 2 days          No radiology results from the last 24 hrs    Current medications:  Scheduled Meds:aspirin, 81 mg, Oral, Daily  atorvastatin, 20 mg, Oral, Daily  b complex-vitamin c-folic acid, 1 tablet, Oral, Daily  donepezil, 10 mg, Oral, Nightly  heparin (porcine), 5,000 Units, Subcutaneous, Q8H  melatonin, 5 mg, Oral, Nightly  PARoxetine, 40 mg, Oral, Daily  primidone, 25 mg, Oral, Q12H  QUEtiapine, 50 mg, Oral, Nightly  sodium chloride, 10 mL, Intravenous, Q12H  [START ON 10/10/2024] thiamine, 100 mg, Oral, Daily  thiamine, 250 mg, Oral, BID      Continuous Infusions:   PRN Meds:.  acetaminophen **OR** acetaminophen **OR** acetaminophen    senna-docusate sodium **AND** polyethylene glycol **AND** bisacodyl **AND** bisacodyl    Calcium Replacement - Follow Nurse / BPA Driven Protocol    Magnesium Standard Dose Replacement - Follow Nurse / BPA Driven Protocol    Phosphorus Replacement - Follow Nurse / BPA Driven Protocol    Potassium Replacement - Follow Nurse / BPA Driven Protocol    [COMPLETED] Insert Peripheral IV **AND** sodium chloride    sodium chloride    Assessment & Plan     Active Hospital Problems    Diagnosis  POA    **AMS  (altered mental status) [R41.82]  Yes    Severe malnutrition [E43]  Yes    Gastro-esophageal reflux disease with esophagitis [K21.00]  Yes    Sensorineural hearing loss (SNHL) of both ears [H90.3]  Yes    Dysphagia [R13.10]  Yes      Resolved Hospital Problems   No resolved problems to display.     Brief Hospital Course to date:  Marycruz Funez is a 84 y.o. female w h/o CKDIIIb, esophageal dysphagia (data deficit), cognitive impairment (mild in 2022) + tremor who presented w concern for hallucinations, wandering, erratic driving. On admission, hallucinations and hyperactivity, UDS + benzo without explanation (patient denies, no recent scripts)    Worsening mental decline, acutely x weeks, on baseline memory impairment  - Medication adjustments per neurology  - My partner and neurology did not feel patient demonstrated capacity to make medical decisions. Son involved  - CM following for disposition. SNF. Would benefit from memory care facility     Essential tremor - improved on primidone  CKDIIIB  Esophageal dysphagia - appears chronic complaint, consider EGD here if persistent complaint v OP  Severe malnutrition    Expected Discharge Location and Transportation: SNF  Expected Discharge Expected Discharge Date: 10/11/2024; Expected Discharge Time:      VTE Prophylaxis: Pharmacologic VTE prophylaxis orders are present.    AM-PAC 6 Clicks Score (PT): 12 (10/09/24 0800)    CODE STATUS:   Code Status and Medical Interventions: CPR (Attempt to Resuscitate); Full Support   Ordered at: 10/07/24 0138     Level Of Support Discussed With:    Health Care Surrogate     Code Status (Patient has no pulse and is not breathing):    CPR (Attempt to Resuscitate)     Medical Interventions (Patient has pulse or is breathing):    Full Support     Helena Vasques PA-C  10/09/24

## 2024-10-09 NOTE — CASE MANAGEMENT/SOCIAL WORK
Continued Stay Note  [facility]     Patient Name: Marycruz Funez  MRN: [id number]  Today's Date: 10/9/2024    Admit Date: 10/6/2024    Plan: SNF   Discharge Plan       Row Name 10/09/24 0830       Plan    Plan SNF    Patient/Family in Agreement with Plan yes    Plan Comments Patient's plan is SNF at discharge.  Referral faxed to [facility] and [facility].  Referral also called to Sandie for [facility] and [facility] for [facility] [place].  CM will continue to follow.    Final Discharge Disposition Code 03 - skilled nursing facility (SNF)                   Discharge Codes    No documentation.                 Expected Discharge Date and Time       Expected Discharge Date Expected Discharge Time    Oct 11, 2024               Nicolasa Branham RN

## 2024-10-09 NOTE — DISCHARGE PLACEMENT REQUEST
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          Bed Mobility    Bed Mobility supine-sit;sit-supine;scooting/bridging  -AB     Scooting/Bridging [place] (Bed Mobility) minimum assist (75% patient effort);verbal cues  -AB     Supine-Sit [place] (Bed Mobility) contact guard;1 person assist;verbal cues  -AB     Sit-Supine [place] (Bed Mobility) minimum assist (75% patient effort);1 person assist;verbal cues  -AB     Assistive Device (Bed Mobility) head of bed elevated;bed rails  -AB     Comment, (Bed Mobility) Good use of UE's on railing in order to sit EOB. Required assist with BLE on return to supine.  -AB       Row Name 10/07/24 1498          Transfers    Comment, (Transfers) Cues for hand placement and sequencing. Pt easily distracted by hallucinations and required consistent re-direction to task.  -AB       Row Name 10/07/24 6905          Bed-Chair Transfer    Comment, (Bed-Chair Transfer) Deferred d/t safety concerns.  -AB       Row Name 10/07/24 1345          Sit-Stand Transfer    Sit-Stand [place]  (Transfers) minimum assist (75% patient effort);2 person assist;verbal cues;nonverbal cues (demo/gesture)  -AB     Assistive Device (Sit-Stand Transfers) walker, front-wheeled  -AB     Comment, (Sit-Stand Transfer) Posterior lean in standing  -AB       Row Name 10/07/24 1345          Gait/Stairs (Locomotion)    [place] Level (Gait) minimum assist (75% patient effort);2 person assist;verbal cues;nonverbal cues (demo/gesture)  -AB     Assistive Device (Gait) walker, front-wheeled  -AB     Patient was able to Ambulate yes  -AB     Distance in Feet (Gait) 4  -AB     Deviations/Abnormal Patterns (Gait) bilateral deviations;base of support, narrow;stride length decreased;festinating/shuffling  -AB     Bilateral Gait Deviations forward flexed posture;heel strike decreased  -AB     [place] Level (Stairs) not tested  -AB     Comment, (Gait/Stairs) Pt took short, shuffling steps to HOB with min A for improved stability. Cues provided for walker positioning and forward gaze. Minimal improvement noted with cues. Pt reports significant fatigue and returned to sitting. No overt LOB.  -AB               User Key  (r) = Recorded By, (t) = Taken By, (c) = Cosigned By      Initials Name Provider Type    AB Nyasia Herrera, PT Physical Therapist                   Obj/Interventions       Row Name 10/07/24 1348          Range of Motion Comprehensive    General Range of Motion bilateral lower extremity ROM WFL  -AB       Row Name 10/07/24 1348          Strength Comprehensive (MMT)    General Manual Muscle Testing (MMT) Assessment lower extremity strength deficits identified  -AB     Comment, General Manual Muscle Testing (MMT) Assessment BLE grossly 3+/5 as observed during functional  mobility. Pt unable to follow commands for formal MMT  -AB       Row Name 10/07/24 1348          Motor Skills    Motor Skills coordination  -AB     Coordination bilateral;lower extremity;moderate impairment  -AB       Row Name 10/07/24 1347           Balance    Balance Assessment sitting static balance;sitting dynamic balance;standing static balance;standing dynamic balance  -AB     Static Sitting Balance contact guard  -AB     Dynamic Sitting Balance minimal assist  -AB     Position, Sitting Balance unsupported;sitting edge of bed  -AB     Static Standing Balance minimal assist  -AB     Dynamic Standing Balance minimal assist;2-person assist;verbal cues;non-verbal cues (demo/gesture)  -AB     Position/Device Used, Standing Balance supported;walker, front-wheeled  -AB     Balance Interventions sitting;standing;sit to stand;supported;static;dynamic;occupation based/functional task  -AB     Comment, Balance Min A to steady  -AB       Row Name 10/07/24 1348          Sensory Assessment (Somatosensory)    Sensory Assessment (Somatosensory) LE sensation intact  -AB               User Key  (r) = Recorded By, (t) = Taken By, (c) = Cosigned By      Initials Name Provider Type    AB Nyasia Herrera, PT Physical Therapist                   Goals/Plan       Row Name 10/07/24 1423          Bed Mobility Goal 1 (PT)    Activity/Assistive Device (Bed Mobility Goal 1, PT) sit to supine;supine to sit  -AB     [place] Level/Cues Needed (Bed Mobility Goal 1, PT) supervision required  -AB     Time Frame (Bed Mobility Goal 1, PT) short term goal (STG);5 days  -AB       Row Name 10/07/24 1423          Transfer Goal 1 (PT)    Activity/Assistive Device (Transfer Goal 1, PT) sit-to-stand/stand-to-sit;bed-to-chair/chair-to-bed;walker, rolling  -AB     [place] Level/Cues Needed (Transfer Goal 1, PT) contact guard required  -AB     Time Frame (Transfer Goal 1, PT) long term goal (LTG);10 days  -AB       Row Name 10/07/24 1423          Gait Training Goal 1 (PT)    Activity/Assistive Device (Gait Training Goal 1, PT) gait (walking locomotion);assistive device use;walker, rolling  -AB     [place] Level (Gait Training Goal 1, PT) contact guard required  -AB     Distance (Gait  Training Goal 1, PT) 100  -AB     Time Frame (Gait Training Goal 1, PT) long term goal (LTG);10 days  -AB       Row Name 10/07/24 1423          Therapy Assessment/Plan (PT)    Planned Therapy Interventions (PT) balance training;bed mobility training;gait training;home exercise program;patient/family education;postural re-education;transfer training;stretching;strengthening;ROM (range of motion)  -AB               User Key  (r) = Recorded By, (t) = Taken By, (c) = Cosigned By      Initials Name Provider Type    AB Nyasia Herrera, PT Physical Therapist                   Clinical Impression       Row Name 10/07/24 1350          Pain    Pretreatment Pain Rating 0/10 - no pain  -AB     Posttreatment Pain Rating 0/10 - no pain  -AB       Row Name 10/07/24 1350          Pain Scale: FACES Pre/Post-Treatment    Pain: FACES Scale, Pretreatment 0-->no hurt  -AB     Posttreatment Pain Rating 0-->no hurt  -AB       Row Name 10/07/24 1350          Plan of Care Review    Plan of Care Reviewed With patient;son  -AB     Progress no change  -AB     Outcome Evaluation PT initial eval completed. Pt presents with confusion, hallucinations, generalized weakness, coordination deficits, and impaired balance. Pt required consistent redirection to task and cues  for safety awareness. Min A provided for side-steps to HOB. Further activity limited by weakness and fatigue. IPPT is warrented for addressing deficits. PT rec d/c to IPR for best functional outcomes. Rec OT eval for addressing ADL needs.  -AB       Row Name 10/07/24 1359          Therapy Assessment/Plan (PT)    Patient/Family Therapy Goals Statement (PT) did not state  -AB     Rehab Potential (PT) fair, will monitor progress closely  -AB     Criteria for Skilled Interventions Met (PT) yes;meets criteria;skilled treatment is necessary  -AB     Therapy Frequency (PT) daily  -AB       Row Name 10/07/24 1350          Vital Signs    Pre Systolic BP Rehab 136  -AB     Pre Treatment  Diastolic BP 73  -AB     Pretreatment Heart Rate (beats/min) 91  -AB     Pre SpO2 (%) 97  -AB     O2 Delivery Pre Treatment room air  -AB     O2 Delivery Intra Treatment room air  -AB     O2 Delivery Post Treatment room air  -AB     Pre Patient Position Supine  -AB     Intra Patient Position Standing  -AB     Post Patient Position Supine  -AB       Row Name 10/07/24 1350          Positioning and Restraints    Pre-Treatment Position in bed  -AB     Post Treatment Position bed  -AB     In Bed notified nsg;supine;call light within reach;encouraged to call for assist;exit alarm on;with family/caregiver;side rails up x2  -AB               User Key  (r) = Recorded By, (t) = Taken By, (c) = Cosigned By      Initials Name Provider Type    Nyasia Carlson PT Physical Therapist                   Outcome Measures       Row Name 10/07/24 1424 10/07/24 0800       How much help from another person do you currently need...    Turning from your back to your side while in flat bed without using bedrails? 3  -AB 3  -SM    Moving from lying on back to sitting on the side of a flat bed without bedrails? 3  -AB 3  -SM    Moving to and from a bed to a chair (including a wheelchair)? 3  -AB 3  -SM    Standing up from a chair using your arms (e.g., wheelchair, bedside chair)? 3  -AB 2  -SM    Climbing 3-5 steps with a railing? 1  -AB 2  -SM    To walk in hospital room? 2  -AB 2  -SM    AM-PAC 6 Clicks Score (PT) 15  -AB 15  -SM    Highest Level of Mobility Goal 4 --> Transfer to chair/commode  -AB 4 --> Transfer to chair/commode  -SM      Row Name 10/07/24 1424          Functional Assessment    Outcome Measure Options AM-PAC 6 Clicks Basic Mobility (PT)  -AB               User Key  (r) = Recorded By, (t) = Taken By, (c) = Cosigned By      Initials Name Provider Type    Nyasia Carlson PT Physical Therapist    Mary Kirkpatrick, RN Registered Nurse                                 Physical Therapy Education       Title: PT OT SLP  Therapies (In Progress)       Topic: Physical Therapy (In Progress)       Point: Mobility training (In Progress)       Learning Progress Summary             Patient Acceptance, E,D, NR by AB at 10/7/2024 1424                         Point: Home exercise program (Not Started)       Learner Progress:  Not documented in this visit.              Point: Body mechanics (In Progress)       Learning Progress Summary             Patient Acceptance, E,D, NR by AB at 10/7/2024 1424                         Point: Precautions (In Progress)       Learning Progress Summary             Patient Acceptance, E,D, NR by AB at 10/7/2024 1424                                         User Key       Initials Effective Dates Name Provider Type Discipline    AB 09/22/22 -  Nyasia Herrera, PT Physical Therapist PT                  PT Recommendation and Plan  Planned Therapy Interventions (PT): balance training, bed mobility training, gait training, home exercise program, patient/family education, postural re-education, transfer training, stretching, strengthening, ROM (range of motion)  Plan of Care Reviewed With: patient, son  Progress: no change  Outcome Evaluation: PT initial eval completed. Pt presents with confusion, hallucinations, generalized weakness, coordination deficits, and impaired balance. Pt required consistent redirection to task and cues  for safety awareness. Min A provided for side-steps to HOB. Further activity limited by weakness and fatigue. IPPT is warrented for addressing deficits. PT rec d/c to IPR for best functional outcomes. Rec OT eval for addressing ADL needs.     Time Calculation:   PT Evaluation Complexity  History, PT Evaluation Complexity: 3 or more personal factors and/or comorbidities  Examination of Body Systems (PT Eval Complexity): total of 4 or more elements  Clinical Presentation (PT Evaluation Complexity): evolving  Clinical Decision Making (PT Evaluation Complexity): moderate complexity  Overall  Complexity (PT Evaluation Complexity): moderate complexity     PT Charges       Row Name 10/07/24 1425             Time Calculation    Start Time 1312  -AB      PT Received On 10/07/24  -AB      PT Goal Re-Cert Due Date 10/17/24  -AB         Untimed Charges    PT Eval/Re-eval Minutes 50  -AB         Total Minutes    Untimed Charges Total Minutes 50  -AB       Total Minutes 50  -AB                User Key  (r) = Recorded By, (t) = Taken By, (c) = Cosigned By      Initials Name Provider Type    AB Nyasia Herrera, PT Physical Therapist                  Therapy Charges for Today       Code Description Service Date Service Provider Modifiers Qty    68447839718 HC PT EVAL MOD COMPLEXITY 4 10/7/2024 Nyasia Herrera, PT GP 1    02644930235 HC PT THER SUPP EA 15 MIN 10/7/2024 Nyasia Herrera, PT GP 2            PT G-Codes  Outcome Measure Options: AM-PAC 6 Clicks Basic Mobility (PT)  AM-PAC 6 Clicks Score (PT): 15  PT Discharge Summary  Anticipated Discharge Disposition (PT): inpatient rehabilitation facility    Nyasia Herrera PT  10/7/2024      Electronically signed by Nyasia Herrera, PT at 10/07/24 1426       Occupational Therapy Notes (most recent note)    No notes exist for this encounter.          Speech Language Pathology Notes (most recent note)        Tavarez, Ginette, MS CCC-SLP at 10/07/24 1106          Goal Outcome Evaluation:  Plan of Care Reviewed With: patient                  Anticipated Discharge Disposition (SLP): No further SLP services warranted          SLP Swallowing Diagnosis: swallow WFL/no suspected pharyngeal impairment, suspected esophageal dysphagia (10/07/24 1000)               Electronically signed by Ginette Tavarez MS CCC-SLP at 10/07/24 1106

## 2024-10-09 NOTE — PROGRESS NOTES
Neurology       Patient Care Team:  Frandy Kuhn MD as PCP - General  Frandy Kuhn MD as PCP - Family Medicine    Chief complaint: Dementia    History: Patient is still confused but is calm and no longer hallucinating.    Tremors significantly better as well.    The nurse says she did not sleep.  The patient says she slept and is fine.      History reviewed. No pertinent past medical history.    Vital Signs   Vitals:    10/08/24 1525 10/08/24 2000 10/09/24 0020 10/09/24 0810   BP: 123/61 106/55 112/76 106/54   BP Location: Right arm Right arm Right arm Right arm   Patient Position: Lying Lying Lying Lying   Pulse: 82 72 73 61   Resp: 16 16 16 16   Temp: 98.3 °F (36.8 °C) 98.3 °F (36.8 °C)  97.9 °F (36.6 °C)   TempSrc: Oral Oral  Oral   SpO2:    92%   Weight:       Height:           Physical Exam:   General: Awake and alert              Neuro: Calm and cooperative.    Oriented to self only.    Tremor is dramatically better with just a minimal 3/s action tremor.        Results Review:  Reviewed.  No new results  Results from last 7 days   Lab Units 10/07/24  0811   WBC 10*3/mm3 10.53   HEMOGLOBIN g/dL 12.8   HEMATOCRIT % 40.4   PLATELETS 10*3/mm3 306     Results from last 7 days   Lab Units 10/07/24  0811 10/06/24  1942   SODIUM mmol/L 138 138   POTASSIUM mmol/L 3.4* 4.1   CHLORIDE mmol/L 101 102   CO2 mmol/L 21.0* 19.0*   BUN mg/dL 29* 26*   CREATININE mg/dL 1.44* 1.71*   CALCIUM mg/dL 9.4 9.9   BILIRUBIN mg/dL  --  0.9   ALK PHOS U/L  --  73   ALT (SGPT) U/L  --  24   AST (SGOT) U/L  --  41*   GLUCOSE mg/dL 75 102*       Imaging Results (Last 24 Hours)       ** No results found for the last 24 hours. **            Assessment:  I will Alzheimer's disease.    Sleep deprivation syndrome.    Essential tremor.        Plan:  Increase Seroquel to 50 mg nightly.    Discontinue rivastigmine.    Donepezil 10 mg nightly.        Comment:  Overall the patient has improved.    Will see if we can get her strong  enough to qualify for rehab.    She likely needs admission to memory care         I discussed the patients findings and my recommendations with patient, nursing staff, and primary care team    William Rosenberg MD  10/09/24  12:03 EDT

## 2024-10-09 NOTE — TELEPHONE ENCOUNTER
[facility] staff attempted to follow warm transfer process and was unsuccessful     Caller: Beatriz Rodriguez    Relationship to patient: Emergency Contact    Best call back number: [phone]    Patient is needing: PT DAUGHTER CALLED TO CANCEL PROCEDURE. PT IS CURRENTLY IN HOSPITAL AND WILL BE GOING TO A REHAB FACILITY. PT WILL NOT BE ABLE TO MAKE PROCEDURE. IF NEEDING TO REACH OUT TO PT DAUGHTER AND NOT ABLE TO REACH HER. IT IS OKAY TO LVM. ALSO SHE DOES NOT WANT TO RESCHEDULE PT PROCEDURE RIGHT NOW. WILL CALL BACK TO RESCHEDULE WHEN READY.

## 2024-10-09 NOTE — PLAN OF CARE
Problem: Adult Inpatient Plan of Care  Goal: Plan of Care Review  Outcome: Progressing  Goal: Patient-Specific Goal (Individualized)  Outcome: Progressing  Goal: Absence of Hospital-Acquired Illness or Injury  Outcome: Progressing  Intervention: Identify and Manage Fall Risk  Recent Flowsheet Documentation  Taken 10/8/2024 2000 by Jenifer Thompson RN  Safety Promotion/Fall Prevention:   activity supervised   clutter free environment maintained   fall prevention program maintained   safety round/check completed  Intervention: Prevent Skin Injury  Recent Flowsheet Documentation  Taken 10/8/2024 2000 by Jenifer Thompson RN  Body Position: position changed independently  Skin Protection:   tubing/devices free from skin contact   transparent dressing maintained   silicone foam dressing in place   incontinence pads utilized   adhesive use limited  Intervention: Prevent and Manage VTE (Venous Thromboembolism) Risk  Recent Flowsheet Documentation  Taken 10/8/2024 2000 by Jenifer Thompson RN  Activity Management: activity encouraged  VTE Prevention/Management: patient refused intervention  Range of Motion: active ROM (range of motion) encouraged  Intervention: Prevent Infection  Recent Flowsheet Documentation  Taken 10/8/2024 2000 by Jenifer Thompson RN  Infection Prevention: environmental surveillance performed  Goal: Optimal Comfort and Wellbeing  Outcome: Progressing  Goal: Readiness for Transition of Care  Outcome: Progressing  Goal: Plan of Care Review  Outcome: Progressing  Goal: Patient-Specific Goal (Individualized)  Outcome: Progressing  Goal: Absence of Hospital-Acquired Illness or Injury  Outcome: Progressing  Intervention: Identify and Manage Fall Risk  Recent Flowsheet Documentation  Taken 10/8/2024 2000 by Jenifer Thompson RN  Safety Promotion/Fall Prevention:   activity supervised   clutter free environment maintained   fall prevention program maintained   safety round/check completed  Intervention:  Prevent Skin Injury  Recent Flowsheet Documentation  Taken 10/8/2024 2000 by Jenifer Thompson RN  Body Position: position changed independently  Skin Protection:   tubing/devices free from skin contact   transparent dressing maintained   silicone foam dressing in place   incontinence pads utilized   adhesive use limited  Intervention: Prevent and Manage VTE (Venous Thromboembolism) Risk  Recent Flowsheet Documentation  Taken 10/8/2024 2000 by Jenifer Thompson RN  Activity Management: activity encouraged  VTE Prevention/Management: patient refused intervention  Range of Motion: active ROM (range of motion) encouraged  Intervention: Prevent Infection  Recent Flowsheet Documentation  Taken 10/8/2024 2000 by Jenifer Thompson RN  Infection Prevention: environmental surveillance performed  Goal: Optimal Comfort and Wellbeing  Outcome: Progressing  Goal: Readiness for Transition of Care  Outcome: Progressing     Problem: Fall Injury Risk  Goal: Absence of Fall and Fall-Related Injury  Outcome: Progressing  Intervention: Identify and Manage Contributors  Recent Flowsheet Documentation  Taken 10/8/2024 2000 by Jenifer Thompson RN  Medication Review/Management: medications reviewed  Intervention: Promote Injury-Free Environment  Recent Flowsheet Documentation  Taken 10/8/2024 2000 by Jenifer Thompson RN  Safety Promotion/Fall Prevention:   activity supervised   clutter free environment maintained   fall prevention program maintained   safety round/check completed     Problem: UTI (Urinary Tract Infection)  Goal: Improved Infection Symptoms  Outcome: Progressing     Problem: Confusion Acute  Goal: Optimal Cognitive Function  Outcome: Progressing     Problem: Skin Injury Risk Increased  Goal: Skin Health and Integrity  Outcome: Progressing  Intervention: Optimize Skin Protection  Recent Flowsheet Documentation  Taken 10/8/2024 2000 by Jenifer Thompson RN  Pressure Reduction Techniques:   frequent weight shift encouraged    weight shift assistance provided  Pressure Reduction Devices:   foam padding utilized   heel offloading device utilized   positioning supports utilized   pressure-redistributing mattress utilized  Skin Protection:   tubing/devices free from skin contact   transparent dressing maintained   silicone foam dressing in place   incontinence pads utilized   adhesive use limited   Goal Outcome Evaluation:

## 2024-10-10 LAB
ANION GAP SERPL CALCULATED.3IONS-SCNC: 11 MMOL/L (ref 5–15)
BUN SERPL-MCNC: 14 MG/DL (ref 8–23)
BUN/CREAT SERPL: 16.3 (ref 7–25)
CALCIUM SPEC-SCNC: 9.1 MG/DL (ref 8.6–10.5)
CHLORIDE SERPL-SCNC: 103 MMOL/L (ref 98–107)
CO2 SERPL-SCNC: 22 MMOL/L (ref 22–29)
CREAT SERPL-MCNC: 0.86 MG/DL (ref 0.57–1)
EGFRCR SERPLBLD CKD-EPI 2021: 66.7 ML/MIN/1.73
GLUCOSE SERPL-MCNC: 102 MG/DL (ref 65–99)
MAGNESIUM SERPL-MCNC: 2.1 MG/DL (ref 1.6–2.4)
POTASSIUM SERPL-SCNC: 4.5 MMOL/L (ref 3.5–5.2)
SODIUM SERPL-SCNC: 136 MMOL/L (ref 136–145)

## 2024-10-10 PROCEDURE — 83735 ASSAY OF MAGNESIUM: CPT | Performed by: PHYSICIAN ASSISTANT

## 2024-10-10 PROCEDURE — 99232 SBSQ HOSP IP/OBS MODERATE 35: CPT | Performed by: NURSE PRACTITIONER

## 2024-10-10 PROCEDURE — 80048 BASIC METABOLIC PNL TOTAL CA: CPT | Performed by: PHYSICIAN ASSISTANT

## 2024-10-10 PROCEDURE — 99232 SBSQ HOSP IP/OBS MODERATE 35: CPT | Performed by: PSYCHIATRY & NEUROLOGY

## 2024-10-10 PROCEDURE — 25010000002 HEPARIN (PORCINE) PER 1000 UNITS: Performed by: STUDENT IN AN ORGANIZED HEALTH CARE EDUCATION/TRAINING PROGRAM

## 2024-10-10 RX ADMIN — Medication 5 MG: at 21:06

## 2024-10-10 RX ADMIN — THIAMINE HCL TAB 100 MG 100 MG: 100 TAB at 09:18

## 2024-10-10 RX ADMIN — DONEPEZIL HYDROCHLORIDE 10 MG: 10 TABLET, FILM COATED ORAL at 21:06

## 2024-10-10 RX ADMIN — Medication 10 ML: at 09:19

## 2024-10-10 RX ADMIN — Medication 1 TABLET: at 09:18

## 2024-10-10 RX ADMIN — QUETIAPINE FUMARATE 50 MG: 25 TABLET ORAL at 21:06

## 2024-10-10 RX ADMIN — SENNOSIDES AND DOCUSATE SODIUM 2 TABLET: 50; 8.6 TABLET ORAL at 21:07

## 2024-10-10 RX ADMIN — PAROXETINE HYDROCHLORIDE 40 MG: 20 TABLET, FILM COATED ORAL at 09:18

## 2024-10-10 RX ADMIN — PRIMIDONE 25 MG: 50 TABLET ORAL at 21:06

## 2024-10-10 RX ADMIN — Medication 10 ML: at 21:07

## 2024-10-10 RX ADMIN — HEPARIN SODIUM 5000 UNITS: 5000 INJECTION INTRAVENOUS; SUBCUTANEOUS at 21:06

## 2024-10-10 RX ADMIN — HEPARIN SODIUM 5000 UNITS: 5000 INJECTION INTRAVENOUS; SUBCUTANEOUS at 14:52

## 2024-10-10 RX ADMIN — Medication 5000 UNITS: at 09:18

## 2024-10-10 RX ADMIN — ASPIRIN 81 MG: 81 TABLET, COATED ORAL at 09:18

## 2024-10-10 RX ADMIN — POLYETHYLENE GLYCOL 3350 17 G: 17 POWDER, FOR SOLUTION ORAL at 09:18

## 2024-10-10 RX ADMIN — PRIMIDONE 25 MG: 50 TABLET ORAL at 09:17

## 2024-10-10 RX ADMIN — ATORVASTATIN CALCIUM 20 MG: 20 TABLET, FILM COATED ORAL at 09:19

## 2024-10-10 RX ADMIN — HEPARIN SODIUM 5000 UNITS: 5000 INJECTION INTRAVENOUS; SUBCUTANEOUS at 05:36

## 2024-10-10 NOTE — PROGRESS NOTES
"    [facility] Medicine Services  PROGRESS NOTE    Patient Name: Marycruz Funez  : 1940  MRN: [id number]    Date of Admission: 10/6/2024  Primary Care Physician: Frandy Kuhn MD    Subjective     CC: f/u dementia    HPI:  Patient was seen resting up in bed but awakens easily to voice.  Son at bedside.  Patient is still confused but more interactive today.  Able to tell me her name, her husbands name, and that the year is \"24\".  Cannot recall where she is but when told she is in the hospital she says \"I am at [facility]?\".  Son concerned that she is still confused more than baseline but overall improved.  Awaiting rehab.      Objective     Vital Signs:   Temp:  [97.5 °F (36.4 °C)-97.9 °F (36.6 °C)] 97.5 °F (36.4 °C)  Heart Rate:  [64-68] 68  Resp:  [16] 16  BP: ()/(50-72) 118/63     Physical Exam:  Constitutional: Frail, elderly female. Laying in bed dozing.  Awakens easily to voice.  Son at bedside.  HENT: NCAT, mucous membranes moist  Respiratory: Clear to auscultation bilaterally, respiratory effort normal   Cardiovascular: RRR  Gastrointestinal: Positive bowel sounds, soft, nontender, nondistended  Musculoskeletal: No bilateral ankle edema.  Feet to touch, palpable pedal pulses bilaterally.  RIVERA spontaneously.  Psychiatric: Calm. Appropriate affect, cooperative  Neurologic: Oriented to self, person, and to the year \"24\".  Minimal tremor.  Nonfocal.  Speech clear.  Follows simple commands.  Skin: Dry    Results Reviewed:  LAB RESULTS:      Lab 10/07/24  0811 10/06/24  2253 10/06/24  1942   WBC 10.53  --  9.24   HEMOGLOBIN 12.8  --  14.4   HEMATOCRIT 40.4  --  45.0   PLATELETS 306  --  330   NEUTROS ABS  --   --  7.35*   IMMATURE GRANS (ABS)  --   --  0.03   LYMPHS ABS  --   --  0.93   MONOS ABS  --   --  0.66   EOS ABS  --   --  0.17   MCV 87.3  --  86.4   LACTATE  --  1.1 3.5*   HSTROP T  --   --  19*         Lab 10/07/24  0811 10/06/24  1942   SODIUM 138 138 "   POTASSIUM 3.4* 4.1   CHLORIDE 101 102   CO2 21.0* 19.0*   ANION GAP 16.0* 17.0*   BUN 29* 26*   CREATININE 1.44* 1.71*   EGFR 35.9* 29.2*   GLUCOSE 75 102*   CALCIUM 9.4 9.9   MAGNESIUM 2.1  --    TSH  --  1.480         Lab 10/06/24  1942   TOTAL PROTEIN 7.3   ALBUMIN 4.6   GLOBULIN 2.7   ALT (SGPT) 24   AST (SGOT) 41*   BILIRUBIN 0.9   ALK PHOS 73         Lab 10/06/24  1942   PROBNP 195.9   HSTROP T 19*             Lab 10/07/24  0811   VITAMIN B 12 441         Brief Urine Lab Results  (Last result in the past 365 days)        Color   Clarity   Blood   Leuk Est   Nitrite   Protein   CREAT   Urine HCG        10/06/24 2044 Yellow   Clear   Negative   Negative   Negative   30 mg/dL (1+)                   Microbiology Results Abnormal       Procedure Component Value - Date/Time    Blood Culture - Blood, Arm, Right [[id number]]  (Normal) Collected: 10/06/24 1943    Lab Status: Preliminary result Specimen: Blood from Arm, Right Updated: 10/09/24 2000     Blood Culture No growth at 3 days    Blood Culture - Blood, Arm, Left [[id number]]  (Normal) Collected: 10/1940    Lab Status: Preliminary result Specimen: Blood from Arm, Left Updated: 10/09/24 2000     Blood Culture No growth at 3 days          No radiology results from the last 24 hrs    Current medications:  Scheduled Meds:aspirin, 81 mg, Oral, Daily  atorvastatin, 20 mg, Oral, Daily  b complex-vitamin c-folic acid, 1 tablet, Oral, Daily  donepezil, 10 mg, Oral, Nightly  heparin (porcine), 5,000 Units, Subcutaneous, Q8H  melatonin, 5 mg, Oral, Nightly  PARoxetine, 40 mg, Oral, Daily  polyethylene glycol, 17 g, Oral, Daily  primidone, 25 mg, Oral, Q12H  QUEtiapine, 50 mg, Oral, Nightly  senna-docusate sodium, 2 tablet, Oral, Nightly  sodium chloride, 10 mL, Intravenous, Q12H  thiamine, 100 mg, Oral, Daily  cholecalciferol, 5,000 Units, Oral, Daily      Continuous Infusions:   PRN Meds:.  acetaminophen **OR** acetaminophen **OR** acetaminophen    [DISCONTINUED]  senna-docusate sodium **AND** [DISCONTINUED] polyethylene glycol **AND** bisacodyl **AND** bisacodyl    Calcium Replacement - Follow Nurse / BPA Driven Protocol    Magnesium Standard Dose Replacement - Follow Nurse / BPA Driven Protocol    Phosphorus Replacement - Follow Nurse / BPA Driven Protocol    Potassium Replacement - Follow Nurse / BPA Driven Protocol    [COMPLETED] Insert Peripheral IV **AND** sodium chloride    sodium chloride    Assessment & Plan     Active Hospital Problems    Diagnosis  POA    **AMS (altered mental status) [R41.82]  Yes    Severe malnutrition [E43]  Yes    Gastro-esophageal reflux disease with esophagitis [K21.00]  Yes    Sensorineural hearing loss (SNHL) of both ears [H90.3]  Yes    Dysphagia [R13.10]  Yes      Resolved Hospital Problems   No resolved problems to display.     Brief Hospital Course to date:  Marycruz Funez is a 84 y.o. female w h/o CKDIIIb, esophageal dysphagia (data deficit), cognitive impairment (mild in 2022) + tremor who presented w concern for hallucinations, wandering, erratic driving. On admission, hallucinations and hyperactivity, UDS + benzo without explanation (patient denies, no recent scripts)    This patient's problems and plans were partially entered by my partner and updated as appropriate by me 10/10/24.    Assessment/plan:  Pt is new to me today     Worsening mental decline, acutely x weeks, on baseline memory impairment  - Medication adjustments per neurology  - My partner and neurology did not feel patient demonstrated capacity to make medical decisions. Son involved and at bedside  - CM following for disposition.  Pending SNF. Would benefit from memory care facility.  -- Seems slightly more coherent today.    Essential tremor - improved on primidone    Hypokalemia  -- Replaced  -- Monitor labs periodically     CKDIIIB  -- Improved at last check.  Check periodically.    Esophageal dysphagia   - appears chronic complaint   --consider EGD here if  persistent complaint vs OP    Severe malnutrition  -- Nutrition following    Expected Discharge Location and Transportation: SNF  Expected Discharge Expected Discharge Date: 10/11/2024; Expected Discharge Time:      VTE Prophylaxis: Pharmacologic VTE prophylaxis orders are present.    AM-PAC 6 Clicks Score (PT): 12 (10/10/24 0800)    CODE STATUS:   Code Status and Medical Interventions: CPR (Attempt to Resuscitate); Full Support   Ordered at: 10/07/24 0138     Level Of Support Discussed With:    Health Care Surrogate     Code Status (Patient has no pulse and is not breathing):    CPR (Attempt to Resuscitate)     Medical Interventions (Patient has pulse or is breathing):    Full Support     Tiesha Golden, APRN  10/10/24

## 2024-10-10 NOTE — PROGRESS NOTES
Neurology       Patient Care Team:  Frandy Kuhn MD as PCP - General  Frandy Kuhn MD as PCP - Family Medicine    Chief complaint: Confusion    History: The patient slept through the night and is significantly better.    She is calm and alert and interactive.  No hallucinations.  Minimal confusion  History reviewed. No pertinent past medical history.    Vital Signs   Vitals:    10/09/24 2345 10/10/24 0030 10/10/24 0340 10/10/24 0914   BP: 102/54 118/61 112/53 118/63   BP Location:   Right arm    Patient Position:   Lying    Pulse:       Resp:   16    Temp:   97.5 °F (36.4 °C)    TempSrc:   Oral    SpO2:       Weight:       Height:           Physical Exam:   General: Awake and alert              Neuro: Oriented to person place and situation.    Speech is articulate.    Tremor is minimal.        Results Review:  No new results  Results from last 7 days   Lab Units 10/07/24  0811   WBC 10*3/mm3 10.53   HEMOGLOBIN g/dL 12.8   HEMATOCRIT % 40.4   PLATELETS 10*3/mm3 306     Results from last 7 days   Lab Units 10/07/24  0811 10/06/24  1942   SODIUM mmol/L 138 138   POTASSIUM mmol/L 3.4* 4.1   CHLORIDE mmol/L 101 102   CO2 mmol/L 21.0* 19.0*   BUN mg/dL 29* 26*   CREATININE mg/dL 1.44* 1.71*   CALCIUM mg/dL 9.4 9.9   BILIRUBIN mg/dL  --  0.9   ALK PHOS U/L  --  73   ALT (SGPT) U/L  --  24   AST (SGOT) U/L  --  41*   GLUCOSE mg/dL 75 102*       Imaging Results (Last 24 Hours)       ** No results found for the last 24 hours. **            Assessment:  Sleep deprivation syndrome.    Alzheimer's disease.    Essential tremor.        Plan:  Continue current regimen.    Plan is inpatient rehab at skilled nursing and then stay in the nursing home.    Comment:  Improved         I discussed the patients findings and my recommendations with patient, family, and nursing staff    William Rosenberg MD  10/10/24  11:12 EDT

## 2024-10-10 NOTE — PLAN OF CARE
Problem: Adult Inpatient Plan of Care  Goal: Plan of Care Review  Outcome: Progressing  Goal: Patient-Specific Goal (Individualized)  Outcome: Progressing  Goal: Absence of Hospital-Acquired Illness or Injury  Outcome: Progressing  Intervention: Identify and Manage Fall Risk  Recent Flowsheet Documentation  Taken 10/10/2024 0400 by Maxine Luevano RN  Safety Promotion/Fall Prevention:   activity supervised   assistive device/personal items within reach   clutter free environment maintained   fall prevention program maintained   lighting adjusted   nonskid shoes/slippers when out of bed   safety round/check completed   room organization consistent  Taken 10/10/2024 0200 by Maxine Luevano RN  Safety Promotion/Fall Prevention:   activity supervised   assistive device/personal items within reach   clutter free environment maintained   fall prevention program maintained   lighting adjusted   nonskid shoes/slippers when out of bed   safety round/check completed   room organization consistent  Taken 10/10/2024 0000 by Maxine Luevano RN  Safety Promotion/Fall Prevention:   activity supervised   assistive device/personal items within reach   clutter free environment maintained   lighting adjusted   fall prevention program maintained   nonskid shoes/slippers when out of bed   room organization consistent   safety round/check completed  Taken 10/9/2024 2200 by Maxine Luevano RN  Safety Promotion/Fall Prevention:   activity supervised   clutter free environment maintained   assistive device/personal items within reach   fall prevention program maintained   lighting adjusted   room organization consistent   safety round/check completed   nonskid shoes/slippers when out of bed   gait belt  Taken 10/9/2024 2000 by Maxine Luevano RN  Safety Promotion/Fall Prevention:   activity supervised   clutter free environment maintained   assistive device/personal items within reach   fall prevention program maintained    lighting adjusted   nonskid shoes/slippers when out of bed   room organization consistent   safety round/check completed  Intervention: Prevent Skin Injury  Recent Flowsheet Documentation  Taken 10/10/2024 0400 by Maxine Luevano RN  Body Position:   position changed independently   side-lying   right  Skin Protection:   adhesive use limited   transparent dressing maintained   tubing/devices free from skin contact  Taken 10/10/2024 0200 by Maxine Luevano RN  Body Position:   position changed independently   supine, legs elevated  Skin Protection:   adhesive use limited   transparent dressing maintained   tubing/devices free from skin contact  Taken 10/10/2024 0000 by Maxine Luevano RN  Body Position:   position changed independently   turned   left   side-lying  Skin Protection:   adhesive use limited   transparent dressing maintained   tubing/devices free from skin contact  Taken 10/9/2024 2200 by Maxine Luevano RN  Body Position:   position changed independently   supine, legs elevated  Skin Protection:   adhesive use limited   tubing/devices free from skin contact   transparent dressing maintained  Taken 10/9/2024 2000 by Maxine Luevano RN  Body Position:   position changed independently   sitting up in bed  Skin Protection:   adhesive use limited   transparent dressing maintained   tubing/devices free from skin contact  Intervention: Prevent and Manage VTE (Venous Thromboembolism) Risk  Recent Flowsheet Documentation  Taken 10/10/2024 0400 by Maxine Luevano RN  Activity Management: activity encouraged  Taken 10/10/2024 0200 by Maxine Luevano RN  Activity Management: activity encouraged  Taken 10/10/2024 0000 by Maxine Luevano RN  Activity Management: activity encouraged  Taken 10/9/2024 2200 by Maxine Luevano RN  Activity Management: activity encouraged  VTE Prevention/Management: (pt takes heparin)   bilateral   sequential compression devices off   patient refused intervention  Range  of Motion: active ROM (range of motion) encouraged  Taken 10/9/2024 2000 by Maxine Luevano RN  Activity Management: activity encouraged  Intervention: Prevent Infection  Recent Flowsheet Documentation  Taken 10/10/2024 0400 by Maxine Luevano RN  Infection Prevention:   environmental surveillance performed   cohorting utilized   equipment surfaces disinfected   hand hygiene promoted   rest/sleep promoted  Taken 10/10/2024 0200 by Maxine Luevano RN  Infection Prevention:   cohorting utilized   environmental surveillance performed   equipment surfaces disinfected   hand hygiene promoted   rest/sleep promoted  Taken 10/10/2024 0000 by Maxine Luevano RN  Infection Prevention:   cohorting utilized   environmental surveillance performed   equipment surfaces disinfected   hand hygiene promoted   rest/sleep promoted  Taken 10/9/2024 2200 by Maxine Luevano RN  Infection Prevention:   cohorting utilized   environmental surveillance performed   equipment surfaces disinfected   hand hygiene promoted   rest/sleep promoted  Taken 10/9/2024 2000 by Maxine Luevano RN  Infection Prevention:   cohorting utilized   environmental surveillance performed   equipment surfaces disinfected   hand hygiene promoted   rest/sleep promoted  Goal: Optimal Comfort and Wellbeing  Outcome: Progressing  Intervention: Provide Person-Centered Care  Recent Flowsheet Documentation  Taken 10/9/2024 2200 by Maxine Luevano RN  Trust Relationship/Rapport:   care explained   choices provided   emotional support provided   empathic listening provided   questions answered   questions encouraged   reassurance provided   thoughts/feelings acknowledged  Goal: Readiness for Transition of Care  Outcome: Progressing  Goal: Plan of Care Review  Outcome: Progressing  Goal: Patient-Specific Goal (Individualized)  Outcome: Progressing  Goal: Absence of Hospital-Acquired Illness or Injury  Outcome: Progressing  Intervention: Identify and Manage Fall  Risk  Recent Flowsheet Documentation  Taken 10/10/2024 0400 by Maxine Luevano, RN  Safety Promotion/Fall Prevention:   activity supervised   assistive device/personal items within reach   clutter free environment maintained   fall prevention program maintained   lighting adjusted   nonskid shoes/slippers when out of bed   safety round/check completed   room organization consistent  Taken 10/10/2024 0200 by Maxine Luevano, RN  Safety Promotion/Fall Prevention:   activity supervised   assistive device/personal items within reach   clutter free environment maintained   fall prevention program maintained   lighting adjusted   nonskid shoes/slippers when out of bed   safety round/check completed   room organization consistent  Taken 10/10/2024 0000 by Maxine Luevano RN  Safety Promotion/Fall Prevention:   activity supervised   assistive device/personal items within reach   clutter free environment maintained   lighting adjusted   fall prevention program maintained   nonskid shoes/slippers when out of bed   room organization consistent   safety round/check completed  Taken 10/9/2024 2200 by Maxien Luevano, RN  Safety Promotion/Fall Prevention:   activity supervised   clutter free environment maintained   assistive device/personal items within reach   fall prevention program maintained   lighting adjusted   room organization consistent   safety round/check completed   nonskid shoes/slippers when out of bed   gait belt  Taken 10/9/2024 2000 by Maxine Luevano, RN  Safety Promotion/Fall Prevention:   activity supervised   clutter free environment maintained   assistive device/personal items within reach   fall prevention program maintained   lighting adjusted   nonskid shoes/slippers when out of bed   room organization consistent   safety round/check completed  Intervention: Prevent Skin Injury  Recent Flowsheet Documentation  Taken 10/10/2024 0400 by Maxine Luevano, RN  Body Position:   position changed  independently   side-lying   right  Skin Protection:   adhesive use limited   transparent dressing maintained   tubing/devices free from skin contact  Taken 10/10/2024 0200 by Maxine Luevano RN  Body Position:   position changed independently   supine, legs elevated  Skin Protection:   adhesive use limited   transparent dressing maintained   tubing/devices free from skin contact  Taken 10/10/2024 0000 by Maxine Luevano RN  Body Position:   position changed independently   turned   left   side-lying  Skin Protection:   adhesive use limited   transparent dressing maintained   tubing/devices free from skin contact  Taken 10/9/2024 2200 by Maxine Luevano RN  Body Position:   position changed independently   supine, legs elevated  Skin Protection:   adhesive use limited   tubing/devices free from skin contact   transparent dressing maintained  Taken 10/9/2024 2000 by Maxine Luevano RN  Body Position:   position changed independently   sitting up in bed  Skin Protection:   adhesive use limited   transparent dressing maintained   tubing/devices free from skin contact  Intervention: Prevent and Manage VTE (Venous Thromboembolism) Risk  Recent Flowsheet Documentation  Taken 10/10/2024 0400 by Maxine Luevano RN  Activity Management: activity encouraged  Taken 10/10/2024 0200 by Maxine Luevano RN  Activity Management: activity encouraged  Taken 10/10/2024 0000 by Maxine Luevano RN  Activity Management: activity encouraged  Taken 10/9/2024 2200 by Maxine Luevano RN  Activity Management: activity encouraged  VTE Prevention/Management: (pt takes heparin)   bilateral   sequential compression devices off   patient refused intervention  Range of Motion: active ROM (range of motion) encouraged  Taken 10/9/2024 2000 by Maxine Luevano RN  Activity Management: activity encouraged  Intervention: Prevent Infection  Recent Flowsheet Documentation  Taken 10/10/2024 0400 by Maxine Luevano RN  Infection  Prevention:   environmental surveillance performed   cohorting utilized   equipment surfaces disinfected   hand hygiene promoted   rest/sleep promoted  Taken 10/10/2024 0200 by Maxine Luevano RN  Infection Prevention:   cohorting utilized   environmental surveillance performed   equipment surfaces disinfected   hand hygiene promoted   rest/sleep promoted  Taken 10/10/2024 0000 by Maxine Luevano RN  Infection Prevention:   cohorting utilized   environmental surveillance performed   equipment surfaces disinfected   hand hygiene promoted   rest/sleep promoted  Taken 10/9/2024 2200 by Maxine Luevano RN  Infection Prevention:   cohorting utilized   environmental surveillance performed   equipment surfaces disinfected   hand hygiene promoted   rest/sleep promoted  Taken 10/9/2024 2000 by Maxine Luevano RN  Infection Prevention:   cohorting utilized   environmental surveillance performed   equipment surfaces disinfected   hand hygiene promoted   rest/sleep promoted  Goal: Optimal Comfort and Wellbeing  Outcome: Progressing  Intervention: Provide Person-Centered Care  Recent Flowsheet Documentation  Taken 10/9/2024 2200 by Maxine Luevano RN  Trust Relationship/Rapport:   care explained   choices provided   emotional support provided   empathic listening provided   questions answered   questions encouraged   reassurance provided   thoughts/feelings acknowledged  Goal: Readiness for Transition of Care  Outcome: Progressing     Problem: Fall Injury Risk  Goal: Absence of Fall and Fall-Related Injury  Outcome: Progressing  Intervention: Identify and Manage Contributors  Recent Flowsheet Documentation  Taken 10/10/2024 0400 by Maxine Luevano RN  Self-Care Promotion: independence encouraged  Taken 10/10/2024 0200 by Maxine Luevano RN  Self-Care Promotion: independence encouraged  Taken 10/10/2024 0000 by Maxine Luevano RN  Self-Care Promotion: independence encouraged  Taken 10/9/2024 2200 by Bassem  Maxine RN  Medication Review/Management: medications reviewed  Self-Care Promotion: independence encouraged  Taken 10/9/2024 2000 by Maxine Luevano RN  Self-Care Promotion: independence encouraged  Intervention: Promote Injury-Free Environment  Recent Flowsheet Documentation  Taken 10/10/2024 0400 by Maxine Luevano RN  Safety Promotion/Fall Prevention:   activity supervised   assistive device/personal items within reach   clutter free environment maintained   fall prevention program maintained   lighting adjusted   nonskid shoes/slippers when out of bed   safety round/check completed   room organization consistent  Taken 10/10/2024 0200 by Maxine Luevano RN  Safety Promotion/Fall Prevention:   activity supervised   assistive device/personal items within reach   clutter free environment maintained   fall prevention program maintained   lighting adjusted   nonskid shoes/slippers when out of bed   safety round/check completed   room organization consistent  Taken 10/10/2024 0000 by Maxine Luevano RN  Safety Promotion/Fall Prevention:   activity supervised   assistive device/personal items within reach   clutter free environment maintained   lighting adjusted   fall prevention program maintained   nonskid shoes/slippers when out of bed   room organization consistent   safety round/check completed  Taken 10/9/2024 2200 by Maxine Luevano RN  Safety Promotion/Fall Prevention:   activity supervised   clutter free environment maintained   assistive device/personal items within reach   fall prevention program maintained   lighting adjusted   room organization consistent   safety round/check completed   nonskid shoes/slippers when out of bed   gait belt  Taken 10/9/2024 2000 by Maxine Luevano RN  Safety Promotion/Fall Prevention:   activity supervised   clutter free environment maintained   assistive device/personal items within reach   fall prevention program maintained   lighting adjusted   nonskid  shoes/slippers when out of bed   room organization consistent   safety round/check completed     Problem: UTI (Urinary Tract Infection)  Goal: Improved Infection Symptoms  Outcome: Progressing  Intervention: Facilitate Optimal Urinary Elimination  Recent Flowsheet Documentation  Taken 10/9/2024 2200 by Maxine Luevano RN  Urinary Elimination Promotion: absorbent pad/diaper use encouraged  Intervention: Prevent Infection Progression  Recent Flowsheet Documentation  Taken 10/10/2024 0400 by Maxine Luevano RN  Infection Management: aseptic technique maintained  Taken 10/10/2024 0200 by Maxine Luevano RN  Infection Management: aseptic technique maintained  Taken 10/10/2024 0000 by Maxine Luevano RN  Infection Management: aseptic technique maintained  Taken 10/9/2024 2200 by Maxine Luevano RN  Infection Management: aseptic technique maintained     Problem: Confusion Acute  Goal: Optimal Cognitive Function  Outcome: Progressing  Intervention: Minimize Contributing Factors  Recent Flowsheet Documentation  Taken 10/10/2024 0400 by Maxine Luevano RN  Sensory Stimulation Regulation:   lighting decreased   care clustered  Reorientation Measures:   calendar in view   clock in view  Taken 10/10/2024 0200 by Maxine Luevano RN  Sensory Stimulation Regulation:   lighting decreased   care clustered  Reorientation Measures:   calendar in view   clock in view  Taken 10/10/2024 0000 by Maxine Luevano RN  Sensory Stimulation Regulation:   lighting decreased   care clustered  Reorientation Measures:   calendar in view   clock in view  Taken 10/9/2024 2200 by Maxine Luevano RN  Sensory Stimulation Regulation:   lighting decreased   care clustered  Reorientation Measures:   calendar in view   clock in view  Environment Familiarity/Consistency: daily routine followed  Taken 10/9/2024 2000 by Maxine Luevano RN  Sensory Stimulation Regulation:   lighting decreased   care clustered  Reorientation Measures:   clock  in view   calendar in view   reorientation provided     Problem: Skin Injury Risk Increased  Goal: Skin Health and Integrity  Outcome: Progressing  Intervention: Optimize Skin Protection  Recent Flowsheet Documentation  Taken 10/10/2024 0400 by Maxine Luevano RN  Pressure Reduction Techniques:   frequent weight shift encouraged   weight shift assistance provided   pressure points protected  Head of Bed (HOB) Positioning: HOB elevated  Pressure Reduction Devices:   pressure-redistributing mattress utilized   positioning supports utilized  Skin Protection:   adhesive use limited   transparent dressing maintained   tubing/devices free from skin contact  Taken 10/10/2024 0200 by Maxine Luevano RN  Pressure Reduction Techniques:   frequent weight shift encouraged   weight shift assistance provided   pressure points protected  Head of Bed (HOB) Positioning: HOB elevated  Pressure Reduction Devices:   pressure-redistributing mattress utilized   positioning supports utilized  Skin Protection:   adhesive use limited   transparent dressing maintained   tubing/devices free from skin contact  Taken 10/10/2024 0000 by aMxine Luevano RN  Pressure Reduction Techniques:   frequent weight shift encouraged   weight shift assistance provided   pressure points protected  Head of Bed (HOB) Positioning: HOB lowered  Pressure Reduction Devices:   pressure-redistributing mattress utilized   positioning supports utilized  Skin Protection:   adhesive use limited   transparent dressing maintained   tubing/devices free from skin contact  Taken 10/9/2024 2200 by Maxine Luevano RN  Pressure Reduction Techniques:   frequent weight shift encouraged   weight shift assistance provided   heels elevated off bed   pressure points protected  Head of Bed (HOB) Positioning: HOB elevated  Pressure Reduction Devices:   positioning supports utilized   pressure-redistributing mattress utilized  Skin Protection:   adhesive use limited    tubing/devices free from skin contact   transparent dressing maintained  Taken 10/9/2024 2000 by Maxine Luevano RN  Pressure Reduction Techniques:   frequent weight shift encouraged   pressure points protected   weight shift assistance provided  Head of Bed (HOB) Positioning: HOB elevated  Pressure Reduction Devices:   positioning supports utilized   pressure-redistributing mattress utilized  Skin Protection:   adhesive use limited   transparent dressing maintained   tubing/devices free from skin contact   Goal Outcome Evaluation:               10/9/24- Pt remains AOX self throughout shift. Pt only tried to get out of bed twice at beginning of shift, and did well with reorientation. Patient slept very well throughout night. RA. Non tele. Pt's urine still clear yellow color. Son at bedside, had addressed concerns regarding pt's mental status and still being agitated/confused at times. I alerted nurse prac and d/t patient having a better night tonight she advised to pass message to dayshift nurse to discuss families concerns during rounding. Overall patient has been pleasantly confused, and seems more rested compared to previously. Plan of care ongoing.

## 2024-10-10 NOTE — CASE MANAGEMENT/SOCIAL WORK
Continued Stay Note  [facility]     Patient Name: Marycruz Funez  MRN: [id number]  Today's Date: 10/10/2024    Admit Date: 10/6/2024    Plan: SNF   Discharge Plan       Row Name 10/10/24 1547       Plan    Plan SNF    Patient/Family in Agreement with Plan yes    Plan Comments Spoke with Sandie ramirez do not have an available bed at [facility].  [phone] [facility] [phone].  Spoke with Dayami at [facility] in [place].  They are still reviewing referral.  Updated patient's son by phone.  CM will continue to follow.    Final Discharge Disposition Code 03 - skilled nursing facility (SNF)                   Discharge Codes    No documentation.                 Expected Discharge Date and Time       Expected Discharge Date Expected Discharge Time    Oct 11, 2024               Nicolasa Branham RN

## 2024-10-11 LAB
ANION GAP SERPL CALCULATED.3IONS-SCNC: 9 MMOL/L (ref 5–15)
BACTERIA SPEC AEROBE CULT: NORMAL
BACTERIA SPEC AEROBE CULT: NORMAL
BUN SERPL-MCNC: 14 MG/DL (ref 8–23)
BUN/CREAT SERPL: 16.3 (ref 7–25)
CALCIUM SPEC-SCNC: 8.8 MG/DL (ref 8.6–10.5)
CHLORIDE SERPL-SCNC: 103 MMOL/L (ref 98–107)
CO2 SERPL-SCNC: 24 MMOL/L (ref 22–29)
CREAT SERPL-MCNC: 0.86 MG/DL (ref 0.57–1)
DEPRECATED RDW RBC AUTO: 45.4 FL (ref 37–54)
EGFRCR SERPLBLD CKD-EPI 2021: 66.7 ML/MIN/1.73
ERYTHROCYTE [DISTWIDTH] IN BLOOD BY AUTOMATED COUNT: 14.2 % (ref 12.3–15.4)
GLUCOSE SERPL-MCNC: 80 MG/DL (ref 65–99)
HCT VFR BLD AUTO: 39.2 % (ref 34–46.6)
HGB BLD-MCNC: 12.7 G/DL (ref 12–15.9)
MCH RBC QN AUTO: 28.3 PG (ref 26.6–33)
MCHC RBC AUTO-ENTMCNC: 32.4 G/DL (ref 31.5–35.7)
MCV RBC AUTO: 87.3 FL (ref 79–97)
PLATELET # BLD AUTO: 243 10*3/MM3 (ref 140–450)
PMV BLD AUTO: 10.5 FL (ref 6–12)
POTASSIUM SERPL-SCNC: 4.4 MMOL/L (ref 3.5–5.2)
RBC # BLD AUTO: 4.49 10*6/MM3 (ref 3.77–5.28)
SODIUM SERPL-SCNC: 136 MMOL/L (ref 136–145)
WBC NRBC COR # BLD AUTO: 5.62 10*3/MM3 (ref 3.4–10.8)

## 2024-10-11 PROCEDURE — 97166 OT EVAL MOD COMPLEX 45 MIN: CPT

## 2024-10-11 PROCEDURE — 25810000003 SODIUM CHLORIDE 0.9 % SOLUTION: Performed by: NURSE PRACTITIONER

## 2024-10-11 PROCEDURE — 97530 THERAPEUTIC ACTIVITIES: CPT

## 2024-10-11 PROCEDURE — 99232 SBSQ HOSP IP/OBS MODERATE 35: CPT | Performed by: NURSE PRACTITIONER

## 2024-10-11 PROCEDURE — 25010000002 HEPARIN (PORCINE) PER 1000 UNITS: Performed by: STUDENT IN AN ORGANIZED HEALTH CARE EDUCATION/TRAINING PROGRAM

## 2024-10-11 PROCEDURE — 80048 BASIC METABOLIC PNL TOTAL CA: CPT | Performed by: NURSE PRACTITIONER

## 2024-10-11 PROCEDURE — 85027 COMPLETE CBC AUTOMATED: CPT | Performed by: NURSE PRACTITIONER

## 2024-10-11 RX ADMIN — SENNOSIDES AND DOCUSATE SODIUM 2 TABLET: 50; 8.6 TABLET ORAL at 21:21

## 2024-10-11 RX ADMIN — Medication 10 ML: at 21:21

## 2024-10-11 RX ADMIN — Medication 5000 UNITS: at 08:49

## 2024-10-11 RX ADMIN — ASPIRIN 81 MG: 81 TABLET, COATED ORAL at 08:49

## 2024-10-11 RX ADMIN — SODIUM CHLORIDE 250 ML: 9 INJECTION, SOLUTION INTRAVENOUS at 04:18

## 2024-10-11 RX ADMIN — DONEPEZIL HYDROCHLORIDE 10 MG: 10 TABLET, FILM COATED ORAL at 21:21

## 2024-10-11 RX ADMIN — PAROXETINE HYDROCHLORIDE 40 MG: 20 TABLET, FILM COATED ORAL at 08:49

## 2024-10-11 RX ADMIN — PRIMIDONE 25 MG: 50 TABLET ORAL at 21:21

## 2024-10-11 RX ADMIN — THIAMINE HCL TAB 100 MG 100 MG: 100 TAB at 08:49

## 2024-10-11 RX ADMIN — POLYETHYLENE GLYCOL 3350 17 G: 17 POWDER, FOR SOLUTION ORAL at 08:58

## 2024-10-11 RX ADMIN — ATORVASTATIN CALCIUM 20 MG: 20 TABLET, FILM COATED ORAL at 08:49

## 2024-10-11 RX ADMIN — Medication 10 ML: at 08:51

## 2024-10-11 RX ADMIN — QUETIAPINE FUMARATE 50 MG: 25 TABLET ORAL at 21:21

## 2024-10-11 RX ADMIN — PRIMIDONE 25 MG: 50 TABLET ORAL at 08:49

## 2024-10-11 RX ADMIN — HEPARIN SODIUM 5000 UNITS: 5000 INJECTION INTRAVENOUS; SUBCUTANEOUS at 21:21

## 2024-10-11 RX ADMIN — Medication 1 TABLET: at 08:49

## 2024-10-11 RX ADMIN — Medication 5 MG: at 21:21

## 2024-10-11 RX ADMIN — HEPARIN SODIUM 5000 UNITS: 5000 INJECTION INTRAVENOUS; SUBCUTANEOUS at 05:40

## 2024-10-11 RX ADMIN — HEPARIN SODIUM 5000 UNITS: 5000 INJECTION INTRAVENOUS; SUBCUTANEOUS at 14:32

## 2024-10-11 NOTE — THERAPY TREATMENT NOTE
Patient Name: Marycruz Funez  : 1940    MRN: [id number]                              Today's Date: 10/11/2024       Admit Date: 10/6/2024    Visit Dx:     ICD-10-CM ICD-9-CM   1. Altered mental status, unspecified altered mental status type  R41.82 780.97   2. Dehydration  E86.0 276.51   3. Mild renal insufficiency  N28.9 593.9   4. Recent urinary tract infection  Z87.440 V13.02   5. Dysphagia, unspecified type  R13.10 787.20     Patient Active Problem List   Diagnosis    AMS (altered mental status)    Dysphagia    Gastro-esophageal reflux disease with esophagitis    Sensorineural hearing loss (SNHL) of both ears    Severe malnutrition     History reviewed. No pertinent past medical history.  Past Surgical History:   Procedure Laterality Date    BREAST CYST ASPIRATION Left     HYSTERECTOMY        General Information       Row Name 10/11/24 1148          Physical Therapy Time and Intention    Document Type therapy note (daily note)  -AE     Mode of Treatment physical therapy  -AE       Row Name 10/11/24 1148          General Information    Patient Profile Reviewed yes  -AE     Existing Precautions/Restrictions fall  -AE     Barriers to Rehab medically complex  -AE       Row Name 10/11/24 1148          Cognition    Orientation Status (Cognition) oriented x 4  -AE       Row Name 10/11/24 1148          Safety Issues, Functional Mobility    Safety Issues Affecting Function (Mobility) awareness of need for assistance;insight into deficits/self-awareness;safety precaution awareness;safety precautions follow-through/compliance;sequencing abilities;judgment;problem-solving  -AE     Impairments Affecting Function (Mobility) balance;endurance/activity tolerance;strength  -AE               User Key  (r) = Recorded By, (t) = Taken By, (c) = Cosigned By      Initials Name Provider Type    AE Louie Sanchez PT Physical Therapist                   Mobility       Row Name 10/11/24 1149          Bed Mobility     Bed Mobility supine-sit  -AE     Supine-Sit [place] (Bed Mobility) contact guard  -AE     Assistive Device (Bed Mobility) head of bed elevated;bed rails  -AE     Comment, (Bed Mobility) No cues needed for bed mobility to sit at EOB. Good sequencing noted.  -AE       Row Name 10/11/24 1149          Transfers    Comment, (Transfers) VCs for hand placement and sequencing. Pt requires cues to improve safety awareness and reduce impulsiveness.  -AE       Row Name 10/11/24 1149          Sit-Stand Transfer    Sit-Stand [place] (Transfers) contact guard;verbal cues  -AE     Assistive Device (Sit-Stand Transfers) walker, front-wheeled  -AE       Row Name 10/11/24 1149          Gait/Stairs (Locomotion)    [place] Level (Gait) minimum assist (75% patient effort);verbal cues  -AE     Assistive Device (Gait) walker, front-wheeled  -AE     Distance in Feet (Gait) 75  -AE     Deviations/Abnormal Patterns (Gait) bilateral deviations;base of support, narrow;stride length decreased;gait speed decreased  -AE     Comment, (Gait/Stairs) Pt demo step through gait pattern with slowed kandy and narrow LEELEE. Pt required CGA-min A at times for RW management and to improve safety awareness/balance. Further distance limited by fatigue.  -AE               User Key  (r) = Recorded By, (t) = Taken By, (c) = Cosigned By      Initials Name Provider Type    AE Louie Sanchez PT Physical Therapist                   Obj/Interventions       Row Name 10/11/24 7098          Balance    Balance Assessment sitting static balance;sitting dynamic balance;sit to stand dynamic balance;standing static balance;standing dynamic balance  -AE     Static Sitting Balance standby assist  -AE     Dynamic Sitting Balance contact guard  -AE     Position, Sitting Balance unsupported;sitting edge of bed;sitting in chair  -AE     Sit to Stand Dynamic Balance contact guard;verbal cues  -AE     Static Standing Balance contact guard  -AE     Dynamic Standing  Balance minimal assist  -AE     Position/Device Used, Standing Balance supported;walker, front-wheeled  -AE               User Key  (r) = Recorded By, (t) = Taken By, (c) = Cosigned By      Initials Name Provider Type    AE Louie Sanchez, PT Physical Therapist                   Goals/Plan    No documentation.                  Clinical Impression       Row Name 10/11/24 1152          Pain    Pretreatment Pain Rating 0/10 - no pain  -AE     Posttreatment Pain Rating 0/10 - no pain  -AE       Row Name 10/11/24 1152          Plan of Care Review    Plan of Care Reviewed With patient  -AE     Progress improving  -AE     Outcome Evaluation Pt presents with decreased functional independence and decreased safety awareness/balance. Pt ambulated 75ft with CGA-min A and RW for support. Continue to progress per pt tolerance.  -AE       Row Name 10/11/24 1152          Vital Signs    Pre Systolic BP Rehab 106  -AE     Pre Treatment Diastolic BP 54  -AE     O2 Delivery Pre Treatment room air  -AE     O2 Delivery Intra Treatment room air  -AE     O2 Delivery Post Treatment room air  -AE     Pre Patient Position Supine  -AE     Intra Patient Position Standing  -AE     Post Patient Position Sitting  -AE       Row Name 10/11/24 1152          Positioning and Restraints    Pre-Treatment Position in bed  -AE     Post Treatment Position chair  -AE     In Chair notified nsg;reclined;call light within reach;encouraged to call for assist;exit alarm on;waffle cushion;legs elevated  -AE               User Key  (r) = Recorded By, (t) = Taken By, (c) = Cosigned By      Initials Name Provider Type    AE Louie Sanchez, PT Physical Therapist                   Outcome Measures       Row Name 10/11/24 1154 10/11/24 0800       How much help from another person do you currently need...    Turning from your back to your side while in flat bed without using bedrails? 3  -AE 3  -JM    Moving from lying on back to sitting on the side of a flat bed  without bedrails? 3  -AE 3  -JM    Moving to and from a bed to a chair (including a wheelchair)? 3  -AE 3  -JM    Standing up from a chair using your arms (e.g., wheelchair, bedside chair)? 3  -AE 3  -JM    Climbing 3-5 steps with a railing? 3  -AE 3  -JM    To walk in hospital room? 3  -AE 3  -JM    AM-PAC 6 Clicks Score (PT) 18  -AE 18  -JM    Highest Level of Mobility Goal 6 --> Walk 10 steps or more  -AE 6 --> Walk 10 steps or more  -JM      Row Name 10/11/24 1154          Functional Assessment    Outcome Measure Options AM-PAC 6 Clicks Basic Mobility (PT)  -AE               User Key  (r) = Recorded By, (t) = Taken By, (c) = Cosigned By      Initials Name Provider Type    AE Louie Sanchez, PT Physical Therapist    Racheal Lindo, RN Registered Nurse                                 Physical Therapy Education       Title: PT OT SLP Therapies (In Progress)       Topic: Physical Therapy (In Progress)       Point: Mobility training (In Progress)       Learning Progress Summary             Patient Acceptance, E, NR by AE at 10/11/2024 1111    Acceptance, E,D, NR by AB at 10/7/2024 1424                         Point: Home exercise program (Not Started)       Learner Progress:  Not documented in this visit.              Point: Body mechanics (In Progress)       Learning Progress Summary             Patient Acceptance, E, NR by AE at 10/11/2024 1111    Acceptance, E,D, NR by AB at 10/7/2024 1424                         Point: Precautions (In Progress)       Learning Progress Summary             Patient Acceptance, E, NR by AE at 10/11/2024 1111    Acceptance, E,D, NR by AB at 10/7/2024 1424                                         User Key       Initials Effective Dates Name Provider Type Discipline    AE 09/21/21 -  Louie Sanchez, PT Physical Therapist PT    AB 09/22/22 -  Nyasia Herrera, PT Physical Therapist PT                  PT Recommendation and Plan     Plan of Care Reviewed With: patient  Progress:  improving  Outcome Evaluation: Pt presents with decreased functional independence and decreased safety awareness/balance. Pt ambulated 75ft with CGA-min A and RW for support. Continue to progress per pt tolerance.     Time Calculation:         PT Charges       Row Name 10/11/24 1155             Time Calculation    Start Time 1111  -AE      PT Received On 10/11/24  -AE      PT Goal Re-Cert Due Date 10/17/24  -AE         Timed Charges    73869 - PT Therapeutic Activity Minutes 19  -AE         Total Minutes    Timed Charges Total Minutes 19  -AE       Total Minutes 19  -AE                User Key  (r) = Recorded By, (t) = Taken By, (c) = Cosigned By      Initials Name Provider Type    AE Louie Sanchez PT Physical Therapist                  Therapy Charges for Today       Code Description Service Date Service Provider Modifiers Qty    30406390074 HC PT THERAPEUTIC ACT EA 15 MIN 10/11/2024 Louie Sanchez PT GP 1            PT G-Codes  Outcome Measure Options: AM-PAC 6 Clicks Basic Mobility (PT)  AM-PAC 6 Clicks Score (PT): 18  PT Discharge Summary  Anticipated Discharge Disposition (PT): inpatient rehabilitation facility    Louie Sanchez PT  10/11/2024

## 2024-10-11 NOTE — CASE MANAGEMENT/SOCIAL WORK
Continued Stay Note  [facility]     Patient Name: Marycruz Funez  MRN: [id number]  Today's Date: 10/11/2024    Admit Date: 10/6/2024    Plan: Signature in [place]   Discharge Plan       Row Name 10/11/24 1330       Plan    Plan Signature in [place]    Plan Comments Patient's plan is a skilled bed at [facility] in [place] PENDING insurance.  We will initiate precert with her insurance today, 10/11.  Updated patient's son by phone.  CM will continue to follow.    Final Discharge Disposition Code 03 - skilled nursing facility (SNF)                   Discharge Codes    No documentation.                 Expected Discharge Date and Time       Expected Discharge Date Expected Discharge Time    Oct 14, 2024               Nicolasa Branham RN

## 2024-10-11 NOTE — NURSING NOTE
Pt's BP began to range in systolics of 80, MAP's ranging in high 50s low 60s around 4am. Called nurse prac, and order placed to give 125ml nacl bolus. After BP recheck, it increased to 127/62 MAP 85. Patient's VSS.

## 2024-10-11 NOTE — PLAN OF CARE
Problem: Adult Inpatient Plan of Care  Goal: Plan of Care Review  Outcome: Progressing  Goal: Patient-Specific Goal (Individualized)  Outcome: Progressing  Goal: Absence of Hospital-Acquired Illness or Injury  Outcome: Progressing  Intervention: Identify and Manage Fall Risk  Recent Flowsheet Documentation  Taken 10/11/2024 0200 by Maxine Luevano RN  Safety Promotion/Fall Prevention:   activity supervised   assistive device/personal items within reach   clutter free environment maintained   fall prevention program maintained   lighting adjusted   nonskid shoes/slippers when out of bed   room organization consistent   safety round/check completed  Taken 10/11/2024 0000 by Maxine Luevano RN  Safety Promotion/Fall Prevention:   activity supervised   assistive device/personal items within reach   clutter free environment maintained   fall prevention program maintained   lighting adjusted   nonskid shoes/slippers when out of bed   room organization consistent   safety round/check completed  Taken 10/10/2024 2200 by Maxine Luevano RN  Safety Promotion/Fall Prevention:   activity supervised   assistive device/personal items within reach   clutter free environment maintained   fall prevention program maintained   lighting adjusted   nonskid shoes/slippers when out of bed   room organization consistent   safety round/check completed  Taken 10/10/2024 2000 by Maxine Luevano RN  Safety Promotion/Fall Prevention:   activity supervised   assistive device/personal items within reach   clutter free environment maintained   fall prevention program maintained   lighting adjusted   nonskid shoes/slippers when out of bed   safety round/check completed   room organization consistent  Intervention: Prevent Skin Injury  Recent Flowsheet Documentation  Taken 10/11/2024 0200 by Maxine Luevano, RN  Body Position:   position changed independently   weight shifting   supine  Skin Protection:   adhesive use limited    transparent dressing maintained   tubing/devices free from skin contact  Taken 10/11/2024 0000 by Maxine Luevano RN  Body Position:   position changed independently   tilted   right  Skin Protection:   adhesive use limited   tubing/devices free from skin contact   transparent dressing maintained  Taken 10/10/2024 2200 by Maxine Luevano RN  Body Position:   position changed independently   side-lying   turned   left  Skin Protection:   adhesive use limited   transparent dressing maintained   tubing/devices free from skin contact  Taken 10/10/2024 2000 by Maxine Luevano RN  Body Position:   position changed independently   sitting up in bed  Skin Protection:   adhesive use limited   transparent dressing maintained   tubing/devices free from skin contact  Intervention: Prevent and Manage VTE (Venous Thromboembolism) Risk  Recent Flowsheet Documentation  Taken 10/11/2024 0200 by Maxine Luevano RN  Activity Management: activity encouraged  Taken 10/11/2024 0000 by Maxine Luevano RN  Activity Management: activity encouraged  Taken 10/10/2024 2200 by Maxine Luevano RN  Activity Management: activity encouraged  VTE Prevention/Management:   bilateral   sequential compression devices off   patient refused intervention  Range of Motion: active ROM (range of motion) encouraged  Taken 10/10/2024 2000 by Maxine Luevano RN  Activity Management: activity encouraged  Intervention: Prevent Infection  Recent Flowsheet Documentation  Taken 10/11/2024 0200 by Maxine Luevano RN  Infection Prevention:   cohorting utilized   environmental surveillance performed   equipment surfaces disinfected   hand hygiene promoted   rest/sleep promoted  Taken 10/11/2024 0000 by Maxine Luevano RN  Infection Prevention:   cohorting utilized   equipment surfaces disinfected   environmental surveillance performed   hand hygiene promoted   rest/sleep promoted  Taken 10/10/2024 2200 by Maxine Luevano RN  Infection Prevention:    cohorting utilized   rest/sleep promoted   hand hygiene promoted   equipment surfaces disinfected   environmental surveillance performed  Taken 10/10/2024 2000 by Maxine Luevano RN  Infection Prevention:   cohorting utilized   environmental surveillance performed   equipment surfaces disinfected   hand hygiene promoted   rest/sleep promoted  Goal: Optimal Comfort and Wellbeing  Outcome: Progressing  Intervention: Provide Person-Centered Care  Recent Flowsheet Documentation  Taken 10/10/2024 2200 by Maxine Luevano RN  Trust Relationship/Rapport:   care explained   choices provided   emotional support provided   empathic listening provided   questions answered   questions encouraged   reassurance provided   thoughts/feelings acknowledged  Goal: Readiness for Transition of Care  Outcome: Progressing  Goal: Plan of Care Review  Outcome: Progressing  Goal: Patient-Specific Goal (Individualized)  Outcome: Progressing  Goal: Absence of Hospital-Acquired Illness or Injury  Outcome: Progressing  Intervention: Identify and Manage Fall Risk  Recent Flowsheet Documentation  Taken 10/11/2024 0200 by Maxine Luevano RN  Safety Promotion/Fall Prevention:   activity supervised   assistive device/personal items within reach   clutter free environment maintained   fall prevention program maintained   lighting adjusted   nonskid shoes/slippers when out of bed   room organization consistent   safety round/check completed  Taken 10/11/2024 0000 by Maxine Luevano RN  Safety Promotion/Fall Prevention:   activity supervised   assistive device/personal items within reach   clutter free environment maintained   fall prevention program maintained   lighting adjusted   nonskid shoes/slippers when out of bed   room organization consistent   safety round/check completed  Taken 10/10/2024 2200 by Maxine Luevano RN  Safety Promotion/Fall Prevention:   activity supervised   assistive device/personal items within reach   clutter free  environment maintained   fall prevention program maintained   lighting adjusted   nonskid shoes/slippers when out of bed   room organization consistent   safety round/check completed  Taken 10/10/2024 2000 by Maxine Luevano RN  Safety Promotion/Fall Prevention:   activity supervised   assistive device/personal items within reach   clutter free environment maintained   fall prevention program maintained   lighting adjusted   nonskid shoes/slippers when out of bed   safety round/check completed   room organization consistent  Intervention: Prevent Skin Injury  Recent Flowsheet Documentation  Taken 10/11/2024 0200 by Maxine Luevano RN  Body Position:   position changed independently   weight shifting   supine  Skin Protection:   adhesive use limited   transparent dressing maintained   tubing/devices free from skin contact  Taken 10/11/2024 0000 by Maxine Luevano RN  Body Position:   position changed independently   tilted   right  Skin Protection:   adhesive use limited   tubing/devices free from skin contact   transparent dressing maintained  Taken 10/10/2024 2200 by Maxine Luevano RN  Body Position:   position changed independently   side-lying   turned   left  Skin Protection:   adhesive use limited   transparent dressing maintained   tubing/devices free from skin contact  Taken 10/10/2024 2000 by Maxine Luevano RN  Body Position:   position changed independently   sitting up in bed  Skin Protection:   adhesive use limited   transparent dressing maintained   tubing/devices free from skin contact  Intervention: Prevent and Manage VTE (Venous Thromboembolism) Risk  Recent Flowsheet Documentation  Taken 10/11/2024 0200 by Maxine Luevano RN  Activity Management: activity encouraged  Taken 10/11/2024 0000 by Maxine Luevano RN  Activity Management: activity encouraged  Taken 10/10/2024 2200 by Maxine Luevano RN  Activity Management: activity encouraged  VTE Prevention/Management:   bilateral    sequential compression devices off   patient refused intervention  Range of Motion: active ROM (range of motion) encouraged  Taken 10/10/2024 2000 by Maxine Luevano RN  Activity Management: activity encouraged  Intervention: Prevent Infection  Recent Flowsheet Documentation  Taken 10/11/2024 0200 by Maxine Luevano RN  Infection Prevention:   cohorting utilized   environmental surveillance performed   equipment surfaces disinfected   hand hygiene promoted   rest/sleep promoted  Taken 10/11/2024 0000 by Maxine Luevano RN  Infection Prevention:   cohorting utilized   equipment surfaces disinfected   environmental surveillance performed   hand hygiene promoted   rest/sleep promoted  Taken 10/10/2024 2200 by Maxine Luevano RN  Infection Prevention:   cohorting utilized   rest/sleep promoted   hand hygiene promoted   equipment surfaces disinfected   environmental surveillance performed  Taken 10/10/2024 2000 by Maxine Luevano RN  Infection Prevention:   cohorting utilized   environmental surveillance performed   equipment surfaces disinfected   hand hygiene promoted   rest/sleep promoted  Goal: Optimal Comfort and Wellbeing  Outcome: Progressing  Intervention: Provide Person-Centered Care  Recent Flowsheet Documentation  Taken 10/10/2024 2200 by Maxine Luevano RN  Trust Relationship/Rapport:   care explained   choices provided   emotional support provided   empathic listening provided   questions answered   questions encouraged   reassurance provided   thoughts/feelings acknowledged  Goal: Readiness for Transition of Care  Outcome: Progressing     Problem: Fall Injury Risk  Goal: Absence of Fall and Fall-Related Injury  Outcome: Progressing  Intervention: Identify and Manage Contributors  Recent Flowsheet Documentation  Taken 10/11/2024 0200 by Maxine Luevano RN  Self-Care Promotion: independence encouraged  Taken 10/11/2024 0000 by Maxine Luevano RN  Self-Care Promotion: independence  encouraged  Taken 10/10/2024 2200 by Maxine Luevano RN  Medication Review/Management: medications reviewed  Self-Care Promotion: independence encouraged  Taken 10/10/2024 2000 by Maxine Luevano RN  Self-Care Promotion: independence encouraged  Intervention: Promote Injury-Free Environment  Recent Flowsheet Documentation  Taken 10/11/2024 0200 by Maxine Luevano RN  Safety Promotion/Fall Prevention:   activity supervised   assistive device/personal items within reach   clutter free environment maintained   fall prevention program maintained   lighting adjusted   nonskid shoes/slippers when out of bed   room organization consistent   safety round/check completed  Taken 10/11/2024 0000 by Maxine Luevano RN  Safety Promotion/Fall Prevention:   activity supervised   assistive device/personal items within reach   clutter free environment maintained   fall prevention program maintained   lighting adjusted   nonskid shoes/slippers when out of bed   room organization consistent   safety round/check completed  Taken 10/10/2024 2200 by Maxine Luevano RN  Safety Promotion/Fall Prevention:   activity supervised   assistive device/personal items within reach   clutter free environment maintained   fall prevention program maintained   lighting adjusted   nonskid shoes/slippers when out of bed   room organization consistent   safety round/check completed  Taken 10/10/2024 2000 by Maxine Luevano RN  Safety Promotion/Fall Prevention:   activity supervised   assistive device/personal items within reach   clutter free environment maintained   fall prevention program maintained   lighting adjusted   nonskid shoes/slippers when out of bed   safety round/check completed   room organization consistent     Problem: UTI (Urinary Tract Infection)  Goal: Improved Infection Symptoms  Outcome: Progressing  Intervention: Facilitate Optimal Urinary Elimination  Recent Flowsheet Documentation  Taken 10/10/2024 2200 by Bassem  Maxine, RN  Urinary Elimination Promotion: absorbent pad/diaper use encouraged  Intervention: Prevent Infection Progression  Recent Flowsheet Documentation  Taken 10/11/2024 0200 by Maxine Luevano RN  Infection Management: aseptic technique maintained  Taken 10/11/2024 0000 by Maxine Luevano RN  Infection Management: aseptic technique maintained  Taken 10/10/2024 2200 by Maxine Luevano RN  Infection Management: aseptic technique maintained  Taken 10/10/2024 2000 by Maxine Luevano RN  Infection Management: aseptic technique maintained     Problem: Confusion Acute  Goal: Optimal Cognitive Function  Outcome: Progressing  Intervention: Minimize Contributing Factors  Recent Flowsheet Documentation  Taken 10/11/2024 0200 by Maxine Luevano RN  Sensory Stimulation Regulation:   care clustered   lighting decreased  Reorientation Measures:   calendar in view   clock in view  Environment Familiarity/Consistency: daily routine followed  Taken 10/11/2024 0000 by Maxine Luevano RN  Sensory Stimulation Regulation:   care clustered   lighting decreased  Reorientation Measures:   calendar in view   clock in view  Environment Familiarity/Consistency: daily routine followed  Taken 10/10/2024 2200 by Maxine Luevano RN  Sensory Stimulation Regulation:   lighting decreased   care clustered   television on  Reorientation Measures:   calendar in view   clock in view  Environment Familiarity/Consistency: daily routine followed  Taken 10/10/2024 2000 by Maxine Luevano RN  Sensory Stimulation Regulation:   lighting decreased   care clustered  Reorientation Measures:   calendar in view   clock in view  Environment Familiarity/Consistency: daily routine followed     Problem: Skin Injury Risk Increased  Goal: Skin Health and Integrity  Outcome: Progressing  Intervention: Optimize Skin Protection  Recent Flowsheet Documentation  Taken 10/11/2024 0200 by Maxine Luevano RN  Pressure Reduction Techniques:   frequent  weight shift encouraged   weight shift assistance provided   pressure points protected  Head of Bed (HOB) Positioning: [facility] elevated  Pressure Reduction Devices:   pressure-redistributing mattress utilized   positioning supports utilized  Skin Protection:   adhesive use limited   transparent dressing maintained   tubing/devices free from skin contact  Taken 10/11/2024 0000 by Maxine Luevano RN  Pressure Reduction Techniques:   frequent weight shift encouraged   weight shift assistance provided   pressure points protected  Head of Bed (HOB) Positioning: [facility] elevated  Pressure Reduction Devices:   pressure-redistributing mattress utilized   positioning supports utilized  Skin Protection:   adhesive use limited   tubing/devices free from skin contact   transparent dressing maintained  Taken 10/10/2024 2200 by Maxine Luevano RN  Pressure Reduction Techniques:   frequent weight shift encouraged   weight shift assistance provided   heels elevated off bed   pressure points protected  Head of Bed (HOB) Positioning: [facility] elevated  Pressure Reduction Devices:   pressure-redistributing mattress utilized   positioning supports utilized  Skin Protection:   adhesive use limited   transparent dressing maintained   tubing/devices free from skin contact  Taken 10/10/2024 2000 by Maxine Luevano RN  Pressure Reduction Techniques:   frequent weight shift encouraged   weight shift assistance provided   pressure points protected  Head of Bed (HOB) Positioning: [facility] elevated  Pressure Reduction Devices:   pressure-redistributing mattress utilized   positioning supports utilized  Skin Protection:   adhesive use limited   transparent dressing maintained   tubing/devices free from skin contact   Goal Outcome Evaluation:               10/10/24- Pt remains disoriented to situation, but AOX everything else. Pt has been well rested throughout shift and has not tried to get out of bed anytime tonight. RA. Non tele. VSS. Family at bedside  during beginning of shift, did not stay overnight. Plan of care ongoing.

## 2024-10-11 NOTE — PLAN OF CARE
Goal Outcome Evaluation:  Plan of Care Reviewed With: patient        Progress: improving  Outcome Evaluation: OT eval complete. Pt presents w/ generalized weakness, balance deficits, and decreased safety awareness limiting functional independence from baseline and making pt a high fall risk. Recommend cont skilled IPOT POC to promote return to PLOF. Recommend pt DC to IP rehab.      Anticipated Discharge Disposition (OT): inpatient rehabilitation facility

## 2024-10-11 NOTE — PROGRESS NOTES
[facility] Medicine Services  PROGRESS NOTE    Patient Name: Marycruz Funez  : 1940  MRN: [id number]    Date of Admission: 10/6/2024  Primary Care Physician: Frandy Kuhn MD    Subjective     CC: f/u dementia    HPI:  Patient sitting up in bed still slightly confused, but knows her name, 's name and son's name.  Explained that we are waiting on placement to rehab and she acted like she that was planned.  No adverse events overnight.  No family in the room.    Objective     Vital Signs:   Temp:  [97.4 °F (36.3 °C)-98.3 °F (36.8 °C)] 97.9 °F (36.6 °C)  Heart Rate:  [72-75] 75  Resp:  [14-18] 18  BP: ()/(49-67) 96/55     Physical Exam:  Constitutional: Alert, frail elderly chronically ill-appearing female sitting up in bed.  ENT: Pink, moist mucous membranes   Respiratory: Nonlabored, symmetrical chest expansion, CTAB, RA  Cardiovascular: RRR, no M/R/G  Gastrointestinal: Soft, NT, ND +BS  Musculoskeletal: RIVERA; no LE edema bilaterally  Neurologic: Confused, strength symmetric in all extremities, follows all commands,speech clear  Skin: No rashes on exposed skin  Psychiatric: Pleasant and cooperative; normal affect    Results Reviewed:  LAB RESULTS:      Lab 10/07/24  0811 10/06/24  2253 10/06/24  1942   WBC 10.53  --  9.24   HEMOGLOBIN 12.8  --  14.4   HEMATOCRIT 40.4  --  45.0   PLATELETS 306  --  330   NEUTROS ABS  --   --  7.35*   IMMATURE GRANS (ABS)  --   --  0.03   LYMPHS ABS  --   --  0.93   MONOS ABS  --   --  0.66   EOS ABS  --   --  0.17   MCV 87.3  --  86.4   LACTATE  --  1.1 3.5*   HSTROP T  --   --  19*         Lab 10/10/24  1156 10/07/24  0811 10/06/24  1942   SODIUM 136 138 138   POTASSIUM 4.5 3.4* 4.1   CHLORIDE 103 101 102   CO2 22.0 21.0* 19.0*   ANION GAP 11.0 16.0* 17.0*   BUN 14 29* 26*   CREATININE 0.86 1.44* 1.71*   EGFR 66.7 35.9* 29.2*   GLUCOSE 102* 75 102*   CALCIUM 9.1 9.4 9.9   MAGNESIUM 2.1 2.1  --    TSH  --   --  1.480         Lab  10/06/24  1942   TOTAL PROTEIN 7.3   ALBUMIN 4.6   GLOBULIN 2.7   ALT (SGPT) 24   AST (SGOT) 41*   BILIRUBIN 0.9   ALK PHOS 73         Lab 10/06/24  1942   PROBNP 195.9   HSTROP T 19*             Lab 10/07/24  0811   VITAMIN B 12 441         Brief Urine Lab Results  (Last result in the past 365 days)        Color   Clarity   Blood   Leuk Est   Nitrite   Protein   CREAT   Urine HCG        10/06/24 2044 Yellow   Clear   Negative   Negative   Negative   30 mg/dL (1+)                   Microbiology Results Abnormal       Procedure Component Value - Date/Time    Blood Culture - Blood, Arm, Left [[id number]]  (Normal) Collected: 10/1940    Lab Status: Preliminary result Specimen: Blood from Arm, Left Updated: 10/10/24 2000     Blood Culture No growth at 4 days    Blood Culture - Blood, Arm, Right [[id number]]  (Normal) Collected: 10/06/24 1943    Lab Status: Preliminary result Specimen: Blood from Arm, Right Updated: 10/10/24 2000     Blood Culture No growth at 4 days          No radiology results from the last 24 hrs    Current medications:  Scheduled Meds:aspirin, 81 mg, Oral, Daily  atorvastatin, 20 mg, Oral, Daily  b complex-vitamin c-folic acid, 1 tablet, Oral, Daily  donepezil, 10 mg, Oral, Nightly  heparin (porcine), 5,000 Units, Subcutaneous, Q8H  melatonin, 5 mg, Oral, Nightly  PARoxetine, 40 mg, Oral, Daily  polyethylene glycol, 17 g, Oral, Daily  primidone, 25 mg, Oral, Q12H  QUEtiapine, 50 mg, Oral, Nightly  senna-docusate sodium, 2 tablet, Oral, Nightly  sodium chloride, 10 mL, Intravenous, Q12H  thiamine, 100 mg, Oral, Daily  cholecalciferol, 5,000 Units, Oral, Daily      Continuous Infusions:   PRN Meds:.  acetaminophen **OR** acetaminophen **OR** acetaminophen    [DISCONTINUED] senna-docusate sodium **AND** [DISCONTINUED] polyethylene glycol **AND** bisacodyl **AND** bisacodyl    Calcium Replacement - Follow Nurse / BPA Driven Protocol    Magnesium Standard Dose Replacement - Follow Nurse / BPA Driven  Protocol    Phosphorus Replacement - Follow Nurse / BPA Driven Protocol    Potassium Replacement - Follow Nurse / BPA Driven Protocol    [COMPLETED] Insert Peripheral IV **AND** sodium chloride    sodium chloride    Assessment & Plan     Active Hospital Problems    Diagnosis  POA    **AMS (altered mental status) [R41.82]  Yes    Severe malnutrition [E43]  Yes    Gastro-esophageal reflux disease with esophagitis [K21.00]  Yes    Sensorineural hearing loss (SNHL) of both ears [H90.3]  Yes    Dysphagia [R13.10]  Yes      Resolved Hospital Problems   No resolved problems to display.     Brief Hospital Course to date:  Marycruz Funez is a 84 y.o. female w h/o CKDIIIb, esophageal dysphagia (data deficit), cognitive impairment (mild in 2022) + tremor who presented w concern for hallucinations, wandering, erratic driving. On admission, hallucinations and hyperactivity, UDS + benzo without explanation (patient denies, no recent scripts)    These problems are new to me today    This patient's problems and plans were partially entered by my partner and updated as appropriate by me 10/11/24.    Worsening mental decline, acutely x weeks, on baseline memory impairment  - Medication adjustments per neurology  - My partner and neurology did not feel patient demonstrated capacity to make medical decisions. Son involved and at bedside  - CM following for disposition.  Pending SNF. Would benefit from memory care facility.  -- Seems slightly more coherent today.    Essential tremor - improved on primidone    Hypokalemia  -- Replaced  -- Monitor labs periodically     CKDIIIB  -- Improved at last check.  Check periodically.    Esophageal dysphagia   - appears chronic complaint   --consider EGD here if persistent complaint vs OP    Severe malnutrition  -- Nutrition following    Expected Discharge Location and Transportation: SNF  Expected Discharge Expected Discharge Date: 10/14/2024; Expected Discharge Time:      VTE Prophylaxis:  Pharmacologic VTE prophylaxis orders are present.    AM-PAC 6 Clicks Score (PT): 12 (10/10/24 2200)    CODE STATUS:   Code Status and Medical Interventions: CPR (Attempt to Resuscitate); Full Support   Ordered at: 10/07/24 0138     Level Of Support Discussed With:    Health Care Surrogate     Code Status (Patient has no pulse and is not breathing):    CPR (Attempt to Resuscitate)     Medical Interventions (Patient has pulse or is breathing):    Full Support     PETER Clark  10/11/24

## 2024-10-11 NOTE — PLAN OF CARE
Goal Outcome Evaluation:  Plan of Care Reviewed With: patient        Progress: improving  Outcome Evaluation: Pt presents with decreased functional independence and decreased safety awareness/balance. Pt ambulated 75ft with CGA-min A and RW for support. Continue to progress per pt tolerance.      Anticipated Discharge Disposition (PT): inpatient rehabilitation facility

## 2024-10-11 NOTE — PROGRESS NOTES
Neurology       Patient Care Team:  Frandy Kuhn MD as PCP - General  Frandy Kuhn MD as PCP - Family Medicine    Chief complaint: Awaiting disposition    History: Patient doing well.    She is calm and cooperative.    Disposition is pending      History reviewed. No pertinent past medical history.    Vital Signs   Vitals:    10/11/24 0405 10/11/24 0435 10/11/24 0715 10/11/24 1110   BP: (!) 83/49 96/55 118/62 106/54   BP Location: Left arm  Right arm Right arm   Patient Position: Lying  Lying Lying   Pulse:       Resp: 18  18 8   Temp: 97.9 °F (36.6 °C)  97.8 °F (36.6 °C) 97.9 °F (36.6 °C)   TempSrc: Oral  Oral Oral   SpO2:       Weight:       Height:           Physical Exam:   General: Awake and alert              Neuro: Oriented to person and place.    Speech is normal.    Tremor is unnoticeable    Results Review:  Reviewed  Results from last 7 days   Lab Units 10/11/24  1100   WBC 10*3/mm3 5.62   HEMOGLOBIN g/dL 12.7   HEMATOCRIT % 39.2   PLATELETS 10*3/mm3 243     Results from last 7 days   Lab Units 10/10/24  1156 10/07/24  0811 10/06/24  1942   SODIUM mmol/L 136 138 138   POTASSIUM mmol/L 4.5 3.4* 4.1   CHLORIDE mmol/L 103 101 102   CO2 mmol/L 22.0 21.0* 19.0*   BUN mg/dL 14 29* 26*   CREATININE mg/dL 0.86 1.44* 1.71*   CALCIUM mg/dL 9.1 9.4 9.9   BILIRUBIN mg/dL  --   --  0.9   ALK PHOS U/L  --   --  73   ALT (SGPT) U/L  --   --  24   AST (SGOT) U/L  --   --  41*   GLUCOSE mg/dL 102* 75 102*       Imaging Results (Last 24 Hours)       ** No results found for the last 24 hours. **            Assessment:  Dementia    Sleep deprivation syndrome resolved.    Essential tremor marked improvement    Plan:  Okay to transfer anytime    Comment:  Will see the patient on request         I discussed the patients findings and my recommendations with patient and nursing staff    William Rosenberg MD  10/11/24  11:43 EDT         2 = assistive person

## 2024-10-11 NOTE — THERAPY EVALUATION
Patient Name: Marycruz Funez  : 1940    MRN: [id number]                              Today's Date: 10/11/2024       Admit Date: 10/6/2024    Visit Dx:     ICD-10-CM ICD-9-CM   1. Altered mental status, unspecified altered mental status type  R41.82 780.97   2. Dehydration  E86.0 276.51   3. Mild renal insufficiency  N28.9 593.9   4. Recent urinary tract infection  Z87.440 V13.02   5. Dysphagia, unspecified type  R13.10 787.20     Patient Active Problem List   Diagnosis    AMS (altered mental status)    Dysphagia    Gastro-esophageal reflux disease with esophagitis    Sensorineural hearing loss (SNHL) of both ears    Severe malnutrition     History reviewed. No pertinent past medical history.  Past Surgical History:   Procedure Laterality Date    BREAST CYST ASPIRATION Left     HYSTERECTOMY        General Information       Row Name 10/11/24 1314          OT Time and Intention    Document Type evaluation  -CS     Mode of Treatment occupational therapy  -CS       Row Name 10/11/24 1314          General Information    Patient Profile Reviewed yes  -CS     Prior Level of Function independent:;all household mobility;ADL's  -CS     Existing Precautions/Restrictions fall  -CS     Barriers to Rehab medically complex  -CS       Row Name 10/11/24 1314          Living Environment    People in Home alone  -CS       Row Name 10/11/24 1314          Home Main Entrance    Number of Stairs, Main Entrance none  -CS       Row Name 10/11/24 1314          Stairs Within Home, Primary    Number of Stairs, Within Home, Primary none  -CS       Row Name 10/11/24 1314          Cognition    Orientation Status (Cognition) oriented x 4  -CS       Row Name 10/11/24 1314          Safety Issues, Functional Mobility    Impairments Affecting Function (Mobility) balance;endurance/activity tolerance;strength;cognition  -CS     Cognitive Impairments, Mobility Safety/Performance attention;insight into deficits/self-awareness;sequencing  abilities;awareness, need for assistance  -               User Key  (r) = Recorded By, (t) = Taken By, (c) = Cosigned By      Initials Name Provider Type    CS Cally Bell OT Occupational Therapist                     Mobility/ADL's       Row Name 10/11/24 1315          Bed Mobility    Bed Mobility supine-sit  -     Supine-Sit [place] (Bed Mobility) contact guard;verbal cues  -     Assistive Device (Bed Mobility) head of bed elevated;bed rails  -       Row Name 10/11/24 1315          Transfers    Transfers sit-stand transfer;stand-sit transfer;toilet transfer  -       Row Name 10/11/24 1315          Sit-Stand Transfer    Sit-Stand [place] (Transfers) contact guard;verbal cues  -     Assistive Device (Sit-Stand Transfers) walker, front-wheeled  -       Row Name 10/11/24 1315          Stand-Sit Transfer    Stand-Sit [place] (Transfers) minimum assist (75% patient effort);verbal cues  -     Assistive Device (Stand-Sit Transfers) walker, front-wheeled  -       Row Name 10/11/24 1315          Toilet Transfer    Type (Toilet Transfer) stand-sit;sit-stand  -     [place] Level (Toilet Transfer) minimum assist (75% patient effort);verbal cues  -     Assistive Device (Toilet Transfer) walker, front-wheeled;grab bars/safety frame  -       Row Name 10/11/24 1315          Functional Mobility    Functional Mobility- Ind. Level minimum assist (75% patient effort);verbal cues required  -     Functional Mobility- Device walker, front-wheeled  -     Functional Mobility-Distance (Feet) --  to/from the bathroom  -       Row Name 10/11/24 1315          Activities of Daily Living    BADL Assessment/Intervention lower body dressing;toileting;grooming  -       Row Name 10/11/24 1315          Lower Body Dressing Assessment/Training    [place] Level (Lower Body Dressing) don;socks;minimum assist (75% patient effort)  -     Position (Lower Body Dressing) sitting up in bed  -        Row Name 10/11/24 1315          Toileting Assessment/Training    [place] Level (Toileting) adjust/manage clothing;minimum assist (75% patient effort);perform perineal hygiene;standby assist  -CS     Position (Toileting) supported sitting;supported standing  -CS       Row Name 10/11/24 1315          Grooming Assessment/Training    [place] Level (Grooming) hair care, combing/brushing;set up  -CS     Position (Grooming) edge of bed sitting  -CS               User Key  (r) = Recorded By, (t) = Taken By, (c) = Cosigned By      Initials Name Provider Type    CS Cally Bell OT Occupational Therapist                   Obj/Interventions       Row Name 10/11/24 1317          Sensory Assessment (Somatosensory)    Sensory Assessment (Somatosensory) UE sensation intact  -       Row Name 10/11/24 1317          Range of Motion Comprehensive    General Range of Motion bilateral upper extremity ROM WFL  -CS       Row Name 10/11/24 1317          Strength Comprehensive (MMT)    Comment, General Manual Muscle Testing (MMT) Assessment BUE grossly 3+/5  -CS       Row Name 10/11/24 1317          Balance    Balance Assessment sitting static balance;sitting dynamic balance;standing static balance;standing dynamic balance  -CS     Static Sitting Balance standby assist  -CS     Dynamic Sitting Balance standby assist  -CS     Position, Sitting Balance sitting edge of bed  -CS     Static Standing Balance contact guard  -CS     Dynamic Standing Balance minimal assist  -CS     Position/Device Used, Standing Balance supported;walker, rolling  -CS     Balance Interventions sitting;standing;static;dynamic;dynamic reaching;occupation based/functional task;weight shifting activity  -CS               User Key  (r) = Recorded By, (t) = Taken By, (c) = Cosigned By      Initials Name Provider Type    Cally Cody OT Occupational Therapist                   Goals/Plan       Row Name 10/11/24 1319          Transfer Goal 1 (OT)     Activity/Assistive Device (Transfer Goal 1, OT) sit-to-stand/stand-to-sit;toilet  -CS     [place] Level/Cues Needed (Transfer Goal 1, OT) standby assist  -CS     Time Frame (Transfer Goal 1, OT) long term goal (LTG);10 days  -CS     Progress/Outcome (Transfer Goal 1, OT) goal ongoing  -CS       Row Name 10/11/24 1319          Dressing Goal 1 (OT)    Activity/Device (Dressing Goal 1, OT) lower body dressing  -CS     [place]/Cues Needed (Dressing Goal 1, OT) standby assist  -CS     Time Frame (Dressing Goal 1, OT) long term goal (LTG);10 days  -CS     Strategies/Barriers (Dressing Goal 1, OT) don/doff socks w/ AAD  -CS     Progress/Outcome (Dressing Goal 1, OT) goal ongoing  -CS       Row Name 10/11/24 1319          Toileting Goal 1 (OT)    Activity/Device (Toileting Goal 1, OT) adjust/manage clothing;perform perineal hygiene  -CS     [place] Level/Cues Needed (Toileting Goal 1, OT) supervision required  -CS     Time Frame (Toileting Goal 1, OT) short term goal (STG);5 days  -CS     Progress/Outcome (Toileting Goal 1, OT) goal ongoing  -CS       Row Name 10/11/24 1319          Therapy Assessment/Plan (OT)    Planned Therapy Interventions (OT) activity tolerance training;adaptive equipment training;BADL retraining;functional balance retraining;occupation/activity based interventions;ROM/therapeutic exercise;strengthening exercise;transfer/mobility retraining;patient/caregiver education/training  -CS               User Key  (r) = Recorded By, (t) = Taken By, (c) = Cosigned By      Initials Name Provider Type    CS Cally Bell OT Occupational Therapist                   Clinical Impression       Row Name 10/11/24 1317          Pain Assessment    Pretreatment Pain Rating 0/10 - no pain  -CS     Posttreatment Pain Rating 0/10 - no pain  -CS       Row Name 10/11/24 1317          Plan of Care Review    Plan of Care Reviewed With patient  -CS     Progress improving  -CS     Outcome Evaluation OT thor  complete. Pt presents w/ generalized weakness, balance deficits, and decreased safety awareness limiting functional independence from baseline and making pt a high fall risk. Recommend cont skilled IPOT POC to promote return to PLOF. Recommend pt DC to IP rehab.  -CS       Row Name 10/11/24 1317          Therapy Assessment/Plan (OT)    Patient/Family Therapy Goal Statement (OT) Return to PLOF  -CS     Rehab Potential (OT) good, to achieve stated therapy goals  -CS     Criteria for Skilled Therapeutic Interventions Met (OT) yes;skilled treatment is necessary  -CS     Therapy Frequency (OT) daily  -CS     Predicted Duration of Therapy Intervention (OT) 10 days  -CS       Row Name 10/11/24 1317          Therapy Plan Review/Discharge Plan (OT)    Anticipated Discharge Disposition (OT) inpatient rehabilitation facility  -CS       Row Name 10/11/24 1317          Vital Signs    O2 Delivery Pre Treatment room air  -CS     O2 Delivery Intra Treatment room air  -CS     O2 Delivery Post Treatment room air  -CS     Pre Patient Position Supine  -CS     Intra Patient Position Standing  -CS     Post Patient Position Sitting  -CS       Row Name 10/11/24 1317          Positioning and Restraints    Pre-Treatment Position in bed  -CS     Post Treatment Position other  -CS     Other Position with PT  -CS               User Key  (r) = Recorded By, (t) = Taken By, (c) = Cosigned By      Initials Name Provider Type    CS Cally Bell, MARIO Occupational Therapist                   Outcome Measures       Row Name 10/11/24 1320          How much help from another is currently needed...    Putting on and taking off regular lower body clothing? 2  -CS     Bathing (including washing, rinsing, and drying) 2  -CS     Toileting (which includes using toilet bed pan or urinal) 2  -CS     Putting on and taking off regular upper body clothing 3  -CS     Taking care of personal grooming (such as brushing teeth) 3  -CS     Eating meals 4  -CS     AM-PAC  6 Clicks Score (OT) 16  -CS       Row Name 10/11/24 1154 10/11/24 0800       How much help from another person do you currently need...    Turning from your back to your side while in flat bed without using bedrails? 3  -AE 3  -JM    Moving from lying on back to sitting on the side of a flat bed without bedrails? 3  -AE 3  -JM    Moving to and from a bed to a chair (including a wheelchair)? 3  -AE 3  -JM    Standing up from a chair using your arms (e.g., wheelchair, bedside chair)? 3  -AE 3  -JM    Climbing 3-5 steps with a railing? 3  -AE 3  -JM    To walk in hospital room? 3  -AE 3  -JM    AM-PAC 6 Clicks Score (PT) 18  -AE 18  -JM    Highest Level of Mobility Goal 6 --> Walk 10 steps or more  -AE 6 --> Walk 10 steps or more  -JM      Row Name 10/11/24 1320 10/11/24 1154       Functional Assessment    Outcome Measure Options AM-PAC 6 Clicks Daily Activity (OT)  -CS AM-PAC 6 Clicks Basic Mobility (PT)  -AE              User Key  (r) = Recorded By, (t) = Taken By, (c) = Cosigned By      Initials Name Provider Type    Cally Cody OT Occupational Therapist    Louie Martinez, PT Physical Therapist    Racheal Lindo, RN Registered Nurse                    Occupational Therapy Education       Title: PT OT SLP Therapies (In Progress)       Topic: Occupational Therapy (In Progress)       Point: ADL training (In Progress)       Description:   Instruct learner(s) on proper safety adaptation and remediation techniques during self care or transfers.   Instruct in proper use of assistive devices.                  Learning Progress Summary             Patient Acceptance, E, NR by  at 10/11/2024 1321                         Point: Home exercise program (Not Started)       Description:   Instruct learner(s) on appropriate technique for monitoring, assisting and/or progressing therapeutic exercises/activities.                  Learner Progress:  Not documented in this visit.              Point: Precautions (In  Progress)       Description:   Instruct learner(s) on prescribed precautions during self-care and functional transfers.                  Learning Progress Summary             Patient Acceptance, E, NR by  at 10/11/2024 1321                         Point: Body mechanics (In Progress)       Description:   Instruct learner(s) on proper positioning and spine alignment during self-care, functional mobility activities and/or exercises.                  Learning Progress Summary             Patient Acceptance, E, NR by  at 10/11/2024 1321                                         User Key       Initials Effective Dates Name Provider Type Discipline     09/02/21 -  Cally Bell, MARIO Occupational Therapist OT                  OT Recommendation and Plan  Planned Therapy Interventions (OT): activity tolerance training, adaptive equipment training, BADL retraining, functional balance retraining, occupation/activity based interventions, ROM/therapeutic exercise, strengthening exercise, transfer/mobility retraining, patient/caregiver education/training  Therapy Frequency (OT): daily  Plan of Care Review  Plan of Care Reviewed With: patient  Progress: improving  Outcome Evaluation: OT eval complete. Pt presents w/ generalized weakness, balance deficits, and decreased safety awareness limiting functional independence from baseline and making pt a high fall risk. Recommend cont skilled IPOT POC to promote return to PLOF. Recommend pt DC to IP rehab.     Time Calculation:   Evaluation Complexity (OT)  Review Occupational Profile/Medical/Therapy History Complexity: expanded/moderate complexity  Assessment, Occupational Performance/Identification of Deficit Complexity: 5 or more performance deficits  Clinical Decision Making Complexity (OT): detailed assessment/moderate complexity  Overall Complexity of Evaluation (OT): moderate complexity     Time Calculation- OT       Row Name 10/11/24 1321             Time Calculation- OT    OT  Start Time 1105  -CS      OT Received On 10/11/24  -CS      OT Goal Re-Cert Due Date 10/21/24  -CS         Untimed Charges    OT Eval/Re-eval Minutes 46  -CS         Total Minutes    Untimed Charges Total Minutes 46  -CS       Total Minutes 46  -CS                User Key  (r) = Recorded By, (t) = Taken By, (c) = Cosigned By      Initials Name Provider Type    CS Cally Bell OT Occupational Therapist                  Therapy Charges for Today       Code Description Service Date Service Provider Modifiers Qty    [id number] HC OT EVAL MOD COMPLEXITY 4 10/11/2024 Cally Bell OT GO 1                 Cally Bell OT  10/11/2024

## 2024-10-11 NOTE — DISCHARGE PLACEMENT REQUEST
"Navdeep Funez (84 y.o. Female)       Date of Birth   1940    Social Security Number       Address   [address] [place], [place] [address]    Home Phone   [phone]    MRN   [id number]       Restorationism   Baptism    Marital Status                               Admission Date   10/6/24    Admission Type   Emergency    Admitting Provider   Martha Hairston II, DO    Attending Provider   Martha Hairston II, DO    Department, Room/Bed   [facility] 3E, S333/1       Discharge Date       Discharge Disposition       Discharge Destination                                 Attending Provider: Martha Hairston II, DO    Allergies: No Known Allergies    Isolation: None   Infection: None   Code Status: CPR    Ht: 167.6 cm (66\")   Wt: 46.3 kg (102 lb)    Admission Cmt: None   Principal Problem: AMS (altered mental status) [R41.82]                   Active Insurance as of 10/6/2024       Primary Coverage       Payor Plan Insurance Group Employer/Plan Group    HUMANA MEDICARE REPLACEMENT HUMANA MED ADV PPO [id number]       Payor Plan Address Payor Plan Phone Number Payor Plan Fax Number Effective Dates    [address] [address] [phone]  2024 - None Entered    [facility] [address]         Subscriber Name Subscriber Birth Date Member ID       NAVDEEP FUNEZ 1940 [id number]                     Emergency Contacts        (Rel.) Home Phone Work Phone Mobile Phone    Beatriz Rodriguez (Daughter) -- -- [phone]    MELISSA CASTRO (Son) -- -- [phone]                 History & Physical        Roque Reese MD at 10/07/24 [address] [place] [address] Medicine Services  HISTORY AND PHYSICAL    Patient Name: Navdeep Funez  : 1940  MRN: [id number]  Primary Care Physician: Frandy Kuhn MD  Date of admission: 10/6/2024      Subjective  Subjective     Chief Complaint:  Altered mental status    HPI:  Navdeep Funez is a 84 y.o. female with past " medical history of unspecified dementia, chronic tremor, CKD 4, dysphagia who is presenting with persistent changes in her mental status.  At her baseline, she lives alone and over the past several years has had minor changes in her short-term memory, she has also had a persistent tremor that was worsening.  Acutely over the last week she has been having hallucinations, seeing people and things that are not there.  She was also found to be wandering in her neighborhood once over the last week.  1 month ago she was pulled over for driving in her car erratically and her home zolpidem was stopped.  She was seen in the ED 9/28 for this and treated for a UTI with a cephalosporin and then Bactrim but has not improved.  She has had no other significant changes in her medications or other symptoms beyond the above.  Family is concerned that she can no longer take care of herself at home and may need placement.  She does not drink alcohol to the best of family's knowledge and they did not report any in the house.  She did seem to neurologist in 2022 for dementia evaluation but had declined Aricept at the time.      Personal History     History reviewed. No pertinent past medical history.        Past Surgical History:   Procedure Laterality Date    BREAST CYST ASPIRATION Left 2002    HYSTERECTOMY  1990       Family History: family history is not on file.     Social History:  reports that she has never smoked. She has never used smokeless tobacco. She reports that she does not drink alcohol and does not use drugs.  Social History     Social History Narrative    Not on file       Medications:  Available home medication information reviewed.       No Known Allergies    Objective  Objective     Vital Signs:   Temp:  [98.6 °F (37 °C)] 98.6 °F (37 °C)  Heart Rate:  [] 73  Resp:  [18] 18  BP: (108-138)/() 127/64       Physical Exam   She is awake and alert, oriented to self and year but not place  Speech is overall clear  but she does not have her dentures in  She does occasionally talk to someone who is not in the room  States her daughter and son in the room are her daughter and son-in-law  Concentration appears to be intact, can name days of the week backwards  Can name 2 out of 3 objects correctly: Pen, cell phone, but calls stethoscope a pen as well  Extract movements intact, pupils equal and reactive to light  Facial expression intact  Tongue at midline  Neck flexion intact  No cervical adenopathy or thyromegaly  Heart regular rate and rhythm  Lungs are clear to auscultation bilaterally  Abdomen nondistended, nontender  There is resting tremor present that is significant in the bilateral upper and lower extremities as well as jaw movements, is apparent on extension as well.  No focal weakness or sensory loss appreciated.    Result Review:  I have personally reviewed the results from the time of this admission to 10/7/2024 01:45 EDT and agree with these findings:  [x]  Laboratory list / accordion  [x]  Microbiology  [x]  Radiology  [x]  EKG/Telemetry   []  Cardiology/Vascular   []  Pathology  [x]  Old records  []  Other:  Most notable findings include: See assessment and plan      LAB RESULTS:      Lab 10/06/24  2253 10/06/24  1942   WBC  --  9.24   HEMOGLOBIN  --  14.4   HEMATOCRIT  --  45.0   PLATELETS  --  330   NEUTROS ABS  --  7.35*   IMMATURE GRANS (ABS)  --  0.03   LYMPHS ABS  --  0.93   MONOS ABS  --  0.66   EOS ABS  --  0.17   MCV  --  86.4   LACTATE 1.1 3.5*         Lab 10/06/24  1942   SODIUM 138   POTASSIUM 4.1   CHLORIDE 102   CO2 19.0*   ANION GAP 17.0*   BUN 26*   CREATININE 1.71*   EGFR 29.2*   GLUCOSE 102*   CALCIUM 9.9         Lab 10/06/24  1942   TOTAL PROTEIN 7.3   ALBUMIN 4.6   GLOBULIN 2.7   ALT (SGPT) 24   AST (SGOT) 41*   BILIRUBIN 0.9   ALK PHOS 73         Lab 10/06/24  1942   PROBNP 195.9   HSTROP T 19*                 UA          9/28/2024    20:26 10/6/2024    20:44   Urinalysis   Squamous Epithelial  Cells, UA 3-6  0-2    Specific Gravity, UA 1.029  1.033    Ketones, UA 15 mg/dL (1+)  Trace    Blood, UA Trace  Negative    Leukocytes, UA Moderate (2+)  Negative    Nitrite, UA Negative  Negative    RBC, UA 3-5  0-2    WBC, UA 6-10  3-5    Bacteria, UA 4+  None Seen        Microbiology Results (last 10 days)       Procedure Component Value - Date/Time    Urine Culture - Urine, Urine, Clean Catch [[id number]]  (Abnormal)  (Susceptibility) Collected: 09/28/24 2026    Lab Status: Final result Specimen: Urine, Clean Catch Updated: 10/01/24 1018     Urine Culture >100,000 CFU/mL Escherichia coli    Narrative:      Colonization of the urinary tract without infection is common. Treatment is discouraged unless the patient is symptomatic, pregnant, or undergoing an invasive urologic procedure.    Susceptibility        Escherichia coli      ZACKARY      Amikacin Susceptible      Amoxicillin + Clavulanate Susceptible      Ampicillin Resistant      Ampicillin + Sulbactam Intermediate      Cefazolin Susceptible      Cefepime Susceptible      Ceftazidime Susceptible      Ceftriaxone Susceptible      Gentamicin Resistant      Levofloxacin Resistant      Nitrofurantoin Susceptible      Piperacillin + Tazobactam Susceptible      Tobramycin Resistant      Trimethoprim + Sulfamethoxazole Susceptible                                   XR Chest 1 View    Result Date: 10/6/2024  XR CHEST 1 VW Date of Exam: 10/6/2024 9:30 PM EDT Indication: Fever, weakness, hypoxia Comparison: 9/28/2024. Findings: There are no airspace consolidations. No pleural fluid. No pneumothorax. The pulmonary vasculature appears within normal limits. The cardiac and mediastinal silhouette appear unremarkable. No acute osseous abnormality identified.     Impression: Impression: No acute cardiopulmonary process. Electronically Signed: Siria Mariee MD  10/6/2024 10:50 PM EDT  Workstation ID: [id number]    CT Head Without Contrast    Result Date: 10/6/2024  CT HEAD WO  CONTRAST Date of Exam: 10/6/2024 9:00 PM EDT Indication: ams. Comparison: 9/20/2024. Technique: Axial CT images were obtained of the head without contrast administration.  Automated exposure control and iterative construction methods were used. Findings: There is no evidence of hemorrhage. There is no mass effect or midline shift. There is no extracerebral collection. Ventricles are normal in size and configuration for patient's stated age.  Posterior fossa is within normal limits. Calvarium and skull base appear intact.   Visualized sinuses show no air fluid levels. Visualized orbits are unremarkable.     Impression: Impression: No acute intracranial process. Electronically Signed: Siria Mariee MD  10/6/2024 9:18 PM EDT  Workstation ID: [id number]         Assessment & Plan  Assessment & Plan       AMS (altered mental status)    Dysphagia    Gastro-esophageal reflux disease with esophagitis    Sensorineural hearing loss (SNHL) of both ears    Altered mental status manifested as hallucination   History of dementia, unspecified  Tremor  -Prior issues with memory impairment and tremor which has been gradually worsening.  Now present at rest as well.  New hallucinations over the past 1 to 2 weeks.  Inability to safely care for self at home.  Worsening despite treatment for UTI  -Overall unrevealing workup so far from today and 9/28. No acute findings on CTH.   -Recheck TSH, ethanol, UDS. Check B12, vit D  -Check for urinary retention  -Trial thiamine  -Neurology in am. There is family history of Parkinsons in son, I wonder if this could be Parkinsons or LBD and if she'd benefit from trial of sinemet    Acute kidney injury on CKD   Metabolic acidosis, elevated lactic acid  -Renal function currently impaired compared to baseline, history of essentially no reliable oral intake over the last week.  Received IV fluids with normalization of lactic acid.  Will encourage p.o. continue to monitor creatinine, output    Recent  UTI  -Was adequately treated, sensitive to the antibiotics given.  Hold further treatment    History of esophageal dysphagia  Adentulous  -Currently following with GI  -Does not currently have her dentures, will place on puréed diet until these can be obtained.  SLP thor    Will ask pharmacy for assistance with med rec. Holding home bupropion, effexor, remeron for now in case interacting. Will hold aricept for now as well as this was most recent med addition.    VTE Prophylaxis:  Pharmacologic VTE prophylaxis orders are present.          CODE STATUS:    Code Status and Medical Interventions: CPR (Attempt to Resuscitate); Full Support   Ordered at: 10/07/24 0138     Level Of Support Discussed With:    Health Care Surrogate     Code Status (Patient has no pulse and is not breathing):    CPR (Attempt to Resuscitate)     Medical Interventions (Patient has pulse or is breathing):    Full Support       Expected Discharge   Expected discharge date/ time has not been documented.     Roque Reese MD  10/07/24      Electronically signed by Roque Reese MD at 10/07/24 0206          Physician Progress Notes (most recent note)        William Rosenberg MD at 10/11/24 1142          Neurology       Patient Care Team:  Frandy Kuhn MD as PCP - General  Frandy Kuhn MD as PCP - Family Medicine    Chief complaint: Awaiting disposition    History: Patient doing well.    She is calm and cooperative.    Disposition is pending      History reviewed. No pertinent past medical history.    Vital Signs   Vitals:    10/11/24 0405 10/11/24 0435 10/11/24 0715 10/11/24 1110   BP: (!) 83/49 96/55 118/62 106/54   BP Location: Left arm  Right arm Right arm   Patient Position: Lying  Lying Lying   Pulse:       Resp: 18  18 8   Temp: 97.9 °F (36.6 °C)  97.8 °F (36.6 °C) 97.9 °F (36.6 °C)   TempSrc: Oral  Oral Oral   SpO2:       Weight:       Height:           Physical Exam:   General: Awake and alert              Neuro:  Oriented to person and place.    Speech is normal.    Tremor is unnoticeable    Results Review:  Reviewed  Results from last 7 days   Lab Units 10/11/24  1100   WBC 10*3/mm3 5.62   HEMOGLOBIN g/dL 12.7   HEMATOCRIT % 39.2   PLATELETS 10*3/mm3 243     Results from last 7 days   Lab Units 10/10/24  1156 10/07/24  0811 10/06/24  1942   SODIUM mmol/L 136 138 138   POTASSIUM mmol/L 4.5 3.4* 4.1   CHLORIDE mmol/L 103 101 102   CO2 mmol/L 22.0 21.0* 19.0*   BUN mg/dL 14 29* 26*   CREATININE mg/dL 0.86 1.44* 1.71*   CALCIUM mg/dL 9.1 9.4 9.9   BILIRUBIN mg/dL  --   --  0.9   ALK PHOS U/L  --   --  73   ALT (SGPT) U/L  --   --  24   AST (SGOT) U/L  --   --  41*   GLUCOSE mg/dL 102* 75 102*       Imaging Results (Last 24 Hours)       ** No results found for the last 24 hours. **            Assessment:  Dementia    Sleep deprivation syndrome resolved.    Essential tremor marked improvement    Plan:  Okay to transfer anytime    Comment:  Will see the patient on request         I discussed the patients findings and my recommendations with patient and nursing staff    William Rosenberg MD  10/11/24  11:43 EDT          Electronically signed by William Rosenberg MD at 10/11/24 1144          Physical Therapy Notes (most recent note)        Louie Sanchez, PT at 10/11/24 1111  Version 1 of 1         Patient Name: Marycruz Funez  : 1940    MRN: [id number]                              Today's Date: 10/11/2024       Admit Date: 10/6/2024    Visit Dx:     ICD-10-CM ICD-9-CM   1. Altered mental status, unspecified altered mental status type  R41.82 780.97   2. Dehydration  E86.0 276.51   3. Mild renal insufficiency  N28.9 593.9   4. Recent urinary tract infection  Z87.440 V13.02   5. Dysphagia, unspecified type  R13.10 787.20     Patient Active Problem List   Diagnosis    AMS (altered mental status)    Dysphagia    Gastro-esophageal reflux disease with esophagitis    Sensorineural hearing loss (SNHL) of both ears    Severe  malnutrition     History reviewed. No pertinent past medical history.  Past Surgical History:   Procedure Laterality Date    BREAST CYST ASPIRATION Left 2002    HYSTERECTOMY  1990      General Information       Row Name 10/11/24 1148          Physical Therapy Time and Intention    Document Type therapy note (daily note)  -AE     Mode of Treatment physical therapy  -AE       Row Name 10/11/24 1148          General Information    Patient Profile Reviewed yes  -AE     Existing Precautions/Restrictions fall  -AE     Barriers to Rehab medically complex  -AE       Row Name 10/11/24 1148          Cognition    Orientation Status (Cognition) oriented x 4  -AE       Row Name 10/11/24 1148          Safety Issues, Functional Mobility    Safety Issues Affecting Function (Mobility) awareness of need for assistance;insight into deficits/self-awareness;safety precaution awareness;safety precautions follow-through/compliance;sequencing abilities;judgment;problem-solving  -AE     Impairments Affecting Function (Mobility) balance;endurance/activity tolerance;strength  -AE               User Key  (r) = Recorded By, (t) = Taken By, (c) = Cosigned By      Initials Name Provider Type    AE Loiue Sanchez, PT Physical Therapist                   Mobility       Row Name 10/11/24 1149          Bed Mobility    Bed Mobility supine-sit  -AE     Supine-Sit Chase (Bed Mobility) contact guard  -AE     Assistive Device (Bed Mobility) head of bed elevated;bed rails  -AE     Comment, (Bed Mobility) No cues needed for bed mobility to sit at EOB. Good sequencing noted.  -AE       Row Name 10/11/24 1149          Transfers    Comment, (Transfers) VCs for hand placement and sequencing. Pt requires cues to improve safety awareness and reduce impulsiveness.  -AE       Row Name 10/11/24 1149          Sit-Stand Transfer    Sit-Stand Chase (Transfers) contact guard;verbal cues  -AE     Assistive Device (Sit-Stand Transfers) walker,  front-wheeled  -AE       Row Name 10/11/24 1149          Gait/Stairs (Locomotion)    [place] Level (Gait) minimum assist (75% patient effort);verbal cues  -AE     Assistive Device (Gait) walker, front-wheeled  -AE     Distance in Feet (Gait) 75  -AE     Deviations/Abnormal Patterns (Gait) bilateral deviations;base of support, narrow;stride length decreased;gait speed decreased  -AE     Comment, (Gait/Stairs) Pt demo step through gait pattern with slowed kandy and narrow LEELEE. Pt required CGA-min A at times for RW management and to improve safety awareness/balance. Further distance limited by fatigue.  -AE               User Key  (r) = Recorded By, (t) = Taken By, (c) = Cosigned By      Initials Name Provider Type    AE Louie Sanchez PT Physical Therapist                   Obj/Interventions       Row Name 10/11/24 1152          Balance    Balance Assessment sitting static balance;sitting dynamic balance;sit to stand dynamic balance;standing static balance;standing dynamic balance  -AE     Static Sitting Balance standby assist  -AE     Dynamic Sitting Balance contact guard  -AE     Position, Sitting Balance unsupported;sitting edge of bed;sitting in chair  -AE     Sit to Stand Dynamic Balance contact guard;verbal cues  -AE     Static Standing Balance contact guard  -AE     Dynamic Standing Balance minimal assist  -AE     Position/Device Used, Standing Balance supported;walker, front-wheeled  -AE               User Key  (r) = Recorded By, (t) = Taken By, (c) = Cosigned By      Initials Name Provider Type    AE Louie Sanchez, PT Physical Therapist                   Goals/Plan    No documentation.                  Clinical Impression       Row Name 10/11/24 1152          Pain    Pretreatment Pain Rating 0/10 - no pain  -AE     Posttreatment Pain Rating 0/10 - no pain  -AE       Row Name 10/11/24 1152          Plan of Care Review    Plan of Care Reviewed With patient  -AE     Progress improving  -AE      Outcome Evaluation Pt presents with decreased functional independence and decreased safety awareness/balance. Pt ambulated 75ft with CGA-min A and RW for support. Continue to progress per pt tolerance.  -AE       Row Name 10/11/24 1152          Vital Signs    Pre Systolic BP Rehab 106  -AE     Pre Treatment Diastolic BP 54  -AE     O2 Delivery Pre Treatment room air  -AE     O2 Delivery Intra Treatment room air  -AE     O2 Delivery Post Treatment room air  -AE     Pre Patient Position Supine  -AE     Intra Patient Position Standing  -AE     Post Patient Position Sitting  -AE       Row Name 10/11/24 1152          Positioning and Restraints    Pre-Treatment Position in bed  -AE     Post Treatment Position chair  -AE     In Chair notified nsg;reclined;call light within reach;encouraged to call for assist;exit alarm on;waffle cushion;legs elevated  -AE               User Key  (r) = Recorded By, (t) = Taken By, (c) = Cosigned By      Initials Name Provider Type    AE Louie Sanchez, PT Physical Therapist                   Outcome Measures       Row Name 10/11/24 1154 10/11/24 0800       How much help from another person do you currently need...    Turning from your back to your side while in flat bed without using bedrails? 3  -AE 3  -JM    Moving from lying on back to sitting on the side of a flat bed without bedrails? 3  -AE 3  -JM    Moving to and from a bed to a chair (including a wheelchair)? 3  -AE 3  -JM    Standing up from a chair using your arms (e.g., wheelchair, bedside chair)? 3  -AE 3  -JM    Climbing 3-5 steps with a railing? 3  -AE 3  -JM    To walk in hospital room? 3  -AE 3  -JM    AM-PAC 6 Clicks Score (PT) 18  -AE 18  -JM    Highest Level of Mobility Goal 6 --> Walk 10 steps or more  -AE 6 --> Walk 10 steps or more  -JM      Row Name 10/11/24 1154          Functional Assessment    Outcome Measure Options AM-PAC 6 Clicks Basic Mobility (PT)  -AE               User Key  (r) = Recorded By, (t) = Taken  By, (c) = Cosigned By      Initials Name Provider Type    AE Louie Sanchez, PT Physical Therapist    Racheal Lindo, RN Registered Nurse                                 Physical Therapy Education       Title: PT OT SLP Therapies (In Progress)       Topic: Physical Therapy (In Progress)       Point: Mobility training (In Progress)       Learning Progress Summary             Patient Acceptance, E, NR by AE at 10/11/2024 1111    Acceptance, E,D, NR by AB at 10/7/2024 1424                         Point: Home exercise program (Not Started)       Learner Progress:  Not documented in this visit.              Point: Body mechanics (In Progress)       Learning Progress Summary             Patient Acceptance, E, NR by AE at 10/11/2024 1111    Acceptance, E,D, NR by AB at 10/7/2024 1424                         Point: Precautions (In Progress)       Learning Progress Summary             Patient Acceptance, E, NR by AE at 10/11/2024 1111    Acceptance, E,D, NR by AB at 10/7/2024 1424                                         User Key       Initials Effective Dates Name Provider Type Discipline    AE 09/21/21 -  Louie Sanchez, PT Physical Therapist PT    AB 09/22/22 -  Nyasia Herrera PT Physical Therapist PT                  PT Recommendation and Plan     Plan of Care Reviewed With: patient  Progress: improving  Outcome Evaluation: Pt presents with decreased functional independence and decreased safety awareness/balance. Pt ambulated 75ft with CGA-min A and RW for support. Continue to progress per pt tolerance.     Time Calculation:         PT Charges       Row Name 10/11/24 1155             Time Calculation    Start Time 1111  -AE      PT Received On 10/11/24  -AE      PT Goal Re-Cert Due Date 10/17/24  -AE         Timed Charges    42400 - PT Therapeutic Activity Minutes 19  -AE         Total Minutes    Timed Charges Total Minutes 19  -AE       Total Minutes 19  -AE                User Key  (r) = Recorded By, (t) =  Taken By, (c) = Cosigned By      Initials Name Provider Type    AE Louie Sanchez, PT Physical Therapist                  Therapy Charges for Today       Code Description Service Date Service Provider Modifiers Qty    [id number] HC PT THERAPEUTIC ACT EA 15 MIN 10/11/2024 Louie Sanchez, PT GP 1            PT G-Codes  Outcome Measure Options: AM-PAC 6 Clicks Basic Mobility (PT)  AM-PAC 6 Clicks Score (PT): 18  PT Discharge Summary  Anticipated Discharge Disposition (PT): inpatient rehabilitation facility    Louie Sanchez PT  10/11/2024      Electronically signed by Louie Sanchez, PT at 10/11/24 1156          Occupational Therapy Notes (most recent note)        Cally Bell, OT at 10/11/24 1105          Patient Name: Marycruz Funez  : 1940    MRN: [id number]                              Today's Date: 10/11/2024       Admit Date: 10/6/2024    Visit Dx:     ICD-10-CM ICD-9-CM   1. Altered mental status, unspecified altered mental status type  R41.82 780.97   2. Dehydration  E86.0 276.51   3. Mild renal insufficiency  N28.9 593.9   4. Recent urinary tract infection  Z87.440 V13.02   5. Dysphagia, unspecified type  R13.10 787.20     Patient Active Problem List   Diagnosis    AMS (altered mental status)    Dysphagia    Gastro-esophageal reflux disease with esophagitis    Sensorineural hearing loss (SNHL) of both ears    Severe malnutrition     History reviewed. No pertinent past medical history.  Past Surgical History:   Procedure Laterality Date    BREAST CYST ASPIRATION Left     HYSTERECTOMY        General Information       Row Name 10/11/24 1314          OT Time and Intention    Document Type evaluation  -CS     Mode of Treatment occupational therapy  -CS       Row Name 10/11/24 1314          General Information    Patient Profile Reviewed yes  -CS     Prior Level of Function independent:;all household mobility;ADL's  -CS     Existing Precautions/Restrictions fall  -CS     Barriers to Rehab  medically complex  -       Row Name 10/11/24 1314          Living Environment    People in Home alone  -       Row Name 10/11/24 1314          Home Main Entrance    Number of Stairs, Main Entrance none  -CS       Row Name 10/11/24 1314          Stairs Within Home, Primary    Number of Stairs, Within Home, Primary none  -CS       Row Name 10/11/24 1314          Cognition    Orientation Status (Cognition) oriented x 4  -CS       Row Name 10/11/24 1314          Safety Issues, Functional Mobility    Impairments Affecting Function (Mobility) balance;endurance/activity tolerance;strength;cognition  -CS     Cognitive Impairments, Mobility Safety/Performance attention;insight into deficits/self-awareness;sequencing abilities;awareness, need for assistance  -               User Key  (r) = Recorded By, (t) = Taken By, (c) = Cosigned By      Initials Name Provider Type    CS Cally Bell, OT Occupational Therapist                     Mobility/ADL's       Row Name 10/11/24 1315          Bed Mobility    Bed Mobility supine-sit  -     Supine-Sit [employer] (Bed Mobility) contact guard;verbal cues  -     Assistive Device (Bed Mobility) head of bed elevated;bed rails  -       Row Name 10/11/24 1315          Transfers    Transfers sit-stand transfer;stand-sit transfer;toilet transfer  -       Row Name 10/11/24 1315          Sit-Stand Transfer    Sit-Stand [employer] (Transfers) contact guard;verbal cues  -CS     Assistive Device (Sit-Stand Transfers) walker, front-wheeled  -       Row Name 10/11/24 1315          Stand-Sit Transfer    Stand-Sit [employer] (Transfers) minimum assist (75% patient effort);verbal cues  -CS     Assistive Device (Stand-Sit Transfers) walker, front-wheeled  -       Row Name 10/11/24 1315          Toilet Transfer    Type (Toilet Transfer) stand-sit;sit-stand  -     [employer] Level (Toilet Transfer) minimum assist (75% patient effort);verbal cues  -     Assistive Device  (Toilet Transfer) walker, front-wheeled;grab bars/safety frame  -       Row Name 10/11/24 1315          Functional Mobility    Functional Mobility- Ind. Level minimum assist (75% patient effort);verbal cues required  -     Functional Mobility- Device walker, front-wheeled  -     Functional Mobility-Distance (Feet) --  to/from the bathroom  -       Row Name 10/11/24 1315          Activities of Daily Living    BADL Assessment/Intervention lower body dressing;toileting;grooming  -       Row Name 10/11/24 1315          Lower Body Dressing Assessment/Training    [place] Level (Lower Body Dressing) don;socks;minimum assist (75% patient effort)  -     Position (Lower Body Dressing) sitting up in bed  -       Row Name 10/11/24 1315          Toileting Assessment/Training    [place] Level (Toileting) adjust/manage clothing;minimum assist (75% patient effort);perform perineal hygiene;standby assist  -     Position (Toileting) supported sitting;supported standing  -       Row Name 10/11/24 1315          Grooming Assessment/Training    [place] Level (Grooming) hair care, combing/brushing;set up  -     Position (Grooming) edge of bed sitting  -               User Key  (r) = Recorded By, (t) = Taken By, (c) = Cosigned By      Initials Name Provider Type    Cally Cody OT Occupational Therapist                   Obj/Interventions       Row Name 10/11/24 1317          Sensory Assessment (Somatosensory)    Sensory Assessment (Somatosensory) UE sensation intact  -       Row Name 10/11/24 1317          Range of Motion Comprehensive    General Range of Motion bilateral upper extremity ROM WFL  -       Row Name 10/11/24 1317          Strength Comprehensive (MMT)    Comment, General Manual Muscle Testing (MMT) Assessment BUE grossly 3+/5  -       Row Name 10/11/24 1317          Balance    Balance Assessment sitting static balance;sitting dynamic balance;standing static balance;standing  dynamic balance  -CS     Static Sitting Balance standby assist  -CS     Dynamic Sitting Balance standby assist  -CS     Position, Sitting Balance sitting edge of bed  -CS     Static Standing Balance contact guard  -CS     Dynamic Standing Balance minimal assist  -CS     Position/Device Used, Standing Balance supported;walker, rolling  -CS     Balance Interventions sitting;standing;static;dynamic;dynamic reaching;occupation based/functional task;weight shifting activity  -CS               User Key  (r) = Recorded By, (t) = Taken By, (c) = Cosigned By      Initials Name Provider Type    CS Cally Bell, OT Occupational Therapist                   Goals/Plan       Row Name 10/11/24 1319          Transfer Goal 1 (OT)    Activity/Assistive Device (Transfer Goal 1, OT) sit-to-stand/stand-to-sit;toilet  -CS     [place] Level/Cues Needed (Transfer Goal 1, OT) standby assist  -CS     Time Frame (Transfer Goal 1, OT) long term goal (LTG);10 days  -CS     Progress/Outcome (Transfer Goal 1, OT) goal ongoing  -CS       Row Name 10/11/24 1319          Dressing Goal 1 (OT)    Activity/Device (Dressing Goal 1, OT) lower body dressing  -CS     [place]/Cues Needed (Dressing Goal 1, OT) standby assist  -CS     Time Frame (Dressing Goal 1, OT) long term goal (LTG);10 days  -CS     Strategies/Barriers (Dressing Goal 1, OT) don/doff socks w/ AAD  -CS     Progress/Outcome (Dressing Goal 1, OT) goal ongoing  -CS       Row Name 10/11/24 1319          Toileting Goal 1 (OT)    Activity/Device (Toileting Goal 1, OT) adjust/manage clothing;perform perineal hygiene  -CS     [place] Level/Cues Needed (Toileting Goal 1, OT) supervision required  -CS     Time Frame (Toileting Goal 1, OT) short term goal (STG);5 days  -CS     Progress/Outcome (Toileting Goal 1, OT) goal ongoing  -CS       Row Name 10/11/24 1319          Therapy Assessment/Plan (OT)    Planned Therapy Interventions (OT) activity tolerance training;adaptive equipment  training;BADL retraining;functional balance retraining;occupation/activity based interventions;ROM/therapeutic exercise;strengthening exercise;transfer/mobility retraining;patient/caregiver education/training  -               User Key  (r) = Recorded By, (t) = Taken By, (c) = Cosigned By      Initials Name Provider Type    CS Cally Bell, MARIO Occupational Therapist                   Clinical Impression       Row Name 10/11/24 1317          Pain Assessment    Pretreatment Pain Rating 0/10 - no pain  -CS     Posttreatment Pain Rating 0/10 - no pain  -CS       Row Name 10/11/24 1317          Plan of Care Review    Plan of Care Reviewed With patient  -CS     Progress improving  -CS     Outcome Evaluation OT eval complete. Pt presents w/ generalized weakness, balance deficits, and decreased safety awareness limiting functional independence from baseline and making pt a high fall risk. Recommend cont skilled IPOT POC to promote return to PLOF. Recommend pt DC to IP rehab.  -       Row Name 10/11/24 1317          Therapy Assessment/Plan (OT)    Patient/Family Therapy Goal Statement (OT) Return to PLOF  -CS     Rehab Potential (OT) good, to achieve stated therapy goals  -CS     Criteria for Skilled Therapeutic Interventions Met (OT) yes;skilled treatment is necessary  -CS     Therapy Frequency (OT) daily  -CS     Predicted Duration of Therapy Intervention (OT) 10 days  -CS       Row Name 10/11/24 1317          Therapy Plan Review/Discharge Plan (OT)    Anticipated Discharge Disposition (OT) inpatient rehabilitation facility  -       Row Name 10/11/24 1317          Vital Signs    O2 Delivery Pre Treatment room air  -CS     O2 Delivery Intra Treatment room air  -CS     O2 Delivery Post Treatment room air  -CS     Pre Patient Position Supine  -CS     Intra Patient Position Standing  -CS     Post Patient Position Sitting  -CS       Row Name 10/11/24 1317          Positioning and Restraints    Pre-Treatment Position in  bed  -CS     Post Treatment Position other  -CS     Other Position with PT  -CS               User Key  (r) = Recorded By, (t) = Taken By, (c) = Cosigned By      Initials Name Provider Type    Cally Cody OT Occupational Therapist                   Outcome Measures       Row Name 10/11/24 1320          How much help from another is currently needed...    Putting on and taking off regular lower body clothing? 2  -CS     Bathing (including washing, rinsing, and drying) 2  -CS     Toileting (which includes using toilet bed pan or urinal) 2  -CS     Putting on and taking off regular upper body clothing 3  -CS     Taking care of personal grooming (such as brushing teeth) 3  -CS     Eating meals 4  -CS     AM-PAC 6 Clicks Score (OT) 16  -CS       Row Name 10/11/24 1154 10/11/24 0800       How much help from another person do you currently need...    Turning from your back to your side while in flat bed without using bedrails? 3  -AE 3  -JM    Moving from lying on back to sitting on the side of a flat bed without bedrails? 3  -AE 3  -JM    Moving to and from a bed to a chair (including a wheelchair)? 3  -AE 3  -JM    Standing up from a chair using your arms (e.g., wheelchair, bedside chair)? 3  -AE 3  -JM    Climbing 3-5 steps with a railing? 3  -AE 3  -JM    To walk in hospital room? 3  -AE 3  -JM    AM-PAC 6 Clicks Score (PT) 18  -AE 18  -JM    Highest Level of Mobility Goal 6 --> Walk 10 steps or more  -AE 6 --> Walk 10 steps or more  -JM      Row Name 10/11/24 1320 10/11/24 1154       Functional Assessment    Outcome Measure Options AM-PAC 6 Clicks Daily Activity (OT)  -CS AM-PAC 6 Clicks Basic Mobility (PT)  -AE              User Key  (r) = Recorded By, (t) = Taken By, (c) = Cosigned By      Initials Name Provider Type    Cally Cody, MARIO Occupational Therapist    Louie Martinez, PT Physical Therapist    Racheal Lindo, RN Registered Nurse                    Occupational Therapy Education        Title: PT OT SLP Therapies (In Progress)       Topic: Occupational Therapy (In Progress)       Point: ADL training (In Progress)       Description:   Instruct learner(s) on proper safety adaptation and remediation techniques during self care or transfers.   Instruct in proper use of assistive devices.                  Learning Progress Summary             Patient Acceptance, E, NR by  at 10/11/2024 1321                         Point: Home exercise program (Not Started)       Description:   Instruct learner(s) on appropriate technique for monitoring, assisting and/or progressing therapeutic exercises/activities.                  Learner Progress:  Not documented in this visit.              Point: Precautions (In Progress)       Description:   Instruct learner(s) on prescribed precautions during self-care and functional transfers.                  Learning Progress Summary             Patient Acceptance, E, NR by  at 10/11/2024 1321                         Point: Body mechanics (In Progress)       Description:   Instruct learner(s) on proper positioning and spine alignment during self-care, functional mobility activities and/or exercises.                  Learning Progress Summary             Patient Acceptance, E, NR by  at 10/11/2024 1321                                         User Key       Initials Effective Dates Name Provider Type Discipline     09/02/21 -  Cally Bell, OT Occupational Therapist OT                  OT Recommendation and Plan  Planned Therapy Interventions (OT): activity tolerance training, adaptive equipment training, BADL retraining, functional balance retraining, occupation/activity based interventions, ROM/therapeutic exercise, strengthening exercise, transfer/mobility retraining, patient/caregiver education/training  Therapy Frequency (OT): daily  Plan of Care Review  Plan of Care Reviewed With: patient  Progress: improving  Outcome Evaluation: OT eval complete. Pt presents w/  generalized weakness, balance deficits, and decreased safety awareness limiting functional independence from baseline and making pt a high fall risk. Recommend cont skilled IPOT POC to promote return to PLOF. Recommend pt DC to IP rehab.     Time Calculation:   Evaluation Complexity (OT)  Review Occupational Profile/Medical/Therapy History Complexity: expanded/moderate complexity  Assessment, Occupational Performance/Identification of Deficit Complexity: 5 or more performance deficits  Clinical Decision Making Complexity (OT): detailed assessment/moderate complexity  Overall Complexity of Evaluation (OT): moderate complexity     Time Calculation- OT       Row Name 10/11/24 1321             Time Calculation- OT    OT Start Time 1105  -CS      OT Received On 10/11/24  -CS      OT Goal Re-Cert Due Date 10/21/24  -CS         Untimed Charges    OT Eval/Re-eval Minutes 46  -CS         Total Minutes    Untimed Charges Total Minutes 46  -CS       Total Minutes 46  -CS                User Key  (r) = Recorded By, (t) = Taken By, (c) = Cosigned By      Initials Name Provider Type    CS Cally Bell OT Occupational Therapist                  Therapy Charges for Today       Code Description Service Date Service Provider Modifiers Qty    [id number] HC OT EVAL MOD COMPLEXITY 4 10/11/2024 aClly Bell OT GO 1                 Cally Bell OT  10/11/2024    Electronically signed by Cally Bell OT at 10/11/24 1322

## 2024-10-12 PROCEDURE — 25010000002 HEPARIN (PORCINE) PER 1000 UNITS: Performed by: STUDENT IN AN ORGANIZED HEALTH CARE EDUCATION/TRAINING PROGRAM

## 2024-10-12 PROCEDURE — 99232 SBSQ HOSP IP/OBS MODERATE 35: CPT | Performed by: NURSE PRACTITIONER

## 2024-10-12 RX ADMIN — Medication 10 ML: at 08:32

## 2024-10-12 RX ADMIN — SENNOSIDES AND DOCUSATE SODIUM 2 TABLET: 50; 8.6 TABLET ORAL at 21:03

## 2024-10-12 RX ADMIN — Medication 5 MG: at 21:03

## 2024-10-12 RX ADMIN — HEPARIN SODIUM 5000 UNITS: 5000 INJECTION INTRAVENOUS; SUBCUTANEOUS at 21:03

## 2024-10-12 RX ADMIN — DONEPEZIL HYDROCHLORIDE 10 MG: 10 TABLET, FILM COATED ORAL at 21:03

## 2024-10-12 RX ADMIN — QUETIAPINE FUMARATE 50 MG: 25 TABLET ORAL at 21:03

## 2024-10-12 RX ADMIN — THIAMINE HCL TAB 100 MG 100 MG: 100 TAB at 08:31

## 2024-10-12 RX ADMIN — PRIMIDONE 25 MG: 50 TABLET ORAL at 08:31

## 2024-10-12 RX ADMIN — Medication 1 TABLET: at 08:31

## 2024-10-12 RX ADMIN — Medication 5000 UNITS: at 08:32

## 2024-10-12 RX ADMIN — POLYETHYLENE GLYCOL 3350 17 G: 17 POWDER, FOR SOLUTION ORAL at 08:31

## 2024-10-12 RX ADMIN — ATORVASTATIN CALCIUM 20 MG: 20 TABLET, FILM COATED ORAL at 08:32

## 2024-10-12 RX ADMIN — HEPARIN SODIUM 5000 UNITS: 5000 INJECTION INTRAVENOUS; SUBCUTANEOUS at 05:01

## 2024-10-12 RX ADMIN — HEPARIN SODIUM 5000 UNITS: 5000 INJECTION INTRAVENOUS; SUBCUTANEOUS at 14:16

## 2024-10-12 RX ADMIN — PAROXETINE HYDROCHLORIDE 40 MG: 20 TABLET, FILM COATED ORAL at 08:31

## 2024-10-12 RX ADMIN — ASPIRIN 81 MG: 81 TABLET, COATED ORAL at 08:31

## 2024-10-12 RX ADMIN — Medication 10 ML: at 21:07

## 2024-10-12 RX ADMIN — PRIMIDONE 25 MG: 50 TABLET ORAL at 21:03

## 2024-10-12 NOTE — PROGRESS NOTES
[facility] Medicine Services  PROGRESS NOTE    Patient Name: Marycruz Funez  : 1940  MRN: [id number]    Date of Admission: 10/6/2024  Primary Care Physician: Frandy Kuhn MD    Subjective     CC: f/u dementia    HPI:  Patient sleeping this morning and did not awaken during exam.  Awaiting placement.    Objective     Vital Signs:   Temp:  [97.6 °F (36.4 °C)-98.2 °F (36.8 °C)] 97.9 °F (36.6 °C)  Heart Rate:  [72-78] 72  Resp:  [18-19] 19  BP: (106-113)/(54-66) 113/66     Physical Exam:  Constitutional: Sleeping and did not awaken during exam, NAD  ENT: Pink, moist mucous membranes   Respiratory: Nonlabored, symmetrical chest expansion, CTAB, RA  Cardiovascular: RRR, no M/R/G  Gastrointestinal: Soft, NT, ND +BS  Musculoskeletal: RIVERA; no LE edema bilaterally  Neurologic: KAREEN, sleeping   Skin: No rashes on exposed skin  Psychiatric: KAREEN    Results Reviewed:  LAB RESULTS:      Lab 10/11/24  1100 10/07/24  0811 10/06/24  2253 10/06/24  1942   WBC 5.62 10.53  --  9.24   HEMOGLOBIN 12.7 12.8  --  14.4   HEMATOCRIT 39.2 40.4  --  45.0   PLATELETS 243 306  --  330   NEUTROS ABS  --   --   --  7.35*   IMMATURE GRANS (ABS)  --   --   --  0.03   LYMPHS ABS  --   --   --  0.93   MONOS ABS  --   --   --  0.66   EOS ABS  --   --   --  0.17   MCV 87.3 87.3  --  86.4   LACTATE  --   --  1.1 3.5*   HSTROP T  --   --   --  19*         Lab 10/11/24  1100 10/10/24  1156 10/07/24  0811 10/06/24  1942   SODIUM 136 136 138 138   POTASSIUM 4.4 4.5 3.4* 4.1   CHLORIDE 103 103 101 102   CO2 24.0 22.0 21.0* 19.0*   ANION GAP 9.0 11.0 16.0* 17.0*   BUN 14 14 29* 26*   CREATININE 0.86 0.86 1.44* 1.71*   EGFR 66.7 66.7 35.9* 29.2*   GLUCOSE 80 102* 75 102*   CALCIUM 8.8 9.1 9.4 9.9   MAGNESIUM  --  2.1 2.1  --    TSH  --   --   --  1.480         Lab 10/06/24  1942   TOTAL PROTEIN 7.3   ALBUMIN 4.6   GLOBULIN 2.7   ALT (SGPT) 24   AST (SGOT) 41*   BILIRUBIN 0.9   ALK PHOS 73         Lab 10/06/24  1942    PROBNP 195.9   HSTROP T 19*             Lab 10/07/24  0811   VITAMIN B 12 441         Brief Urine Lab Results  (Last result in the past 365 days)        Color   Clarity   Blood   Leuk Est   Nitrite   Protein   CREAT   Urine HCG        10/06/24 2044 Yellow   Clear   Negative   Negative   Negative   30 mg/dL (1+)                   Microbiology Results Abnormal       Procedure Component Value - Date/Time    Blood Culture - Blood, Arm, Left [[id number]]  (Normal) Collected: 10/1940    Lab Status: Final result Specimen: Blood from Arm, Left Updated: 10/11/24 2000     Blood Culture No growth at 5 days    Blood Culture - Blood, Arm, Right [[id number]]  (Normal) Collected: 10/06/24 1943    Lab Status: Final result Specimen: Blood from Arm, Right Updated: 10/11/24 2000     Blood Culture No growth at 5 days          No radiology results from the last 24 hrs    Current medications:  Scheduled Meds:aspirin, 81 mg, Oral, Daily  atorvastatin, 20 mg, Oral, Daily  b complex-vitamin c-folic acid, 1 tablet, Oral, Daily  donepezil, 10 mg, Oral, Nightly  heparin (porcine), 5,000 Units, Subcutaneous, Q8H  melatonin, 5 mg, Oral, Nightly  PARoxetine, 40 mg, Oral, Daily  polyethylene glycol, 17 g, Oral, Daily  primidone, 25 mg, Oral, Q12H  QUEtiapine, 50 mg, Oral, Nightly  senna-docusate sodium, 2 tablet, Oral, Nightly  sodium chloride, 10 mL, Intravenous, Q12H  thiamine, 100 mg, Oral, Daily  cholecalciferol, 5,000 Units, Oral, Daily      Continuous Infusions:   PRN Meds:.  acetaminophen **OR** acetaminophen **OR** acetaminophen    [DISCONTINUED] senna-docusate sodium **AND** [DISCONTINUED] polyethylene glycol **AND** bisacodyl **AND** bisacodyl    Calcium Replacement - Follow Nurse / BPA Driven Protocol    Magnesium Standard Dose Replacement - Follow Nurse / BPA Driven Protocol    Phosphorus Replacement - Follow Nurse / BPA Driven Protocol    Potassium Replacement - Follow Nurse / BPA Driven Protocol    [COMPLETED] Insert Peripheral  IV **AND** sodium chloride    sodium chloride    Assessment & Plan     Active Hospital Problems    Diagnosis  POA    **AMS (altered mental status) [R41.82]  Yes    Severe malnutrition [E43]  Yes    Gastro-esophageal reflux disease with esophagitis [K21.00]  Yes    Sensorineural hearing loss (SNHL) of both ears [H90.3]  Yes    Dysphagia [R13.10]  Yes      Resolved Hospital Problems   No resolved problems to display.     Brief Hospital Course to date:  Marycruz Funez is a 84 y.o. female w h/o CKDIIIb, esophageal dysphagia (data deficit), cognitive impairment (mild in 2022) + tremor who presented w concern for hallucinations, wandering, erratic driving. On admission, hallucinations and hyperactivity, UDS + benzo without explanation (patient denies, no recent scripts)    Worsening mental decline, acutely x weeks, on baseline memory impairment  - Medication adjustments per neurology  - My partner and neurology did not feel patient demonstrated capacity to make medical decisions. Son involved and at bedside  - CM following for disposition.  Pending SNF. Would benefit from memory care facility.  -- Sleeping today    Essential tremor - improved on primidone    Hypokalemia-resolved  -- Replaced  -- Monitor labs periodically     CKDIIIB-improved  -- Cr 0.86  - Check periodically.    Esophageal dysphagia   - appears chronic complaint   --consider EGD here if persistent complaint vs OP    Severe malnutrition  -- Nutrition following    Expected Discharge Location and Transportation: SNF  Expected Discharge Expected Discharge Date: 10/14/2024; Expected Discharge Time:      VTE Prophylaxis: Pharmacologic VTE prophylaxis orders are present.    AM-PAC 6 Clicks Score (PT): 20 (10/11/24 2200)    CODE STATUS:   Code Status and Medical Interventions: CPR (Attempt to Resuscitate); Full Support   Ordered at: 10/07/24 0138     Level Of Support Discussed With:    Health Care Surrogate     Code Status (Patient has no pulse and is not  breathing):    CPR (Attempt to Resuscitate)     Medical Interventions (Patient has pulse or is breathing):    Full Support     Alanis Tian, APRN  10/12/24

## 2024-10-12 NOTE — PLAN OF CARE
Problem: Adult Inpatient Plan of Care  Goal: Plan of Care Review  Outcome: Progressing  Goal: Patient-Specific Goal (Individualized)  Outcome: Progressing  Goal: Absence of Hospital-Acquired Illness or Injury  Outcome: Progressing  Intervention: Identify and Manage Fall Risk  Recent Flowsheet Documentation  Taken 10/12/2024 0200 by Maxine Luevano RN  Safety Promotion/Fall Prevention:   activity supervised   assistive device/personal items within reach   clutter free environment maintained   fall prevention program maintained   lighting adjusted   nonskid shoes/slippers when out of bed   safety round/check completed   room organization consistent  Taken 10/12/2024 0000 by Maxine Luevano RN  Safety Promotion/Fall Prevention:   activity supervised   assistive device/personal items within reach   clutter free environment maintained   fall prevention program maintained   lighting adjusted   nonskid shoes/slippers when out of bed   safety round/check completed   room organization consistent  Taken 10/11/2024 2200 by Maxine Luevano RN  Safety Promotion/Fall Prevention:   activity supervised   assistive device/personal items within reach   clutter free environment maintained   fall prevention program maintained   lighting adjusted   nonskid shoes/slippers when out of bed   room organization consistent   safety round/check completed  Taken 10/11/2024 2000 by Maxine Luevano RN  Safety Promotion/Fall Prevention:   activity supervised   clutter free environment maintained   assistive device/personal items within reach   fall prevention program maintained   lighting adjusted   nonskid shoes/slippers when out of bed   safety round/check completed   room organization consistent  Intervention: Prevent Skin Injury  Recent Flowsheet Documentation  Taken 10/12/2024 0200 by Maxine Luevano, RN  Body Position:   position changed independently   side-lying   left  Skin Protection:   skin sealant/moisture barrier applied    transparent dressing maintained   incontinence pads utilized  Taken 10/12/2024 0000 by Maxine Luevano RN  Body Position:   position changed independently   side-lying   right  Skin Protection:   adhesive use limited   transparent dressing maintained   tubing/devices free from skin contact  Taken 10/11/2024 2200 by Maxine Luevano RN  Body Position:   position changed independently   supine, legs elevated  Skin Protection:   adhesive use limited   incontinence pads utilized   tubing/devices free from skin contact   transparent dressing maintained  Taken 10/11/2024 2000 by Maxine Luevano RN  Body Position: (up  in chair) other (see comments)  Skin Protection:   adhesive use limited   transparent dressing maintained   tubing/devices free from skin contact  Intervention: Prevent and Manage VTE (Venous Thromboembolism) Risk  Recent Flowsheet Documentation  Taken 10/11/2024 2200 by Maxine Luevano RN  VTE Prevention/Management: (pt takes heparin)   bilateral   sequential compression devices off  Intervention: Prevent Infection  Recent Flowsheet Documentation  Taken 10/12/2024 0200 by Maxine Luevano RN  Infection Prevention:   cohorting utilized   environmental surveillance performed   equipment surfaces disinfected   hand hygiene promoted   rest/sleep promoted  Taken 10/12/2024 0000 by Maxine Luevano RN  Infection Prevention:   cohorting utilized   environmental surveillance performed   equipment surfaces disinfected   hand hygiene promoted   rest/sleep promoted  Taken 10/11/2024 2200 by Maxine Luevano RN  Infection Prevention:   cohorting utilized   environmental surveillance performed   equipment surfaces disinfected   hand hygiene promoted   rest/sleep promoted  Taken 10/11/2024 2000 by Maxine Luevano RN  Infection Prevention:   cohorting utilized   environmental surveillance performed   equipment surfaces disinfected   hand hygiene promoted   rest/sleep promoted  Goal: Optimal Comfort and  Wellbeing  Outcome: Progressing  Intervention: Provide Person-Centered Care  Recent Flowsheet Documentation  Taken 10/11/2024 2200 by Maxine Luevano RN  Trust Relationship/Rapport:   care explained   choices provided   emotional support provided   empathic listening provided   questions answered   questions encouraged   thoughts/feelings acknowledged   reassurance provided  Taken 10/11/2024 2000 by Maxine Luevano RN  Trust Relationship/Rapport:   care explained   choices provided   emotional support provided   empathic listening provided   questions answered   questions encouraged   reassurance provided   thoughts/feelings acknowledged  Goal: Readiness for Transition of Care  Outcome: Progressing  Goal: Plan of Care Review  Outcome: Progressing  Goal: Patient-Specific Goal (Individualized)  Outcome: Progressing  Goal: Absence of Hospital-Acquired Illness or Injury  Outcome: Progressing  Intervention: Identify and Manage Fall Risk  Recent Flowsheet Documentation  Taken 10/12/2024 0200 by Maxine Luevano RN  Safety Promotion/Fall Prevention:   activity supervised   assistive device/personal items within reach   clutter free environment maintained   fall prevention program maintained   lighting adjusted   nonskid shoes/slippers when out of bed   safety round/check completed   room organization consistent  Taken 10/12/2024 0000 by Maxine Luevano RN  Safety Promotion/Fall Prevention:   activity supervised   assistive device/personal items within reach   clutter free environment maintained   fall prevention program maintained   lighting adjusted   nonskid shoes/slippers when out of bed   safety round/check completed   room organization consistent  Taken 10/11/2024 2200 by Maxine Luevano RN  Safety Promotion/Fall Prevention:   activity supervised   assistive device/personal items within reach   clutter free environment maintained   fall prevention program maintained   lighting adjusted   nonskid  shoes/slippers when out of bed   room organization consistent   safety round/check completed  Taken 10/11/2024 2000 by Maxine Luevano RN  Safety Promotion/Fall Prevention:   activity supervised   clutter free environment maintained   assistive device/personal items within reach   fall prevention program maintained   lighting adjusted   nonskid shoes/slippers when out of bed   safety round/check completed   room organization consistent  Intervention: Prevent Skin Injury  Recent Flowsheet Documentation  Taken 10/12/2024 0200 by Maxine Luevano RN  Body Position:   position changed independently   side-lying   left  Skin Protection:   skin sealant/moisture barrier applied   transparent dressing maintained   incontinence pads utilized  Taken 10/12/2024 0000 by Maxine Luevano RN  Body Position:   position changed independently   side-lying   right  Skin Protection:   adhesive use limited   transparent dressing maintained   tubing/devices free from skin contact  Taken 10/11/2024 2200 by Maxine Luevano RN  Body Position:   position changed independently   supine, legs elevated  Skin Protection:   adhesive use limited   incontinence pads utilized   tubing/devices free from skin contact   transparent dressing maintained  Taken 10/11/2024 2000 by Maxnie Luevano RN  Body Position: (up  in chair) other (see comments)  Skin Protection:   adhesive use limited   transparent dressing maintained   tubing/devices free from skin contact  Intervention: Prevent and Manage VTE (Venous Thromboembolism) Risk  Recent Flowsheet Documentation  Taken 10/11/2024 2200 by Maxine Luevano RN  VTE Prevention/Management: (pt takes heparin)   bilateral   sequential compression devices off  Intervention: Prevent Infection  Recent Flowsheet Documentation  Taken 10/12/2024 0200 by Maxine Luevano RN  Infection Prevention:   cohorting utilized   environmental surveillance performed   equipment surfaces disinfected   hand hygiene  promoted   rest/sleep promoted  Taken 10/12/2024 0000 by Maxine Luevano RN  Infection Prevention:   cohorting utilized   environmental surveillance performed   equipment surfaces disinfected   hand hygiene promoted   rest/sleep promoted  Taken 10/11/2024 2200 by Maxine Luevano RN  Infection Prevention:   cohorting utilized   environmental surveillance performed   equipment surfaces disinfected   hand hygiene promoted   rest/sleep promoted  Taken 10/11/2024 2000 by Maxine Luevano RN  Infection Prevention:   cohorting utilized   environmental surveillance performed   equipment surfaces disinfected   hand hygiene promoted   rest/sleep promoted  Goal: Optimal Comfort and Wellbeing  Outcome: Progressing  Intervention: Provide Person-Centered Care  Recent Flowsheet Documentation  Taken 10/11/2024 2200 by Maxine Luevano RN  Trust Relationship/Rapport:   care explained   choices provided   emotional support provided   empathic listening provided   questions answered   questions encouraged   thoughts/feelings acknowledged   reassurance provided  Taken 10/11/2024 2000 by Maxine Luevano RN  Trust Relationship/Rapport:   care explained   choices provided   emotional support provided   empathic listening provided   questions answered   questions encouraged   reassurance provided   thoughts/feelings acknowledged  Goal: Readiness for Transition of Care  Outcome: Progressing     Problem: Fall Injury Risk  Goal: Absence of Fall and Fall-Related Injury  Outcome: Progressing  Intervention: Identify and Manage Contributors  Recent Flowsheet Documentation  Taken 10/12/2024 0200 by Maxine Luevano RN  Self-Care Promotion: independence encouraged  Taken 10/12/2024 0000 by Maixne Luevano RN  Self-Care Promotion: independence encouraged  Taken 10/11/2024 2200 by Maxine Luevano RN  Medication Review/Management: medications reviewed  Self-Care Promotion: independence encouraged  Taken 10/11/2024 2000 by Bassem  Maxine, RN  Self-Care Promotion: independence encouraged  Intervention: Promote Injury-Free Environment  Recent Flowsheet Documentation  Taken 10/12/2024 0200 by Maxine Luevano RN  Safety Promotion/Fall Prevention:   activity supervised   assistive device/personal items within reach   clutter free environment maintained   fall prevention program maintained   lighting adjusted   nonskid shoes/slippers when out of bed   safety round/check completed   room organization consistent  Taken 10/12/2024 0000 by Maxine Luevano RN  Safety Promotion/Fall Prevention:   activity supervised   assistive device/personal items within reach   clutter free environment maintained   fall prevention program maintained   lighting adjusted   nonskid shoes/slippers when out of bed   safety round/check completed   room organization consistent  Taken 10/11/2024 2200 by Maxine Luevano RN  Safety Promotion/Fall Prevention:   activity supervised   assistive device/personal items within reach   clutter free environment maintained   fall prevention program maintained   lighting adjusted   nonskid shoes/slippers when out of bed   room organization consistent   safety round/check completed  Taken 10/11/2024 2000 by Maxine Luevano RN  Safety Promotion/Fall Prevention:   activity supervised   clutter free environment maintained   assistive device/personal items within reach   fall prevention program maintained   lighting adjusted   nonskid shoes/slippers when out of bed   safety round/check completed   room organization consistent     Problem: UTI (Urinary Tract Infection)  Goal: Improved Infection Symptoms  Outcome: Progressing  Intervention: Prevent Infection Progression  Recent Flowsheet Documentation  Taken 10/12/2024 0200 by Maxine Luevano RN  Infection Management: aseptic technique maintained  Taken 10/12/2024 0000 by Maxine Luevano RN  Infection Management: aseptic technique maintained  Taken 10/11/2024 2200 by Bassem  [facility]  Infection Management: aseptic technique maintained  Taken 10/11/2024 2000 by Maxine Luevano RN  Infection Management: aseptic technique maintained     Problem: Confusion Acute  Goal: Optimal Cognitive Function  Outcome: Progressing  Intervention: Minimize Contributing Factors  Recent Flowsheet Documentation  Taken 10/12/2024 0200 by Maxine Luevano RN  Sensory Stimulation Regulation:   lighting decreased   care clustered  Reorientation Measures:   calendar in view   clock in view  Environment Familiarity/Consistency: daily routine followed  Taken 10/12/2024 0000 by Maxine Luevano RN  Sensory Stimulation Regulation:   lighting decreased   care clustered  Reorientation Measures:   calendar in view   clock in view  Environment Familiarity/Consistency: daily routine followed  Taken 10/11/2024 2200 by Maxine Luevano RN  Sensory Stimulation Regulation:   lighting decreased   care clustered  Reorientation Measures:   calendar in view   clock in view  Environment Familiarity/Consistency: daily routine followed  Taken 10/11/2024 2000 by Maxine Luevano RN  Sensory Stimulation Regulation:   lighting decreased   care clustered  Reorientation Measures:   calendar in view   clock in view  Environment Familiarity/Consistency: daily routine followed     Problem: Skin Injury Risk Increased  Goal: Skin Health and Integrity  Outcome: Progressing  Intervention: Optimize Skin Protection  Recent Flowsheet Documentation  Taken 10/12/2024 0200 by Maxine Luevano RN  Activity Management: activity encouraged  Pressure Reduction Techniques:   frequent weight shift encouraged   pressure points protected   weight shift assistance provided  Head of Bed (HOB) Positioning: HOB elevated  Pressure Reduction Devices:   pressure-redistributing mattress utilized   positioning supports utilized  Skin Protection:   skin sealant/moisture barrier applied   transparent dressing maintained   incontinence pads utilized  Taken  10/12/2024 0000 by Maxine Luevano RN  Activity Management: activity encouraged  Pressure Reduction Techniques:   frequent weight shift encouraged   pressure points protected   weight shift assistance provided  Head of Bed (HOB) Positioning: HOB elevated  Pressure Reduction Devices:   pressure-redistributing mattress utilized   positioning supports utilized  Skin Protection:   adhesive use limited   transparent dressing maintained   tubing/devices free from skin contact  Taken 10/11/2024 2200 by Maxine Luevano RN  Activity Management: back to bed  Pressure Reduction Techniques:   frequent weight shift encouraged   sit time limited to 2 hours   pressure points protected  Head of Bed (HOB) Positioning: HOB elevated  Pressure Reduction Devices:   pressure-redistributing mattress utilized   positioning supports utilized  Skin Protection:   adhesive use limited   incontinence pads utilized   tubing/devices free from skin contact   transparent dressing maintained  Taken 10/11/2024 2000 by Maxine Luevano RN  Activity Management: up in chair  Pressure Reduction Techniques:   frequent weight shift encouraged   weight shift assistance provided   pressure points protected  Head of Bed (HOB) Positioning: [facility] elevated  Pressure Reduction Devices:   pressure-redistributing mattress utilized   positioning supports utilized  Skin Protection:   adhesive use limited   transparent dressing maintained   tubing/devices free from skin contact   Goal Outcome Evaluation:      10/11/24- Pt remains disoriented to situation. VSS. Pt has done very well today at walking to restroom with walker. Pt has slept very well, and not tried to get out of bed without assistance. RA. Non tele. Plan is for pt to go to signature in [facility] upon INS approval. Plan of care ongoing.

## 2024-10-13 PROCEDURE — 99232 SBSQ HOSP IP/OBS MODERATE 35: CPT | Performed by: NURSE PRACTITIONER

## 2024-10-13 PROCEDURE — 25010000002 HEPARIN (PORCINE) PER 1000 UNITS: Performed by: STUDENT IN AN ORGANIZED HEALTH CARE EDUCATION/TRAINING PROGRAM

## 2024-10-13 RX ADMIN — HEPARIN SODIUM 5000 UNITS: 5000 INJECTION INTRAVENOUS; SUBCUTANEOUS at 13:39

## 2024-10-13 RX ADMIN — Medication 1 TABLET: at 08:37

## 2024-10-13 RX ADMIN — PRIMIDONE 25 MG: 50 TABLET ORAL at 08:35

## 2024-10-13 RX ADMIN — ASPIRIN 81 MG: 81 TABLET, COATED ORAL at 08:37

## 2024-10-13 RX ADMIN — PRIMIDONE 25 MG: 50 TABLET ORAL at 20:26

## 2024-10-13 RX ADMIN — Medication 5000 UNITS: at 08:37

## 2024-10-13 RX ADMIN — QUETIAPINE FUMARATE 50 MG: 25 TABLET ORAL at 20:26

## 2024-10-13 RX ADMIN — PAROXETINE HYDROCHLORIDE 40 MG: 20 TABLET, FILM COATED ORAL at 08:42

## 2024-10-13 RX ADMIN — HEPARIN SODIUM 5000 UNITS: 5000 INJECTION INTRAVENOUS; SUBCUTANEOUS at 21:16

## 2024-10-13 RX ADMIN — POLYETHYLENE GLYCOL 3350 17 G: 17 POWDER, FOR SOLUTION ORAL at 08:42

## 2024-10-13 RX ADMIN — SENNOSIDES AND DOCUSATE SODIUM 2 TABLET: 50; 8.6 TABLET ORAL at 20:26

## 2024-10-13 RX ADMIN — Medication 10 ML: at 20:32

## 2024-10-13 RX ADMIN — ATORVASTATIN CALCIUM 20 MG: 20 TABLET, FILM COATED ORAL at 08:36

## 2024-10-13 RX ADMIN — THIAMINE HCL TAB 100 MG 100 MG: 100 TAB at 08:37

## 2024-10-13 RX ADMIN — DONEPEZIL HYDROCHLORIDE 10 MG: 10 TABLET, FILM COATED ORAL at 20:26

## 2024-10-13 RX ADMIN — HEPARIN SODIUM 5000 UNITS: 5000 INJECTION INTRAVENOUS; SUBCUTANEOUS at 05:30

## 2024-10-13 RX ADMIN — Medication 5 MG: at 20:26

## 2024-10-13 NOTE — PLAN OF CARE
Problem: Adult Inpatient Plan of Care  Goal: Plan of Care Review  Outcome: Progressing  Goal: Patient-Specific Goal (Individualized)  Outcome: Progressing  Goal: Absence of Hospital-Acquired Illness or Injury  Outcome: Progressing  Intervention: Identify and Manage Fall Risk  Recent Flowsheet Documentation  Taken 10/13/2024 0000 by Jenifer Thompson RN  Safety Promotion/Fall Prevention:   activity supervised   assistive device/personal items within reach   clutter free environment maintained   fall prevention program maintained   gait belt   lighting adjusted   nonskid shoes/slippers when out of bed   room organization consistent   safety round/check completed  Taken 10/12/2024 2200 by Jenifer Thompson RN  Safety Promotion/Fall Prevention:   activity supervised   assistive device/personal items within reach   clutter free environment maintained   fall prevention program maintained   gait belt   lighting adjusted   nonskid shoes/slippers when out of bed   room organization consistent   safety round/check completed  Taken 10/12/2024 2000 by Jenifer Thompson RN  Safety Promotion/Fall Prevention:   activity supervised   assistive device/personal items within reach   clutter free environment maintained   fall prevention program maintained   gait belt   lighting adjusted   nonskid shoes/slippers when out of bed   room organization consistent   safety round/check completed  Intervention: Prevent Skin Injury  Recent Flowsheet Documentation  Taken 10/13/2024 0000 by Jenifer Thompson RN  Body Position: position changed independently  Skin Protection:   incontinence pads utilized   transparent dressing maintained  Taken 10/12/2024 2200 by Jenifer Thompson RN  Body Position: position changed independently  Skin Protection:   incontinence pads utilized   transparent dressing maintained  Taken 10/12/2024 2000 by Jenifer Thompson RN  Body Position: position changed independently  Skin Protection:   incontinence pads  utilized   transparent dressing maintained  Intervention: Prevent and Manage VTE (Venous Thromboembolism) Risk  Recent Flowsheet Documentation  Taken 10/12/2024 2000 by Jenifer Thompson RN  VTE Prevention/Management:   SCDs (sequential compression devices) off   bilateral   patient refused intervention  Intervention: Prevent Infection  Recent Flowsheet Documentation  Taken 10/13/2024 0000 by Jenifer Thompson RN  Infection Prevention:   environmental surveillance performed   rest/sleep promoted  Taken 10/12/2024 2200 by Jenifer Thompson RN  Infection Prevention:   environmental surveillance performed   rest/sleep promoted  Taken 10/12/2024 2000 by Jenifer Thompson RN  Infection Prevention:   environmental surveillance performed   rest/sleep promoted  Goal: Optimal Comfort and Wellbeing  Outcome: Progressing  Intervention: Provide Person-Centered Care  Recent Flowsheet Documentation  Taken 10/13/2024 0000 by Jenifer Thompson RN  Trust Relationship/Rapport:   care explained   choices provided   emotional support provided   empathic listening provided   questions answered   questions encouraged   reassurance provided   thoughts/feelings acknowledged  Taken 10/12/2024 2200 by Jenifer Thompson RN  Trust Relationship/Rapport:   care explained   choices provided   emotional support provided   empathic listening provided   questions answered   questions encouraged   reassurance provided   thoughts/feelings acknowledged  Taken 10/12/2024 2000 by Jenifer Thompson RN  Trust Relationship/Rapport:   care explained   choices provided   emotional support provided   empathic listening provided   questions answered   questions encouraged   reassurance provided   thoughts/feelings acknowledged  Goal: Readiness for Transition of Care  Outcome: Progressing  Goal: Plan of Care Review  Outcome: Progressing  Goal: Patient-Specific Goal (Individualized)  Outcome: Progressing  Goal: Absence of Hospital-Acquired Illness or  Injury  Outcome: Progressing  Intervention: Identify and Manage Fall Risk  Recent Flowsheet Documentation  Taken 10/13/2024 0000 by Jenifer Thompson RN  Safety Promotion/Fall Prevention:   activity supervised   assistive device/personal items within reach   clutter free environment maintained   fall prevention program maintained   gait belt   lighting adjusted   nonskid shoes/slippers when out of bed   room organization consistent   safety round/check completed  Taken 10/12/2024 2200 by Jenifer Thompson RN  Safety Promotion/Fall Prevention:   activity supervised   assistive device/personal items within reach   clutter free environment maintained   fall prevention program maintained   gait belt   lighting adjusted   nonskid shoes/slippers when out of bed   room organization consistent   safety round/check completed  Taken 10/12/2024 2000 by Jenifer Thompson RN  Safety Promotion/Fall Prevention:   activity supervised   assistive device/personal items within reach   clutter free environment maintained   fall prevention program maintained   gait belt   lighting adjusted   nonskid shoes/slippers when out of bed   room organization consistent   safety round/check completed  Intervention: Prevent Skin Injury  Recent Flowsheet Documentation  Taken 10/13/2024 0000 by Jenifer Thompson RN  Body Position: position changed independently  Skin Protection:   incontinence pads utilized   transparent dressing maintained  Taken 10/12/2024 2200 by Jenifer Thompson RN  Body Position: position changed independently  Skin Protection:   incontinence pads utilized   transparent dressing maintained  Taken 10/12/2024 2000 by Jenifer Thompson RN  Body Position: position changed independently  Skin Protection:   incontinence pads utilized   transparent dressing maintained  Intervention: Prevent and Manage VTE (Venous Thromboembolism) Risk  Recent Flowsheet Documentation  Taken 10/12/2024 2000 by Jenifer Thompson RN  VTE  Prevention/Management:   SCDs (sequential compression devices) off   bilateral   patient refused intervention  Intervention: Prevent Infection  Recent Flowsheet Documentation  Taken 10/13/2024 0000 by Jenifer Thompson RN  Infection Prevention:   environmental surveillance performed   rest/sleep promoted  Taken 10/12/2024 2200 by Jenifer Thompson RN  Infection Prevention:   environmental surveillance performed   rest/sleep promoted  Taken 10/12/2024 2000 by Jenifer Thompson RN  Infection Prevention:   environmental surveillance performed   rest/sleep promoted  Goal: Optimal Comfort and Wellbeing  Outcome: Progressing  Intervention: Provide Person-Centered Care  Recent Flowsheet Documentation  Taken 10/13/2024 0000 by Jenifer Thompson RN  Trust Relationship/Rapport:   care explained   choices provided   emotional support provided   empathic listening provided   questions answered   questions encouraged   reassurance provided   thoughts/feelings acknowledged  Taken 10/12/2024 2200 by Jenifer Thompson RN  Trust Relationship/Rapport:   care explained   choices provided   emotional support provided   empathic listening provided   questions answered   questions encouraged   reassurance provided   thoughts/feelings acknowledged  Taken 10/12/2024 2000 by Jenifer Thompson RN  Trust Relationship/Rapport:   care explained   choices provided   emotional support provided   empathic listening provided   questions answered   questions encouraged   reassurance provided   thoughts/feelings acknowledged  Goal: Readiness for Transition of Care  Outcome: Progressing     Problem: Fall Injury Risk  Goal: Absence of Fall and Fall-Related Injury  Outcome: Progressing  Intervention: Identify and Manage Contributors  Recent Flowsheet Documentation  Taken 10/13/2024 0000 by Jenifer Thompson RN  Medication Review/Management: medications reviewed  Self-Care Promotion: independence encouraged  Taken 10/12/2024 2200 by Jenifer Thompson  RN  Medication Review/Management: medications reviewed  Self-Care Promotion: independence encouraged  Taken 10/12/2024 2000 by Jenifer Thompson RN  Medication Review/Management: medications reviewed  Self-Care Promotion: independence encouraged  Intervention: Promote Injury-Free Environment  Recent Flowsheet Documentation  Taken 10/13/2024 0000 by Jenifer Thompson RN  Safety Promotion/Fall Prevention:   activity supervised   assistive device/personal items within reach   clutter free environment maintained   fall prevention program maintained   gait belt   lighting adjusted   nonskid shoes/slippers when out of bed   room organization consistent   safety round/check completed  Taken 10/12/2024 2200 by Jenifer Thompson RN  Safety Promotion/Fall Prevention:   activity supervised   assistive device/personal items within reach   clutter free environment maintained   fall prevention program maintained   gait belt   lighting adjusted   nonskid shoes/slippers when out of bed   room organization consistent   safety round/check completed  Taken 10/12/2024 2000 by Jenifer Thompson RN  Safety Promotion/Fall Prevention:   activity supervised   assistive device/personal items within reach   clutter free environment maintained   fall prevention program maintained   gait belt   lighting adjusted   nonskid shoes/slippers when out of bed   room organization consistent   safety round/check completed     Problem: UTI (Urinary Tract Infection)  Goal: Improved Infection Symptoms  Outcome: Progressing  Intervention: Prevent Infection Progression  Recent Flowsheet Documentation  Taken 10/13/2024 0000 by Jenifer Thompson RN  Infection Management: aseptic technique maintained  Taken 10/12/2024 2200 by Jenifer Thompson RN  Infection Management: aseptic technique maintained  Taken 10/12/2024 2000 by Jenifer Thompson RN  Infection Management: aseptic technique maintained     Problem: Confusion Acute  Goal: Optimal Cognitive  Function  Outcome: Progressing  Intervention: Minimize Contributing Factors  Recent Flowsheet Documentation  Taken 10/13/2024 0000 by Jenifer Thompson RN  Sensory Stimulation Regulation:   care clustered   lighting decreased   quiet environment promoted  Reorientation Measures: reorientation provided  Environment Familiarity/Consistency: daily routine followed  Taken 10/12/2024 2200 by Jenifer Thompson RN  Sensory Stimulation Regulation:   care clustered   lighting decreased   quiet environment promoted  Reorientation Measures: reorientation provided  Environment Familiarity/Consistency: daily routine followed  Taken 10/12/2024 2000 by Jenifer Thompson RN  Sensory Stimulation Regulation:   care clustered   lighting decreased   quiet environment promoted  Reorientation Measures: reorientation provided  Environment Familiarity/Consistency: daily routine followed     Problem: Skin Injury Risk Increased  Goal: Skin Health and Integrity  Outcome: Progressing  Intervention: Optimize Skin Protection  Recent Flowsheet Documentation  Taken 10/13/2024 0000 by Jenifer Thompson RN  Activity Management: activity encouraged  Pressure Reduction Techniques:   frequent weight shift encouraged   pressure points protected   weight shift assistance provided  Head of Bed (HOB) Positioning: HOB elevated  Pressure Reduction Devices:   chair cushion utilized   pressure-redistributing mattress utilized  Skin Protection:   incontinence pads utilized   transparent dressing maintained  Taken 10/12/2024 2200 by Jenifer Thompson RN  Activity Management: activity encouraged  Pressure Reduction Techniques:   frequent weight shift encouraged   pressure points protected   weight shift assistance provided  Head of Bed (HOB) Positioning: HOB elevated  Pressure Reduction Devices:   chair cushion utilized   pressure-redistributing mattress utilized  Skin Protection:   incontinence pads utilized   transparent dressing maintained  Taken 10/12/2024  2000 by Jenifer Thompson, RN  Activity Management: activity encouraged  Pressure Reduction Techniques:   frequent weight shift encouraged   pressure points protected   weight shift assistance provided  Head of Bed (HOB) Positioning: HOB elevated  Pressure Reduction Devices:   chair cushion utilized   pressure-redistributing mattress utilized  Skin Protection:   incontinence pads utilized   transparent dressing maintained   Goal Outcome Evaluation:

## 2024-10-13 NOTE — PROGRESS NOTES
[facility] Medicine Services  PROGRESS NOTE    Patient Name: Marycruz Funez  : 1940  MRN: [id number]    Date of Admission: 10/6/2024  Primary Care Physician: Frandy Kuhn MD    Subjective     CC: f/u dementia    HPI:  Patient awake sitting up in bed talking to nurse.  She is fully oriented awaiting placement.    Objective     Vital Signs:   Temp:  [97.5 °F (36.4 °C)-98.3 °F (36.8 °C)] 97.7 °F (36.5 °C)  Heart Rate:  [62-70] 68  Resp:  [16-18] 16  BP: ()/(55-76) 106/59     Physical Exam:  Constitutional: Alert, frail elderly chronically ill-appearing female sitting up in bed.  ENT: Pink, moist mucous membranes   Respiratory: Nonlabored, symmetrical chest expansion, CTAB, RA  Cardiovascular: RRR, no M/R/G  Gastrointestinal: Soft, NT, ND +BS  Musculoskeletal: RIVERA; no LE edema bilaterally  Neurologic: Oriented x 3, strength symmetric in all extremities, follows all commands,speech clear  Skin: No rashes on exposed skin  Psychiatric: Pleasant and cooperative; normal affect    Results Reviewed:  LAB RESULTS:      Lab 10/11/24  1100 10/07/24  0811 10/06/24  2253 10/06/24  1942   WBC 5.62 10.53  --  9.24   HEMOGLOBIN 12.7 12.8  --  14.4   HEMATOCRIT 39.2 40.4  --  45.0   PLATELETS 243 306  --  330   NEUTROS ABS  --   --   --  7.35*   IMMATURE GRANS (ABS)  --   --   --  0.03   LYMPHS ABS  --   --   --  0.93   MONOS ABS  --   --   --  0.66   EOS ABS  --   --   --  0.17   MCV 87.3 87.3  --  86.4   LACTATE  --   --  1.1 3.5*   HSTROP T  --   --   --  19*         Lab 10/11/24  1100 10/10/24  1156 10/07/24  0811 10/06/24  1942   SODIUM 136 136 138 138   POTASSIUM 4.4 4.5 3.4* 4.1   CHLORIDE 103 103 101 102   CO2 24.0 22.0 21.0* 19.0*   ANION GAP 9.0 11.0 16.0* 17.0*   BUN 14 14 29* 26*   CREATININE 0.86 0.86 1.44* 1.71*   EGFR 66.7 66.7 35.9* 29.2*   GLUCOSE 80 102* 75 102*   CALCIUM 8.8 9.1 9.4 9.9   MAGNESIUM  --  2.1 2.1  --    TSH  --   --   --  1.480         Lab 10/06/24  1942    TOTAL PROTEIN 7.3   ALBUMIN 4.6   GLOBULIN 2.7   ALT (SGPT) 24   AST (SGOT) 41*   BILIRUBIN 0.9   ALK PHOS 73         Lab 10/06/24  1942   PROBNP 195.9   HSTROP T 19*             Lab 10/07/24  0811   VITAMIN B 12 441         Brief Urine Lab Results  (Last result in the past 365 days)        Color   Clarity   Blood   Leuk Est   Nitrite   Protein   CREAT   Urine HCG        10/06/24 2044 Yellow   Clear   Negative   Negative   Negative   30 mg/dL (1+)                   Microbiology Results Abnormal       Procedure Component Value - Date/Time    Blood Culture - Blood, Arm, Left [[id number]]  (Normal) Collected: 10/1940    Lab Status: Final result Specimen: Blood from Arm, Left Updated: 10/11/24 2000     Blood Culture No growth at 5 days    Blood Culture - Blood, Arm, Right [[id number]]  (Normal) Collected: 10/06/24 1943    Lab Status: Final result Specimen: Blood from Arm, Right Updated: 10/11/24 2000     Blood Culture No growth at 5 days          No radiology results from the last 24 hrs    Current medications:  Scheduled Meds:aspirin, 81 mg, Oral, Daily  atorvastatin, 20 mg, Oral, Daily  b complex-vitamin c-folic acid, 1 tablet, Oral, Daily  donepezil, 10 mg, Oral, Nightly  heparin (porcine), 5,000 Units, Subcutaneous, Q8H  melatonin, 5 mg, Oral, Nightly  PARoxetine, 40 mg, Oral, Daily  polyethylene glycol, 17 g, Oral, Daily  primidone, 25 mg, Oral, Q12H  QUEtiapine, 50 mg, Oral, Nightly  senna-docusate sodium, 2 tablet, Oral, Nightly  sodium chloride, 10 mL, Intravenous, Q12H  thiamine, 100 mg, Oral, Daily  cholecalciferol, 5,000 Units, Oral, Daily      Continuous Infusions:   PRN Meds:.  acetaminophen **OR** acetaminophen **OR** acetaminophen    [DISCONTINUED] senna-docusate sodium **AND** [DISCONTINUED] polyethylene glycol **AND** bisacodyl **AND** bisacodyl    Calcium Replacement - Follow Nurse / BPA Driven Protocol    Magnesium Standard Dose Replacement - Follow Nurse / BPA Driven Protocol    Phosphorus  Replacement - Follow Nurse / BPA Driven Protocol    Potassium Replacement - Follow Nurse / BPA Driven Protocol    [COMPLETED] Insert Peripheral IV **AND** sodium chloride    sodium chloride    Assessment & Plan     Active Hospital Problems    Diagnosis  POA    **AMS (altered mental status) [R41.82]  Yes    Severe malnutrition [E43]  Yes    Gastro-esophageal reflux disease with esophagitis [K21.00]  Yes    Sensorineural hearing loss (SNHL) of both ears [H90.3]  Yes    Dysphagia [R13.10]  Yes      Resolved Hospital Problems   No resolved problems to display.     Brief Hospital Course to date:  Marycruz Funez is a 84 y.o. female w h/o CKDIIIb, esophageal dysphagia (data deficit), cognitive impairment (mild in 2022) + tremor who presented w concern for hallucinations, wandering, erratic driving. On admission, hallucinations and hyperactivity, UDS + benzo without explanation (patient denies, no recent scripts)    Worsening mental decline, acutely x weeks, on baseline memory impairment  - Medication adjustments per neurology  - My partner and neurology did not feel patient demonstrated capacity to make medical decisions. Son involved and at bedside  - CM following for disposition.  Pending SNF. Would benefit from memory care facility.  - Oriented to self, [facility] and year today    Essential tremor - improved on primidone    Hypokalemia-resolved  -- Replaced  -- Monitor labs periodically     CKDIIIB-improved  -- Cr 0.86  - Check periodically.    Esophageal dysphagia   - appears chronic complaint   --consider EGD here if persistent complaint vs OP    Severe malnutrition  -- Nutrition following    Expected Discharge Location and Transportation: SNF  Expected Discharge Expected Discharge Date: 10/14/2024; Expected Discharge Time:      VTE Prophylaxis: Pharmacologic VTE prophylaxis orders are present.    AM-PAC 6 Clicks Score (PT): 20 (10/13/24 0800)    CODE STATUS:   Code Status and Medical Interventions: CPR  (Attempt to Resuscitate); Full Support   Ordered at: 10/07/24 0138     Level Of Support Discussed With:    Health Care Surrogate     Code Status (Patient has no pulse and is not breathing):    CPR (Attempt to Resuscitate)     Medical Interventions (Patient has pulse or is breathing):    Full Support     Alanis Tian, PETER  10/13/24

## 2024-10-14 VITALS
OXYGEN SATURATION: 98 % | HEIGHT: 66 IN | WEIGHT: 102 LBS | SYSTOLIC BLOOD PRESSURE: 118 MMHG | BODY MASS INDEX: 16.39 KG/M2 | TEMPERATURE: 98 F | RESPIRATION RATE: 16 BRPM | DIASTOLIC BLOOD PRESSURE: 70 MMHG | HEART RATE: 59 BPM

## 2024-10-14 PROCEDURE — 99239 HOSP IP/OBS DSCHRG MGMT >30: CPT | Performed by: NURSE PRACTITIONER

## 2024-10-14 PROCEDURE — 25010000002 HEPARIN (PORCINE) PER 1000 UNITS: Performed by: STUDENT IN AN ORGANIZED HEALTH CARE EDUCATION/TRAINING PROGRAM

## 2024-10-14 RX ORDER — DONEPEZIL HYDROCHLORIDE 10 MG/1
10 TABLET, FILM COATED ORAL NIGHTLY
Start: 2024-10-14

## 2024-10-14 RX ORDER — POLYETHYLENE GLYCOL 3350 17 G/17G
17 POWDER, FOR SOLUTION ORAL DAILY
Start: 2024-10-15

## 2024-10-14 RX ORDER — QUETIAPINE FUMARATE 50 MG/1
50 TABLET, FILM COATED ORAL NIGHTLY
Start: 2024-10-14

## 2024-10-14 RX ORDER — AMOXICILLIN 250 MG
2 CAPSULE ORAL NIGHTLY
Start: 2024-10-14

## 2024-10-14 RX ORDER — LANOLIN ALCOHOL/MO/W.PET/CERES
100 CREAM (GRAM) TOPICAL DAILY
Start: 2024-10-15

## 2024-10-14 RX ORDER — PRIMIDONE 50 MG/1
25 TABLET ORAL EVERY 12 HOURS SCHEDULED
Start: 2024-10-14

## 2024-10-14 RX ORDER — FOLIC ACID/VIT B COMPLEX AND C 0.8 MG
1 TABLET ORAL DAILY
Start: 2024-10-15

## 2024-10-14 RX ADMIN — Medication 10 ML: at 09:46

## 2024-10-14 RX ADMIN — PAROXETINE HYDROCHLORIDE 40 MG: 20 TABLET, FILM COATED ORAL at 09:41

## 2024-10-14 RX ADMIN — THIAMINE HCL TAB 100 MG 100 MG: 100 TAB at 09:41

## 2024-10-14 RX ADMIN — HEPARIN SODIUM 5000 UNITS: 5000 INJECTION INTRAVENOUS; SUBCUTANEOUS at 06:05

## 2024-10-14 RX ADMIN — PRIMIDONE 25 MG: 50 TABLET ORAL at 09:40

## 2024-10-14 RX ADMIN — ATORVASTATIN CALCIUM 20 MG: 20 TABLET, FILM COATED ORAL at 09:41

## 2024-10-14 RX ADMIN — Medication 5000 UNITS: at 09:41

## 2024-10-14 RX ADMIN — ASPIRIN 81 MG: 81 TABLET, COATED ORAL at 09:41

## 2024-10-14 RX ADMIN — Medication 1 TABLET: at 09:41

## 2024-10-14 NOTE — CASE MANAGEMENT/SOCIAL WORK
Case Management Discharge Note      Final Note: Patient's plan is as kiled bed at [facility] in [place] today, 10/14.  Nurse to call report to [phone].  Facility will get discharge summary out of [facility].  Son will transport.         Selected Continued Care - Admitted Since 10/6/2024       Destination Coordination complete.      Service Provider Services Address Phone Fax Patient Preferred    [facility] [address] [address] [phone] --              Durable Medical Equipment    No services have been selected for the patient.                Dialysis/Infusion    No services have been selected for the patient.                Home Medical Care    No services have been selected for the patient.                Therapy    No services have been selected for the patient.                Community Resources    No services have been selected for the patient.                Community & DME    No services have been selected for the patient.                         Final Discharge Disposition Code: 03 - skilled nursing facility (SNF)

## 2024-10-14 NOTE — DISCHARGE SUMMARY
[facility] Medicine Services  DISCHARGE SUMMARY    Patient Name: Marycruz Funez  : 1940  MRN: [id number]    Date of Admission: 10/6/2024  6:43 PM  Date of Discharge:  10/14/2024  Primary Care Physician: Frandy Kuhn MD    Consults       Date and Time Order Name Status Description    10/7/2024  2:08 AM Inpatient Neurology Consult General Completed             Hospital Course     Active Hospital Problems    Diagnosis  POA    **AMS (altered mental status) [R41.82]  Yes    Severe malnutrition [E43]  Yes    Gastro-esophageal reflux disease with esophagitis [K21.00]  Yes    Sensorineural hearing loss (SNHL) of both ears [H90.3]  Yes    Dysphagia [R13.10]  Yes      Resolved Hospital Problems   No resolved problems to display.      Hospital Course:  Marycruz Funez is a 84 y.o. female  w h/o CKDIIIb, esophageal dysphagia (data deficit), cognitive impairment (mild in ) + tremor who presented w concern for hallucinations, wandering, erratic driving. On admission, hallucinations and hyperactivity, UDS + benzo without explanation (patient denies, no recent scripts)     Worsening mental decline, acutely x weeks, on baseline memory impairment  - Medication adjustments per neurology  - My partner and neurology did not feel patient demonstrated capacity to make medical decisions. Son involved and at bedside  - CM following for disposition.  Pending SNF. Would benefit from memory care facility.  - Oriented to self, [facility] and year today     Essential tremor - improved on primidone     Hypokalemia-resolved  -- Replaced  -- Monitor labs periodically      CKDIIIB-improved  - Cr 0.86  - Check periodically.     Esophageal dysphagia   - appears chronic complaint   --consider EGD here if persistent complaint vs OP     Severe malnutrition  -- Nutrition following    Patient is medically ready for discharge to [facility] and her family will transport.  Discharge follow-up  appointments and recommendations are listed below and in the AVS.    Discharge Follow Up Recommendations for outpatient labs/diagnostics:  -Follow-up with facility provider in 1 to 2 days  -Follow-up with your family doctor in 1 week   -Follow-up with [facility] neurology as needed  -Start taking Aricept 10 mg tablet every night  -Start taking primidone 25 mg every 12 hours  -Start taking Seroquel 50 mg every night  -Start taking melatonin 5 mg every night for sleep  -Start taking Nephro-Scarlet, thiamine and vitamin D daily  -Take MiraLAX 1 packet every morning for bowels  -Take Irma-Colace 2 tablets at night for bowels    Day of Discharge     HPI:   Patient sitting up in bed eating breakfast and very pleasant.  Denies any complaints.  Family to transfer patient to her facility today.    Vital Signs:   Temp:  [97.2 °F (36.2 °C)-98.2 °F (36.8 °C)] 98 °F (36.7 °C)  Heart Rate:  [56-77] 62  Resp:  [16] 16  BP: (100-118)/(52-70) 118/70    Physical Exam:  Constitutional: Alert, frail elderly chronically ill-appearing female sitting up in bed eating breakfast in NAD.  ENT: Pink, moist mucous membranes   Respiratory: Nonlabored, symmetrical chest expansion, CTAB, RA  Cardiovascular: RRR, no M/R/G  Gastrointestinal: Soft, NT, ND +BS  Musculoskeletal: RIVERA; no LE edema bilaterally  Neurologic: Oriented x 3, strength symmetric in all extremities, follows all commands,speech clear  Skin: No rashes on exposed skin  Psychiatric: Pleasant and cooperative; normal affect    Pertinent  and/or Most Recent Results     LAB RESULTS:      Lab 10/11/24  1100   WBC 5.62   HEMOGLOBIN 12.7   HEMATOCRIT 39.2   PLATELETS 243   MCV 87.3         Lab 10/11/24  1100 10/10/24  1156   SODIUM 136 136   POTASSIUM 4.4 4.5   CHLORIDE 103 103   CO2 24.0 22.0   ANION GAP 9.0 11.0   BUN 14 14   CREATININE 0.86 0.86   EGFR 66.7 66.7   GLUCOSE 80 102*   CALCIUM 8.8 9.1   MAGNESIUM  --  2.1                         Brief Urine Lab Results  (Last result in the past 365  days)        Color   Clarity   Blood   Leuk Est   Nitrite   Protein   CREAT   Urine HCG        10/06/24 2044 Yellow   Clear   Negative   Negative   Negative   30 mg/dL (1+)                 Microbiology Results (last 10 days)       Procedure Component Value - Date/Time    Blood Culture - Blood, Arm, Right [[id number]]  (Normal) Collected: 10/06/24 1943    Lab Status: Final result Specimen: Blood from Arm, Right Updated: 10/11/24 2000     Blood Culture No growth at 5 days    Blood Culture - Blood, Arm, Left [[id number]]  (Normal) Collected: 10/1940    Lab Status: Final result Specimen: Blood from Arm, Left Updated: 10/11/24 2000     Blood Culture No growth at 5 days            XR Chest 1 View    Result Date: 10/6/2024  XR CHEST 1 VW Date of Exam: 10/6/2024 9:30 PM EDT Indication: Fever, weakness, hypoxia Comparison: 9/28/2024. Findings: There are no airspace consolidations. No pleural fluid. No pneumothorax. The pulmonary vasculature appears within normal limits. The cardiac and mediastinal silhouette appear unremarkable. No acute osseous abnormality identified.     Impression: No acute cardiopulmonary process. Electronically Signed: Siria Mariee MD  10/6/2024 10:50 PM EDT  Workstation ID: [id number]    CT Head Without Contrast    Result Date: 10/6/2024  CT HEAD WO CONTRAST Date of Exam: 10/6/2024 9:00 PM EDT Indication: ams. Comparison: 9/20/2024. Technique: Axial CT images were obtained of the head without contrast administration.  Automated exposure control and iterative construction methods were used. Findings: There is no evidence of hemorrhage. There is no mass effect or midline shift. There is no extracerebral collection. Ventricles are normal in size and configuration for patient's stated age.  Posterior fossa is within normal limits. Calvarium and skull base appear intact.   Visualized sinuses show no air fluid levels. Visualized orbits are unremarkable.     Impression: No acute intracranial process.  Electronically Signed: Siria Mariee MD  10/6/2024 9:18 PM EDT  Workstation ID: [id number]                 Plan for Follow-up of Pending Labs/Results:     Discharge Details        Discharge Medications        New Medications        Instructions Start Date   b complex-vitamin c-folic acid 0.8 MG tablet tablet   1 tablet, Oral, Daily   Start Date: October 15, 2024     donepezil 10 MG tablet  Commonly known as: ARICEPT   10 mg, Oral, Nightly      melatonin 5 MG tablet tablet   5 mg, Oral, Nightly      polyethylene glycol 17 g packet  Commonly known as: MIRALAX   17 g, Oral, Daily   Start Date: October 15, 2024     primidone 50 MG tablet  Commonly known as: MYSOLINE   25 mg, Oral, Every 12 Hours Scheduled      QUEtiapine 50 MG tablet  Commonly known as: SEROquel   50 mg, Oral, Nightly      sennosides-docusate 8.6-50 MG per tablet  Commonly known as: PERICOLACE   2 tablets, Oral, Nightly      thiamine 100 MG tablet  Commonly known as: VITAMIN B1   100 mg, Oral, Daily   Start Date: October 15, 2024     vitamin D3 125 MCG (5000 UT) capsule capsule   5,000 Units, Oral, Daily   Start Date: October 15, 2024            Continue These Medications        Instructions Start Date   aspirin 81 MG EC tablet   81 mg, Oral, Daily      atorvastatin 20 MG tablet  Commonly known as: LIPITOR   20 mg, Oral, Daily      buPROPion  MG 24 hr tablet  Commonly known as: WELLBUTRIN XL   150 mg, Oral, Daily      PARoxetine 40 MG tablet  Commonly known as: PAXIL   40 mg, Oral, Every Morning               No Known Allergies      Discharge Disposition:  Short Term Hospital (DC - External)    Diet:  Hospital:  Diet Order   Procedures    Diet: Regular/House; Texture: Mechanical Ground (NDD 2); Fluid Consistency: Thin (IDDSI 0)       Diet Instructions       Diet: Regular/House Diet; Mechanical Ground (NDD 2); Thin (IDDSI 0)      Discharge Diet: Regular/House Diet    Texture: Mechanical Ground (NDD 2)    Fluid Consistency: Thin (IDDSI 0)              Activity:  Activity Instructions       Activity as Tolerated              Restrictions or Other Recommendations:  None       CODE STATUS:    Code Status and Medical Interventions: CPR (Attempt to Resuscitate); Full Support   Ordered at: 10/07/24 0138     Level Of Support Discussed With:    Health Care Surrogate     Code Status (Patient has no pulse and is not breathing):    CPR (Attempt to Resuscitate)     Medical Interventions (Patient has pulse or is breathing):    Full Support     Additional Instructions for the Follow-ups that You Need to Schedule       Discharge Follow-up with PCP   As directed       Currently Documented PCP:    Frandy Kuhn MD    PCP Phone Number:    [phone]     Follow Up Details: 1 week; please schedule appointment prior to patient's discharge        Discharge Follow-up with Specified Provider: Follow-up with [facility] neurology as needed   As directed      To: Follow-up with [facility] neurology as needed        Discharge Follow-up with Specified Provider: Follow-up with facility provider in 1 to 2 days   As directed      To: Follow-up with facility provider in 1 to 2 days                PETER Clark  10/14/24      Time Spent on Discharge:  I spent  40  minutes on this discharge activity which included: face-to-face encounter with the patient, reviewing the data in the system, coordination of the care with the nursing staff as well as consultants, documentation, and entering orders.

## 2024-10-14 NOTE — PLAN OF CARE
Problem: Adult Inpatient Plan of Care  Goal: Plan of Care Review  Outcome: Progressing  Goal: Patient-Specific Goal (Individualized)  Outcome: Progressing  Goal: Absence of Hospital-Acquired Illness or Injury  Outcome: Progressing  Intervention: Identify and Manage Fall Risk  Recent Flowsheet Documentation  Taken 10/14/2024 0000 by Maria Isabel Arce RNA  Safety Promotion/Fall Prevention:   assistive device/personal items within reach   clutter free environment maintained   safety round/check completed  Taken 10/13/2024 2200 by Maria Isabel Arce RNA  Safety Promotion/Fall Prevention: room organization consistent  Intervention: Prevent Skin Injury  Recent Flowsheet Documentation  Taken 10/14/2024 0000 by Maria Isabel Arce RNA  Body Position: position changed independently  Skin Protection:   incontinence pads utilized   transparent dressing maintained  Taken 10/13/2024 2200 by Maria Isabel Arce RNA  Body Position: position changed independently  Taken 10/13/2024 2000 by Maria Isabel Arce RNA  Skin Protection:   protective footwear used   transparent dressing maintained   drying agents applied   incontinence pads utilized  Intervention: Prevent Infection  Recent Flowsheet Documentation  Taken 10/14/2024 0000 by Maria Isabel Arce RNA  Infection Prevention: environmental surveillance performed  Taken 10/13/2024 2200 by Maria Isabel Arce RNA  Infection Prevention:   environmental surveillance performed   rest/sleep promoted  Goal: Optimal Comfort and Wellbeing  Outcome: Progressing  Intervention: Provide Person-Centered Care  Recent Flowsheet Documentation  Taken 10/14/2024 0000 by Maria Isabel Arce RNA  Trust Relationship/Rapport: care explained  Taken 10/13/2024 2000 by Maria Isabel Arce RNA  Trust Relationship/Rapport:   care explained   choices provided   emotional support provided   empathic listening provided   questions answered   questions encouraged   reassurance provided   thoughts/feelings acknowledged  Goal: Readiness for Transition of Care  Outcome: Progressing  Goal:  Plan of Care Review  Outcome: Progressing  Goal: Patient-Specific Goal (Individualized)  Outcome: Progressing  Goal: Absence of Hospital-Acquired Illness or Injury  Outcome: Progressing  Intervention: Identify and Manage Fall Risk  Recent Flowsheet Documentation  Taken 10/14/2024 0000 by Maria Isabel Arce RNA  Safety Promotion/Fall Prevention:   assistive device/personal items within reach   clutter free environment maintained   safety round/check completed  Taken 10/13/2024 2200 by Maria Isabel Arce RNA  Safety Promotion/Fall Prevention: room organization consistent  Intervention: Prevent Skin Injury  Recent Flowsheet Documentation  Taken 10/14/2024 0000 by Maria Isabel Arce RNA  Body Position: position changed independently  Skin Protection:   incontinence pads utilized   transparent dressing maintained  Taken 10/13/2024 2200 by Maria Isabel Arce RNA  Body Position: position changed independently  Taken 10/13/2024 2000 by Maria Isabel Arce RNA  Skin Protection:   protective footwear used   transparent dressing maintained   drying agents applied   incontinence pads utilized  Intervention: Prevent Infection  Recent Flowsheet Documentation  Taken 10/14/2024 0000 by Maria Isabel Arce RNA  Infection Prevention: environmental surveillance performed  Taken 10/13/2024 2200 by Maria Isabel Arce RNA  Infection Prevention:   environmental surveillance performed   rest/sleep promoted  Goal: Optimal Comfort and Wellbeing  Outcome: Progressing  Intervention: Provide Person-Centered Care  Recent Flowsheet Documentation  Taken 10/14/2024 0000 by Maria Isabel Arce RNA  Trust Relationship/Rapport: care explained  Taken 10/13/2024 2000 by Maria Isabel Arce RNA  Trust Relationship/Rapport:   care explained   choices provided   emotional support provided   empathic listening provided   questions answered   questions encouraged   reassurance provided   thoughts/feelings acknowledged  Goal: Readiness for Transition of Care  Outcome: Progressing     Problem: Fall Injury Risk  Goal: Absence of Fall  and Fall-Related Injury  Outcome: Progressing  Intervention: Identify and Manage Contributors  Recent Flowsheet Documentation  Taken 10/14/2024 0000 by Maria Isabel Arce RNA  Medication Review/Management: medications reviewed  Taken 10/13/2024 2200 by Maria Isabel Arce RNA  Medication Review/Management: medications reviewed  Taken 10/13/2024 2000 by Maria Isabel Arce RNA  Medication Review/Management: medications reviewed  Intervention: Promote Injury-Free Environment  Recent Flowsheet Documentation  Taken 10/14/2024 0000 by Maria Isabel Arce RNA  Safety Promotion/Fall Prevention:   assistive device/personal items within reach   clutter free environment maintained   safety round/check completed  Taken 10/13/2024 2200 by Maria Isabel Arce RNA  Safety Promotion/Fall Prevention: room organization consistent     Problem: UTI (Urinary Tract Infection)  Goal: Improved Infection Symptoms  Outcome: Progressing  Intervention: Facilitate Optimal Urinary Elimination  Recent Flowsheet Documentation  Taken 10/13/2024 2000 by Maria Isabel Arce RNA  Urinary Elimination Promotion:   absorbent pad/diaper use encouraged   toileting offered     Problem: Confusion Acute  Goal: Optimal Cognitive Function  Outcome: Progressing  Intervention: Minimize Contributing Factors  Recent Flowsheet Documentation  Taken 10/13/2024 2000 by Maria Isabel Arce RNA  Environment Familiarity/Consistency: daily routine followed     Problem: Skin Injury Risk Increased  Goal: Skin Health and Integrity  Outcome: Progressing  Intervention: Optimize Skin Protection  Recent Flowsheet Documentation  Taken 10/14/2024 0000 by Maria Isabel Arce RNA  Activity Management: activity encouraged  Pressure Reduction Techniques:   frequent weight shift encouraged   weight shift assistance provided  Head of Bed (HOB) Positioning: HOB at 30-45 degrees  Pressure Reduction Devices:   elbow protectors utilized   heel offloading device utilized  Skin Protection:   incontinence pads utilized   transparent dressing maintained  Taken  10/13/2024 2200 by Maira Isabel Arce RNA  Activity Management: activity encouraged  Head of Bed (HOB) Positioning: HOB at 30-45 degrees  Taken 10/13/2024 2000 by Maria Isabel Arce RNA  Skin Protection:   protective footwear used   transparent dressing maintained   drying agents applied   incontinence pads utilized   Goal Outcome Evaluation: